# Patient Record
Sex: FEMALE | Race: WHITE | Employment: FULL TIME | URBAN - METROPOLITAN AREA
[De-identification: names, ages, dates, MRNs, and addresses within clinical notes are randomized per-mention and may not be internally consistent; named-entity substitution may affect disease eponyms.]

---

## 2017-01-01 DIAGNOSIS — I48.19 PERSISTENT ATRIAL FIBRILLATION (HCC): ICD-10-CM

## 2017-02-15 RX ORDER — ALPRAZOLAM 0.5 MG/1
TABLET ORAL
Qty: 30 TAB | Refills: 0 | OUTPATIENT
Start: 2017-02-15 | End: 2017-04-13 | Stop reason: SDUPTHER

## 2017-02-15 NOTE — TELEPHONE ENCOUNTER
Patient requesting refill. States she only usually takes the med 3-4 times a month but is under stress due to her  being in hospice. Pt is also back under cardio care due to going back into A. Fib.  Please call [phone] or [address] from 3-6pm

## 2017-04-04 ENCOUNTER — OFFICE VISIT (OUTPATIENT)
Dept: CARDIOLOGY CLINIC | Age: 77
End: 2017-04-04

## 2017-04-04 VITALS
HEIGHT: 65 IN | HEART RATE: 64 BPM | SYSTOLIC BLOOD PRESSURE: 122 MMHG | WEIGHT: 131.2 LBS | DIASTOLIC BLOOD PRESSURE: 80 MMHG | BODY MASS INDEX: 21.86 KG/M2

## 2017-04-04 DIAGNOSIS — I10 HYPERTENSION, ESSENTIAL, BENIGN: ICD-10-CM

## 2017-04-04 DIAGNOSIS — E78.2 MIXED HYPERLIPIDEMIA: Chronic | ICD-10-CM

## 2017-04-04 DIAGNOSIS — I48.19 PERSISTENT ATRIAL FIBRILLATION (HCC): Primary | ICD-10-CM

## 2017-04-04 DIAGNOSIS — I49.3 PVC (PREMATURE VENTRICULAR CONTRACTION): ICD-10-CM

## 2017-04-04 DIAGNOSIS — Z91.89 STROKE RISK: ICD-10-CM

## 2017-04-04 NOTE — PROGRESS NOTES
Loulou Humphreys     1940       Carson Isabel MD, [facility]  Date of Visit-2017   PCP is Zakiya Best MD   [facility] [place]  [facility]  HPI:  Loulou Humphreys is a 77 y.o. female   Persistent atrial fibrillation  Echo  with dilated RA but preserved LV fx and some mild moderate reugurg  Her  passed away after a long illness this past few months. She's had stress related diarrhea. Her  of 54 years  17. Her son Ha Lugo from [place], [place], is here with her today and very helpful to her. She notices some palpitations in the p.m. She has no chest pain, shortness of breath, dizziness or fainting. She seems overall at her baseline and blood pressure is well controlled. Assessment/Plan:       ICD-10-CM      1. Persistent atrial fibrillation, tolerating well, normal EF. Seems to be NYHA1. Continue Diltiazem with Toprol. Heart rate is good. 2. True stroke risk. Continue [address]. Has had no bleeding. 3. Hypertension, at goal.  4. PVCs, quiet at this time. 5. History of DVT, previous possible embolic ; the RA dilated, but that may also be due to the atrial fibrillation/HTN and aging . At this point no changes are needed as there are no CHF symptoms. 6. Mixed dyslipidemia, on statin. 7. Follow up in six months. Clinically doing well. Expressed our condolences over the loss of her  and understand she is doing very well with her son as good support. Cardiac History:   MAGALI 13=EF 55-60, mild MR  CV 13-sucessful with 150 J one shock  ECHO 13=mild dilated RV, 2+ ZOË, 2+ MR,TR, mild pulm HTN  ECHO 3-= EF 65% ZOË,mild MR,TR  CATH 2010= normal cors, ef 60%     ROS-except as noted above. . A complete cardiac and respiratory are reviewed and negative except as above ; Resp-denies wheezing  or productive cough,.  Const- No unusual weight loss or fever; Neuro-no recent seizure or CVA ; GI- No BRBPR, abdom pain, bloating ; - no  hematuria   see supplement sheet, initialed and to be scanned by staff  Past Medical History:   Diagnosis Date    Breast cancer ([facility] 3/18/2009    XRT;      [facility] 3/18/2009    Left Leg; ? 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 She has never used smokeless tobacco. She reports that she drinks about 1.0 oz of alcohol per week  She reports that she does not use illicit drugs. Exam and Labs:    Visit Vitals    /80 (BP 1 Location: Left arm, BP Patient Position: Sitting)    Pulse 64    Ht 5' 5\" (1.651 m)    Wt 131 lb 3.2 oz (59.5 kg)    BMI 21.83 kg/m2      Constitutional:  NAD, comfortable  Head: NC,AT. Eyes: No scleral icterus. Neck:  Neck supple. No JVD present. Throat: moist mucous membranes. Chest: Effort normal & normal respiratory excursion . Neurological: alert, conversant and oriented . Skin: Skin is not cold. No obvious systemic rash noted. Not diaphoretic. No erythema. Psychiatric:  Grossly normal mood and affect. Behavior appears normal. Extremities:  no clubbing or cyanosis. Abdomen: non distended    Lungs:breath sounds normal. No stridor. distress, wheezes or  Rales. Heart:    no murmurs noted, no gallops noted, irregularly irregular rhythm with rate ok, no JVD , PMI non displaced. Edema: Edema is none. Lab Results   Component Value Date/Time    Cholesterol, total 148 07/18/2016 10:20 AM    HDL Cholesterol 68 07/18/2016 10:20 AM    LDL, calculated 63 07/18/2016 10:20 AM    Triglyceride 83 07/18/2016 10:20 AM     No results found for this or any previous visit.    Lab Results   Component Value Date/Time    Sodium 143 07/18/2016 10:20 AM    Potassium 4.7 07/18/2016 10:20 AM    Chloride 102 07/18/2016 10:20 AM    CO2 24 07/18/2016 10:20 AM    Anion gap 9 08/24/2013 03:55 AM    Glucose 84 07/18/2016 10:20 AM    BUN 13 07/18/2016 10:20 AM    Creatinine 0.68 07/18/2016 10:20 AM    BUN/Creatinine ratio 19 07/18/2016 10:20 AM    GFR est  07/18/2016 10:20 AM    GFR est non-AA 86 07/18/2016 10:20 AM    Calcium 9.4 07/18/2016 10:20 AM      Wt Readings from Last 3 Encounters:   04/04/17 131 lb 3.2 oz (59.5 kg)   10/04/16 130 lb (59 kg)   09/06/16 136 lb 9.6 oz (62 kg)      BP Readings from Last 3 Encounters:   04/04/17 122/80   10/04/16 130/82   09/06/16 108/64        Current Outpatient Prescriptions   Medication Sig    ACETAMINOPHEN/DIPHENHYDRAMINE (TYLENOL PM PO) Take  by mouth as needed.  ALPRAZolam (XANAX) 0.5 mg tablet TAKE 1 TABLET BY MOUTH NIGHTLY AS NEEDED FOR ANXIETY.  ELIQUIS 2.5 mg tablet TAKE 1 TABLET BY MOUTH 2 TIMES A DAY.  atorvastatin (LIPITOR) 10 mg tablet TAKE 1/2 TABLET BY MOUTH EVERY DAY.  dilTIAZem CD (CARDIZEM CD) 240 mg ER capsule TAKE 1 CAPSULE BY MOUTH EVERY DAY.  cholecalciferol (VITAMIN D3) 400 unit tab tablet Take 800 Units by mouth daily.  triamcinolone acetonide (KENALOG) 0.1 % ointment Apply  to affected area two (2) times a day. use thin layer (Patient taking differently: Apply  to affected area as needed. use thin layer)    metoprolol succinate (TOPROL-XL) 50 mg XL tablet TAKE 1 TABLET BY MOUTH DAILY.  cetirizine (ZYRTEC) 10 mg tablet Take 10 mg by mouth as needed.  B COMPLEX WITH VITAMIN C (ALLBEE/C PO) Take 1 Tab by mouth daily.  naproxen sodium (ALEVE) 220 mg cap Take  by mouth daily as needed. No current facility-administered medications for this visit. Impression see above.

## 2017-04-04 NOTE — MR AVS SNAPSHOT
Visit Information Date & Time Provider Department Dept. Phone Encounter #  
 4/4/2017  9:20 AM Reynodl Haile MD [facility] [phone] [id number] Upcoming Health Maintenance Date Due Pneumococcal 65+ High/Highest Risk (2 of 2 - PPSV23) 10/18/2011 INFLUENZA AGE 9 TO ADULT 8/1/2016 GLAUCOMA SCREENING Q2Y 3/27/2017 MEDICARE YEARLY EXAM 7/19/2017 COLONOSCOPY 8/20/2019 DTaP/Tdap/Td series (2 - Td) 1/18/2022 Allergies as of 4/4/2017  Review Complete On: 10/4/2016 By: Reynold Haile MD  
  
 Severity Noted Reaction Type Reactions Ciprofloxacin  10/01/2006    Hives Current Immunizations  Reviewed on 1/5/2015 Name Date Influenza Vaccine 10/15/2015, 1/5/2015 Influenza Vaccine Split 10/20/2011 12:54 PM  
 Pneumococcal Vaccine (Unspecified Type) 10/18/2006 TDAP Vaccine 1/18/2012 Zoster 7/1/2007 Not reviewed this visit Vitals BP Pulse Height(growth percentile) Weight(growth percentile) BMI OB Status 122/80 (BP 1 Location: Left arm, BP Patient Position: Sitting) 64 5' 5\" (1.651 m) 131 lb 3.2 oz (59.5 kg) 21.83 kg/m2 Postmenopausal  
 Smoking Status Never Smoker Vitals History BMI and BSA Data Body Mass Index Body Surface Area  
 21.83 kg/m 2 1.65 m 2 Preferred Pharmacy Pharmacy Name Phone [address], [address] [phone] Your Updated Medication List  
  
   
This list is accurate as of: 4/4/17 10:45 AM.  Always use your most recent med list.  
  
  
  
  
 ALEVE 220 mg Cap Generic drug:  naproxen sodium Take  by mouth daily as needed. ALLBEE/C PO Take 1 Tab by mouth daily. ALPRAZolam 0.5 mg tablet Commonly known as:  XANAX  
TAKE 1 TABLET BY MOUTH NIGHTLY AS NEEDED FOR ANXIETY. atorvastatin 10 mg tablet Commonly known as:  LIPITOR  
TAKE 1/2 TABLET BY MOUTH EVERY DAY. cholecalciferol 400 unit Tab tablet Commonly known as:  VITAMIN D3 Take 800 Units by mouth daily. dilTIAZem  mg ER capsule Commonly known as:  CARDIZEM CD  
TAKE 1 CAPSULE BY MOUTH EVERY DAY. ELIQUIS 2.5 mg tablet Generic drug:  apixaban TAKE 1 TABLET BY MOUTH 2 TIMES A DAY. metoprolol succinate 50 mg XL tablet Commonly known as:  TOPROL-XL  
TAKE 1 TABLET BY MOUTH DAILY. triamcinolone acetonide 0.1 % ointment Commonly known as:  KENALOG Apply  to affected area two (2) times a day. use thin layer TYLENOL PM PO Take  by mouth as needed. ZyrTEC 10 mg tablet Generic drug:  cetirizine Take 10 mg by mouth as needed. Patient Instructions Follow up with Dr. Khushbu Tilley in 6 months. Introducing [facility]! Hernandez Lares introduces [employer] patient portal. Now you can access parts of your medical record, email your doctor's office, and request medication refills online. 1. In your internet browser, go to [email or web address] 2. Click on the First Time User? Click Here link in the Sign In box. You will see the New Member Sign Up page. 3. Enter your [employer] Access Code exactly as it appears below. You will not need to use this code after youve completed the sign-up process. If you do not sign up before the expiration date, you must request a new code. · [employer] Access Code: [email or web address] Expires: 7/3/2017 10:45 AM 
 
4. Enter the last four digits of your Social Security Number (xxxx) and Date of Birth (mm/dd/yyyy) as indicated and click Submit. You will be taken to the next sign-up page. 5. Create a Horizontal Systemst ID. This will be your [employer] login ID and cannot be changed, so think of one that is secure and easy to remember. 6. Create a Horizontal System[employer] password. You can change your password at any time. 7. Enter your Password Reset Question and Answer. This can be used at a later time if you forget your password. 8. Enter your e-mail address. You will receive e-mail notification when new information is available in [address] 9. Click Sign Up. You can now view and download portions of your medical record. 10. Click the Download Summary menu link to download a portable copy of your medical information. If you have questions, please visit the Frequently Asked Questions section of the TriggerMail website. Remember, Tr[employer] is NOT to be used for urgent needs. For medical emergencies, dial 911. Now available from your iPhone and Android! Please provide this summary of care documentation to your next provider. Your primary care clinician is listed as [address]ie [address]. If you have any questions after today's visit, please call [phone].

## 2017-04-13 ENCOUNTER — OFFICE VISIT (OUTPATIENT)
Dept: INTERNAL MEDICINE CLINIC | Age: 77
End: 2017-04-13

## 2017-04-13 ENCOUNTER — HOSPITAL ENCOUNTER (OUTPATIENT)
Dept: LAB | Age: 77
Discharge: HOME | End: 2017-04-13
Payer: MEDICARE

## 2017-04-13 VITALS
DIASTOLIC BLOOD PRESSURE: 86 MMHG | WEIGHT: 131 LBS | RESPIRATION RATE: 16 BRPM | BODY MASS INDEX: 21.83 KG/M2 | SYSTOLIC BLOOD PRESSURE: 152 MMHG | TEMPERATURE: 97.6 F | OXYGEN SATURATION: 98 % | HEIGHT: 65 IN | HEART RATE: 103 BPM

## 2017-04-13 DIAGNOSIS — R19.7 DIARRHEA, UNSPECIFIED TYPE: ICD-10-CM

## 2017-04-13 DIAGNOSIS — M79.89 LEG SWELLING: ICD-10-CM

## 2017-04-13 DIAGNOSIS — G47.9 SLEEP DISORDER: ICD-10-CM

## 2017-04-13 DIAGNOSIS — I48.19 PERSISTENT ATRIAL FIBRILLATION (HCC): Primary | ICD-10-CM

## 2017-04-13 DIAGNOSIS — I10 HYPERTENSION, ESSENTIAL, BENIGN: ICD-10-CM

## 2017-04-13 PROCEDURE — 80053 COMPREHEN METABOLIC PANEL: CPT

## 2017-04-13 PROCEDURE — 84443 ASSAY THYROID STIM HORMONE: CPT

## 2017-04-13 PROCEDURE — 85027 COMPLETE CBC AUTOMATED: CPT

## 2017-04-13 PROCEDURE — 36415 COLL VENOUS BLD VENIPUNCTURE: CPT

## 2017-04-13 RX ORDER — ALPRAZOLAM 0.5 MG/1
TABLET ORAL
Qty: 30 TAB | Refills: 0 | Status: SHIPPED | OUTPATIENT
Start: 2017-04-13 | End: 2018-06-04

## 2017-04-13 NOTE — PROGRESS NOTES
HISTORY OF PRESENT ILLNESS  Loulou Humphreys is a 77 y.o. female. HPI     Reports she is still in a-fib and can feel this. Pt is followed by Dr. Neville Tarango.     Hypertension ROS: taking medications as instructed, no medication side effects noted, no TIA's, no chest pain on exertion, no dyspnea on exertion, no swelling of ankles. New concerns: Stable- bp was 152/86 in the office today. Pt complains of swelling in bilateral legs and feet that have been present for the past week. Pt saw Dr. Neville Tarango on 4/4/17. Pt watches her salt intake. Pt saw podiatrist who told her that her feet were hot and swollen. Pt went home and elevated her feet and used ice packs. Pt states that swelling is worse at night and better in the morning. She has had intermittent diarrhea for the past month that she thinks is stress driven. Her  passed away 4 weeks ago. Pt states that she has been wearing a depend due to diarrhea. Pt has occasionally had diarrhea in the past with stress. Pt states that initially she was having diarrhea every time she ate. She started to eat a more bland diet and has since been adding other foods. Pt ate a salad last night for dinner and had diarrhea. Reports diarrhea has improved in the past two days. Pt denies abdominal pain and bloody or darker colored stools. Reports she had some of her colon removed in the past because of cancer and had diarrhea for two months- she picked up infection while in hospital that was causing her diarrhea. Her  passed away 4 weeks ago. Reports her  had dementia and it was very tough. They were  54 years and she is missing him. She has a daughter and a son that both live out [facility]. Pt has considered moving out [facility] in a few years when everything calms down. Pt does after school in [address] [place] [address] and the kids keep her busy. Pt states that she has been waking up every night at 2 am and this has been going on for awhile.  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 She has been taking Xanax 0.5 mg 1/2 tablet that helps her sleep. She tries not to take Xanax daily. Reports when she does not take this medication she is up from 2 am to 6 am.       Review of Systems   Cardiovascular: Positive for leg swelling. Gastrointestinal: Positive for diarrhea. All other systems reviewed and are negative. Physical Exam   Constitutional: She is oriented to person, place, and time. She appears well-developed and well-nourished. HENT:   Head: Normocephalic and atraumatic. Right Ear: External ear normal.   Left Ear: External ear normal.   Nose: Nose normal.   Mouth/Throat: Oropharynx is clear and moist.   Eyes: Conjunctivae and EOM are normal.   Neck: Normal range of motion. Neck supple. Carotid bruit is not present. No thyroid mass and no thyromegaly present. Cardiovascular: S1 normal, S2 normal, normal heart sounds and intact distal pulses. A-fib- chronic   Pulmonary/Chest: Effort normal and breath sounds normal.   Abdominal: Soft. Normal appearance and bowel sounds are normal. There is no hepatosplenomegaly. There is no tenderness. Musculoskeletal: Normal range of motion. Neurological: She is alert and oriented to person, place, and time. She has normal strength. No cranial nerve deficit or sensory deficit. Coordination normal.   Skin: Skin is warm, dry and intact. No abrasion and no rash noted. Psychiatric: She has a normal mood and affect. Her behavior is normal. Judgment and thought content normal.   Nursing note and vitals reviewed. ASSESSMENT and PLAN  Aleks Costa was seen today for swelling and sleep problem. Diagnoses and all orders for this visit:    Persistent atrial fibrillation (HCC)  Stable- Continue current medications.   -     CBC W/O DIFF    Hypertension, essential, benign  BP is at goal. I do not recommend any change in medications.  -     METABOLIC PANEL, COMPREHENSIVE    Leg swelling  Leg swelling has been present for the past week and this correlates to the temperature change. Advised pt that veins have a hard time adjusting to temperature change. Her swelling is better in the morning and worse in evening. She saw Dr. Chiquita Ordonez a week ago who thought her heart was fine and she had echo in 10/2016. Pt should wear compression stockings. I am avoiding diuretic as she has diarrhea. Diarrhea, unspecified type  Diarrhea has improved in past two days and we will monitor this and she should let me know if it does not improve.  -     TSH 3RD GENERATION    Sleep disorder  I told pt that I do not feel comfortable with her taking Ambien and she should continue to take Xanax 1/2 tablet as she has been doing this. Other orders  -     ALPRAZolam (XANAX) 0.5 mg tablet; TAKE 1 TABLET BY MOUTH NIGHTLY AS NEEDED FOR ANXIETY. Pt should follow up with Dr. Whitney Waters in one month.    lab results and schedule of future lab studies reviewed with patient  reviewed diet, exercise and weight control  reviewed medications and side effects in detail     Written by Ayleen Goncalves, as dictated by Prema Acuna MD.     Current diagnosis and concerns discussed with pt at length. Understands risks and benefits or current treatment plan and medications and accepts the treatment and medication with any possible risks. Pt asks appropriate questions which were answered. Pt instructed to call with any concerns or problems.

## 2017-04-14 LAB
ALBUMIN SERPL-MCNC: 4.5 G/DL (ref 3.5–4.8)
ALBUMIN/GLOB SERPL: 2 {RATIO} (ref 1.2–2.2)
ALP SERPL-CCNC: 91 IU/L (ref 39–117)
ALT SERPL-CCNC: 31 IU/L (ref 0–32)
AST SERPL-CCNC: 31 IU/L (ref 0–40)
BILIRUB SERPL-MCNC: 0.6 MG/DL (ref 0–1.2)
BUN SERPL-MCNC: 11 MG/DL (ref 8–27)
BUN/CREAT SERPL: 17 (ref 12–28)
CALCIUM SERPL-MCNC: 9.3 MG/DL (ref 8.7–10.3)
CHLORIDE SERPL-SCNC: 102 MMOL/L (ref 96–106)
CO2 SERPL-SCNC: 24 MMOL/L (ref 18–29)
CREAT SERPL-MCNC: 0.64 MG/DL (ref 0.57–1)
ERYTHROCYTE [DISTWIDTH] IN BLOOD BY AUTOMATED COUNT: 14.3 % (ref 12.3–15.4)
GLOBULIN SER CALC-MCNC: 2.3 G/DL (ref 1.5–4.5)
GLUCOSE SERPL-MCNC: 79 MG/DL (ref 65–99)
HCT VFR BLD AUTO: 39 % (ref 34–46.6)
HGB BLD-MCNC: 12.5 G/DL (ref 11.1–15.9)
MCH RBC QN AUTO: 30.1 PG (ref 26.6–33)
MCHC RBC AUTO-ENTMCNC: 32.1 G/DL (ref 31.5–35.7)
MCV RBC AUTO: 94 FL (ref 79–97)
PLATELET # BLD AUTO: 218 X10E3/UL (ref 150–379)
POTASSIUM SERPL-SCNC: 4.1 MMOL/L (ref 3.5–5.2)
PROT SERPL-MCNC: 6.8 G/DL (ref 6–8.5)
RBC # BLD AUTO: 4.15 X10E6/UL (ref 3.77–5.28)
SODIUM SERPL-SCNC: 143 MMOL/L (ref 134–144)
TSH SERPL DL<=0.005 MIU/L-ACNC: 2.67 UIU/ML (ref 0.45–4.5)
WBC # BLD AUTO: 7.5 X10E3/UL (ref 3.4–10.8)

## 2017-05-15 ENCOUNTER — OFFICE VISIT (OUTPATIENT)
Dept: INTERNAL MEDICINE CLINIC | Age: 77
End: 2017-05-15

## 2017-05-15 VITALS
TEMPERATURE: 97.4 F | OXYGEN SATURATION: 98 % | SYSTOLIC BLOOD PRESSURE: 143 MMHG | HEART RATE: 96 BPM | WEIGHT: 125.4 LBS | HEIGHT: 65 IN | DIASTOLIC BLOOD PRESSURE: 73 MMHG | BODY MASS INDEX: 20.89 KG/M2 | RESPIRATION RATE: 18 BRPM

## 2017-05-15 DIAGNOSIS — F43.9 STRESS AT HOME: ICD-10-CM

## 2017-05-15 DIAGNOSIS — R53.82 CHRONIC FATIGUE: ICD-10-CM

## 2017-05-15 DIAGNOSIS — I48.19 PERSISTENT ATRIAL FIBRILLATION (HCC): ICD-10-CM

## 2017-05-15 DIAGNOSIS — L30.9 ECZEMA, UNSPECIFIED TYPE: ICD-10-CM

## 2017-05-15 DIAGNOSIS — K59.1 FUNCTIONAL DIARRHEA: Primary | ICD-10-CM

## 2017-05-15 RX ORDER — MONTELUKAST SODIUM 4 MG/1
1 TABLET, CHEWABLE ORAL 2 TIMES DAILY
Qty: 60 TAB | Refills: 3 | Status: SHIPPED | OUTPATIENT
Start: 2017-05-15 | End: 2018-06-04

## 2017-05-15 RX ORDER — CLOBETASOL PROPIONATE 0.5 MG/G
CREAM TOPICAL 2 TIMES DAILY
Qty: 15 G | Refills: 0 | Status: SHIPPED | OUTPATIENT
Start: 2017-05-15 | End: 2018-06-04

## 2017-05-15 NOTE — MR AVS SNAPSHOT
Visit Information Date & Time Provider Department Dept. Phone Encounter #  
 5/15/2017 10:15 AM Carlos Escobedo MD [facility] [address] [id number] Your Appointments 10/3/2017 10:20 AM  
ESTABLISHED PATIENT with Luisa Dorado MD  
[facility] ([facility] SCHEDULING) Appt Note: 6 month follow up  
 [address]  
[address] [address] Upcoming Health Maintenance Date Due Pneumococcal 65+ High/Highest Risk (2 of 2 - PPSV23) 10/18/2011 GLAUCOMA SCREENING Q2Y 3/27/2017 MEDICARE YEARLY EXAM 7/19/2017 INFLUENZA AGE 9 TO ADULT 8/1/2017 COLONOSCOPY 8/20/2019 DTaP/Tdap/Td series (2 - Td) 1/18/2022 Allergies as of 5/15/2017  Review Complete On: 5/15/2017 By: Anupama Mccartney LPN Severity Noted Reaction Type Reactions Ciprofloxacin  10/01/2006    Hives Current Immunizations  Reviewed on 1/5/2015 Name Date Influenza Vaccine 10/15/2015, 1/5/2015 Influenza Vaccine Split 10/20/2011 12:54 PM  
 Pneumococcal Vaccine (Unspecified Type) 10/18/2006 TDAP Vaccine 1/18/2012 Zoster 7/1/2007 Not reviewed this visit You Were Diagnosed With   
  
 Codes Comments Functional diarrhea    -  Primary ICD-10-CM: K59.1 ICD-9-CM: 564.5 Eczema, unspecified type     ICD-10-CM: L30.9 ICD-9-CM: 692.9 Chronic fatigue     ICD-10-CM: R53.82 
ICD-9-CM: 780.79 Stress at home     ICD-10-CM: F43.9 ICD-9-CM: V61.9 Persistent atrial fibrillation (HCC)     ICD-10-CM: I48.1 ICD-9-CM: 427.31 Vitals BP Pulse Temp Resp Height(growth percentile) Weight(growth percentile) 143/73 (BP 1 Location: Left arm, BP Patient Position: Sitting) 96 97.4 °F (36.3 °C) (Oral) 18 5' 5\" (1.651 m) 125 lb 6.4 oz (56.9 kg) SpO2 BMI OB Status Smoking Status 98% 20.87 kg/m2 Postmenopausal Never Smoker Vitals History BMI and BSA Data Body Mass Index Body Surface Area  
 20.87 kg/m 2 1.62 m 2 Preferred Pharmacy Pharmacy Name Phone [address], [address] [facility] [phone] Your Updated Medication List  
  
   
This list is accurate as of: 5/15/17 11:05 AM.  Always use your most recent med list.  
  
  
  
  
 ALEVE 220 mg Cap Generic drug:  naproxen sodium Take  by mouth daily as needed. ALLBEE/C PO Take 1 Tab by mouth daily. ALPRAZolam 0.5 mg tablet Commonly known as:  XANAX  
TAKE 1 TABLET BY MOUTH NIGHTLY AS NEEDED FOR ANXIETY. atorvastatin 10 mg tablet Commonly known as:  LIPITOR  
TAKE 1/2 TABLET BY MOUTH EVERY DAY. cholecalciferol 400 unit Tab tablet Commonly known as:  VITAMIN D3 Take 800 Units by mouth daily. clobetasol 0.05 % topical cream  
Commonly known as:  Celia Rape Apply  to affected area two (2) times a day. colestipol 1 gram tablet Commonly known as:  COLESTID Take 1 Tab by mouth two (2) times a day. dilTIAZem  mg ER capsule Commonly known as:  CARDIZEM CD  
TAKE 1 CAPSULE BY MOUTH EVERY DAY. ELIQUIS 2.5 mg tablet Generic drug:  apixaban TAKE 1 TABLET BY MOUTH 2 TIMES A DAY. metoprolol succinate 50 mg XL tablet Commonly known as:  TOPROL-XL  
TAKE 1 TABLET BY MOUTH DAILY. triamcinolone acetonide 0.1 % ointment Commonly known as:  KENALOG Apply  to affected area two (2) times a day. use thin layer TYLENOL PM PO Take  by mouth as needed. ZyrTEC 10 mg tablet Generic drug:  cetirizine Take 10 mg by mouth as needed. Prescriptions Sent to Pharmacy Refills  
 clobetasol (TEMOVATE) 0.05 % topical cream 0 Sig: Apply  to affected area two (2) times a day. Class: Normal  
 Pharmacy: [facility] [address], [address] [facility] Ph #: [phone]  Route: Topical  
 colestipol (COLESTID) 1 gram tablet 3 Sig: Take 1 Tab by mouth two (2) times a day. Class: Normal  
 Pharmacy: [facility] [address] [facility] #: [phone] Route: Oral  
  
We Performed the Following C DIFFICILE TOXIN A & B BY EIA [07864 CPT(R)] CULTURE, STOOL [id number] CPT(R)] OVA & PARASITES, STOOL [id number] CPT(R)] WBC, STOOL [23193 CPT(R)] Introducing [facility]! Huong Pooja introduces [employer] patient portal. Now you can access parts of your medical record, email your doctor's office, and request medication refills online. 1. In your internet browser, go to [email or web address] 2. Click on the First Time User? Click Here link in the Sign In box. You will see the New Member Sign Up page. 3. Enter your Zkatter Access Code exactly as it appears below. You will not need to use this code after youve completed the sign-up process. If you do not sign up before the expiration date, you must request a new code. · [employer] Access Code: [email or web address] Expires: 7/3/2017 10:45 AM 
 
4. Enter the last four digits of your Social Security Number (xxxx) and Date of Birth (mm/dd/yyyy) as indicated and click Submit. You will be taken to the next sign-up page. 5. Create a Zkatter ID. This will be your Zkatter login ID and cannot be changed, so think of one that is secure and easy to remember. 6. Create a Zkatter password. You can change your password at any time. 7. Enter your Password Reset Question and Answer. This can be used at a later time if you forget your password. 8. Enter your e-mail address. You will receive e-mail notification when new information is available in [address] 9. Click Sign Up. You can now view and download portions of your medical record. 10. Click the Download Summary menu link to download a portable copy of your medical information. If you have questions, please visit the Frequently Asked Questions section of the K[employer] website. Remember, EveryScape is NOT to be used for urgent needs. For medical emergencies, dial 911. Now available from your iPhone and Android! Please provide this summary of care documentation to your next provider. Your primary care clinician is listed as Bal Martin. If you have any questions after today's visit, please call [phone].

## 2017-05-16 NOTE — PROGRESS NOTES
HISTORY OF PRESENT ILLNESS  Jeff Humphreys is a 77 y.o. female. HPI  She has had diarrhea for 2 mo and thinks it is from stress with loss of . Did have a rectal polyp in 2009-no recent colonoscopy. No blood in stool or weight loss. Cbc ad cmp normal 1 mo ago. Newest med is eliquis. Using xanax about 2 per week and declines other meds for stress. No uti sxs. Review of Systems   Constitutional: Positive for malaise/fatigue. Negative for fever and weight loss. Respiratory: Negative for shortness of breath. Cardiovascular: Negative for chest pain. Gastrointestinal: Positive for diarrhea. Negative for abdominal pain, blood in stool, melena, nausea and vomiting. Genitourinary: Negative for dysuria. Psychiatric/Behavioral: Positive for depression. The patient has insomnia. Physical Exam   Constitutional: She is oriented to person, place, and time. She appears well-developed and well-nourished. HENT:   Head: Normocephalic and atraumatic. Neck: Normal range of motion. Neck supple. Carotid bruit is not present. No thyromegaly present. Cardiovascular: Normal rate, regular rhythm, S1 normal, S2 normal, normal heart sounds and intact distal pulses. No murmur heard. Pulmonary/Chest: Effort normal and breath sounds normal. No respiratory distress. She has no wheezes. She has no rales. Abdominal: Soft. She exhibits no distension. There is no tenderness. There is no rebound. Musculoskeletal: She exhibits no edema. Neurological: She is alert and oriented to person, place, and time. Skin: No rash noted. Psychiatric: She has a normal mood and affect. Her behavior is normal.   Nursing note and vitals reviewed. ASSESSMENT and PLAN  Lauren Mulligan was seen today for gi problem and diarrhea. Diagnoses and all orders for this visit:    Functional diarrhea  -     colestipol (COLESTID) 1 gram tablet; Take 1 Tab by mouth two (2) times a day.   -     OVA & PARASITES, STOOL  -     CULTURE, STOOL  - C DIFFICILE TOXIN A & B BY EIA  -     WBC, STOOL    Eczema, unspecified type  -     clobetasol (TEMOVATE) 0.05 % topical cream; Apply  to affected area two (2) times a day.     Chronic fatigue    Stress at home    Persistent atrial fibrillation [facility])    refer to gi for colonoscopy if studies normal and no help with colestid

## 2017-05-18 DIAGNOSIS — K59.1 FUNCTIONAL DIARRHEA: Primary | ICD-10-CM

## 2017-05-22 ENCOUNTER — HOSPITAL ENCOUNTER (EMERGENCY)
Age: 77
Discharge: HOME | End: 2017-05-23
Attending: EMERGENCY MEDICINE | Admitting: EMERGENCY MEDICINE
Payer: MEDICARE

## 2017-05-22 DIAGNOSIS — N93.8 DUB (DYSFUNCTIONAL UTERINE BLEEDING): Primary | ICD-10-CM

## 2017-05-22 LAB
ALBUMIN SERPL BCP-MCNC: 4.2 G/DL (ref 3.5–5)
ALBUMIN/GLOB SERPL: 1.1 {RATIO} (ref 1.1–2.2)
ALP SERPL-CCNC: 97 U/L (ref 45–117)
ALT SERPL-CCNC: 29 U/L (ref 12–78)
ANION GAP BLD CALC-SCNC: 8 MMOL/L (ref 5–15)
AST SERPL W P-5'-P-CCNC: 20 U/L (ref 15–37)
BASOPHILS # BLD AUTO: 0 K/UL (ref 0–0.1)
BASOPHILS # BLD: 0 % (ref 0–1)
BILIRUB SERPL-MCNC: 0.3 MG/DL (ref 0.2–1)
BUN SERPL-MCNC: 17 MG/DL (ref 6–20)
BUN/CREAT SERPL: 15 (ref 12–20)
CALCIUM SERPL-MCNC: 9.5 MG/DL (ref 8.5–10.1)
CHLORIDE SERPL-SCNC: 105 MMOL/L (ref 97–108)
CO2 SERPL-SCNC: 26 MMOL/L (ref 21–32)
CREAT SERPL-MCNC: 1.11 MG/DL (ref 0.55–1.02)
EOSINOPHIL # BLD: 0.1 K/UL (ref 0–0.4)
EOSINOPHIL NFR BLD: 1 % (ref 0–7)
ERYTHROCYTE [DISTWIDTH] IN BLOOD BY AUTOMATED COUNT: 13.2 % (ref 11.5–14.5)
GLOBULIN SER CALC-MCNC: 3.8 G/DL (ref 2–4)
GLUCOSE SERPL-MCNC: 88 MG/DL (ref 65–100)
HCT VFR BLD AUTO: 40.5 % (ref 35–47)
HEMOCCULT STL QL: POSITIVE
HGB BLD-MCNC: 13.2 G/DL (ref 11.5–16)
LYMPHOCYTES # BLD AUTO: 29 % (ref 12–49)
LYMPHOCYTES # BLD: 3.2 K/UL (ref 0.8–3.5)
MCH RBC QN AUTO: 30.1 PG (ref 26–34)
MCHC RBC AUTO-ENTMCNC: 32.6 G/DL (ref 30–36.5)
MCV RBC AUTO: 92.5 FL (ref 80–99)
MONOCYTES # BLD: 1.2 K/UL (ref 0–1)
MONOCYTES NFR BLD AUTO: 11 % (ref 5–13)
NEUTS SEG # BLD: 6.5 K/UL (ref 1.8–8)
NEUTS SEG NFR BLD AUTO: 59 % (ref 32–75)
PLATELET # BLD AUTO: 238 K/UL (ref 150–400)
POTASSIUM SERPL-SCNC: 3.6 MMOL/L (ref 3.5–5.1)
PROT SERPL-MCNC: 8 G/DL (ref 6.4–8.2)
RBC # BLD AUTO: 4.38 M/UL (ref 3.8–5.2)
SODIUM SERPL-SCNC: 139 MMOL/L (ref 136–145)
WBC # BLD AUTO: 11 K/UL (ref 3.6–11)

## 2017-05-22 PROCEDURE — 99284 EMERGENCY DEPT VISIT MOD MDM: CPT

## 2017-05-22 PROCEDURE — 81001 URINALYSIS AUTO W/SCOPE: CPT | Performed by: EMERGENCY MEDICINE

## 2017-05-22 PROCEDURE — 36415 COLL VENOUS BLD VENIPUNCTURE: CPT | Performed by: EMERGENCY MEDICINE

## 2017-05-22 PROCEDURE — 80053 COMPREHEN METABOLIC PANEL: CPT | Performed by: EMERGENCY MEDICINE

## 2017-05-22 PROCEDURE — 51701 INSERT BLADDER CATHETER: CPT

## 2017-05-22 PROCEDURE — 85025 COMPLETE CBC W/AUTO DIFF WBC: CPT | Performed by: EMERGENCY MEDICINE

## 2017-05-22 PROCEDURE — 82272 OCCULT BLD FECES 1-3 TESTS: CPT | Performed by: EMERGENCY MEDICINE

## 2017-05-23 ENCOUNTER — HOSPITAL ENCOUNTER (OUTPATIENT)
Dept: CT IMAGING | Age: 77
Discharge: HOME | End: 2017-05-23
Attending: INTERNAL MEDICINE
Payer: MEDICARE

## 2017-05-23 ENCOUNTER — HOSPITAL ENCOUNTER (OUTPATIENT)
Dept: LAB | Age: 77
Discharge: HOME | End: 2017-05-23
Payer: MEDICARE

## 2017-05-23 ENCOUNTER — OFFICE VISIT (OUTPATIENT)
Dept: INTERNAL MEDICINE CLINIC | Age: 77
End: 2017-05-23

## 2017-05-23 VITALS
HEART RATE: 88 BPM | BODY MASS INDEX: 21.33 KG/M2 | HEIGHT: 65 IN | SYSTOLIC BLOOD PRESSURE: 119 MMHG | RESPIRATION RATE: 16 BRPM | OXYGEN SATURATION: 98 % | DIASTOLIC BLOOD PRESSURE: 69 MMHG | TEMPERATURE: 97.7 F | WEIGHT: 128 LBS

## 2017-05-23 VITALS
HEART RATE: 70 BPM | SYSTOLIC BLOOD PRESSURE: 131 MMHG | TEMPERATURE: 98.5 F | RESPIRATION RATE: 18 BRPM | OXYGEN SATURATION: 98 % | DIASTOLIC BLOOD PRESSURE: 85 MMHG

## 2017-05-23 DIAGNOSIS — N95.0 POST-MENOPAUSAL BLEEDING: Primary | ICD-10-CM

## 2017-05-23 DIAGNOSIS — N76.0 ACUTE VAGINITIS: ICD-10-CM

## 2017-05-23 DIAGNOSIS — C18.9 MALIGNANT NEOPLASM OF COLON, UNSPECIFIED PART OF COLON (HCC): ICD-10-CM

## 2017-05-23 DIAGNOSIS — K92.1 BLOOD IN STOOL: ICD-10-CM

## 2017-05-23 LAB
APPEARANCE UR: CLEAR
ARTERIAL PATENCY WRIST A: YES
BACTERIA URNS QL MICRO: NEGATIVE /HPF
BASE DEFICIT BLD-SCNC: 2 MMOL/L
BDY SITE: NORMAL
BILIRUB UR QL: NEGATIVE
C DIFF TOX A+B STL QL IA: NEGATIVE
CAMPYLOBACTER STL CULT: NORMAL
COLOR UR: NORMAL
E COLI SXT STL QL IA: NORMAL
EPITH CASTS URNS QL MICRO: NORMAL /LPF
GAS FLOW.O2 O2 DELIVERY SYS: NORMAL L/MIN
GLUCOSE UR STRIP.AUTO-MCNC: NEGATIVE MG/DL
HCO3 BLD-SCNC: 23.1 MMOL/L (ref 22–26)
HGB UR QL STRIP: NEGATIVE
HYALINE CASTS URNS QL MICRO: NORMAL /LPF (ref 0–5)
KETONES UR QL STRIP.AUTO: NEGATIVE MG/DL
LEUKOCYTE ESTERASE UR QL STRIP.AUTO: NEGATIVE
NITRITE UR QL STRIP.AUTO: NEGATIVE
O+P SPEC MICRO: NORMAL
PCO2 BLD: 36.8 MMHG (ref 35–45)
PH BLD: 7.41 [PH] (ref 7.35–7.45)
PH UR STRIP: 5 [PH] (ref 5–8)
PO2 BLD: 88 MMHG (ref 80–100)
PROT UR STRIP-MCNC: NEGATIVE MG/DL
RBC #/AREA URNS HPF: NORMAL /HPF (ref 0–5)
SALM + SHIG STL CULT: NORMAL
SAO2 % BLD: 97 % (ref 92–97)
SP GR UR REFRACTOMETRY: 1.02 (ref 1–1.03)
SPECIMEN TYPE: NORMAL
UROBILINOGEN UR QL STRIP.AUTO: 0.2 EU/DL (ref 0.2–1)
WBC STL QL MICRO: NORMAL
WBC URNS QL MICRO: NORMAL /HPF (ref 0–4)

## 2017-05-23 PROCEDURE — 74177 CT ABD & PELVIS W/CONTRAST: CPT

## 2017-05-23 PROCEDURE — 82803 BLOOD GASES ANY COMBINATION: CPT

## 2017-05-23 PROCEDURE — 74011636320 HC RX REV CODE- 636/320: Performed by: RADIOLOGY

## 2017-05-23 PROCEDURE — 36600 WITHDRAWAL OF ARTERIAL BLOOD: CPT

## 2017-05-23 RX ADMIN — IOPAMIDOL 100 ML: 755 INJECTION, SOLUTION INTRAVENOUS at 16:21

## 2017-05-23 NOTE — PROGRESS NOTES
Patient was seen at [facility] ER. She states that she's passed blood clots and dark blood. Says it was vaginal. Denies pain.

## 2017-05-23 NOTE — PROGRESS NOTES
HISTORY OF PRESENT ILLNESS  Lizette Humphreys is a 77 y.o. female. [facility] was having significant diarrhea. Her stool cultures were negative. The diarrhea has resolved, however she yesterday had an episode of vaginal bleeding spontaneous. No fevers or chills, no itch. Minimal abdominal cramping. In the ER her stool was found to be occult positive. No cultures were taken. She denies fevers or chills. She denies recent sexual activity. 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 Last colonoscopy that I have was 2012. She is on Eliquis. She's not been aware of blood in her stool. Her HGB and chemistries were stable in the ER. Review of Systems   Constitutional: Negative for chills, fever and weight loss. Episode of vaginal bleeding   Gastrointestinal: Negative for abdominal pain, blood in stool, diarrhea, nausea and vomiting. Genitourinary: Negative for dysuria, frequency, hematuria and urgency. Physical Exam   Constitutional: She is oriented to person, place, and time. She appears well-developed and well-nourished. HENT:   Head: Normocephalic and atraumatic. Neck: Normal range of motion. Neck supple. Carotid bruit is not present. No thyromegaly present. Cardiovascular: Normal rate, S1 normal, S2 normal, normal heart sounds and intact distal pulses. An irregularly irregular rhythm present. No murmur heard. Pulmonary/Chest: Effort normal and breath sounds normal. No respiratory distress. She has no wheezes. She has no rales. Abdominal: Soft. Bowel sounds are normal. She exhibits no distension. There is no tenderness. Genitourinary:   Genitourinary Comments: No external vaginal lesions seen  Vault well visualized and small amount of dark discharge seen in vault swab taken  No obvious bleeding around rectum   Musculoskeletal: She exhibits no edema. Neurological: She is alert and oriented to person, place, and time. Psychiatric: She has a normal mood and affect. Her behavior is normal.   Nursing note and vitals reviewed. ASSESSMENT and PLAN  Jessie Burdick was seen today for hospital follow up. Diagnoses and all orders for this visit:    Post-menopausal bleeding  -     REFERRAL TO GYNECOLOGY  Vaginal swab sent for infection  Blood in stool-on occult testing?  Contaminate but given prior rectal adenoca will send to gi and get ct to be sure no obvious metastatic disease  Cont eliquis for now as benefit for cva prevention strong but if gross bleeding ( not occult) may need to consider stopping the eliquis    Malignant neoplasm of colon, unspecified part of colon ([facility] [address]  -     REFERRAL TO GASTROENTEROLOGY  -     CT ABD PELV WO CONT; Future

## 2017-05-23 NOTE — ED TRIAGE NOTES
TRIAGE NOTE: Patient arrived via EMS from home with c/o \"fluid leaking from the vagina. \" Patient was at a therapy and when she went to stand up she had brown drainage coming down her leg. Patient has a history of chronic diarrhea. Patient is alert and oriented.

## 2017-05-23 NOTE — DISCHARGE INSTRUCTIONS
Abnormal Uterine Bleeding: Care Instructions  Your Care Instructions  Abnormal uterine bleeding (AUB) is irregular bleeding from the uterus that is longer or heavier than usual or does not occur at your regular time. Sometimes it is caused by changes in hormone levels. It can also be caused by growths in the uterus, such as fibroids or polyps. Sometimes a cause cannot be found. You may have heavy bleeding when you are not expecting your period. Your doctor may suggest a pregnancy test, if you think you are pregnant. Follow-up care is a key part of your treatment and safety. Be sure to make and go to all appointments, and call your doctor if you are having problems. It's also a good idea to know your test results and keep a list of the medicines you take. How can you care for yourself at home? · Be safe with medicines. Take pain medicines exactly as directed. ¨ If the doctor gave you a prescription medicine for pain, take it as prescribed. ¨ If you are not taking a prescription pain medicine, ask your doctor if you can take an over-the-counter medicine. · You may be low in iron because of blood loss. Eat a balanced diet that is high in iron and vitamin C. Foods rich in iron include red meat, shellfish, eggs, beans, and leafy green vegetables. Talk to your doctor about whether you need to take iron pills or a multivitamin. When should you call for help? Call 911 anytime you think you may need emergency care. For example, call if:  · You passed out (lost consciousness). Call your doctor now or seek immediate medical care if:  · You have sudden, severe pain in your belly or pelvis. · You have severe vaginal bleeding. You are soaking through your usual pads or tampons every hour for 2 or more hours. · You feel dizzy or lightheaded, or you feel like you may faint. Watch closely for changes in your health, and be sure to contact your doctor if:  · You have new belly or pelvic pain.   · You have a fever.  · Your bleeding gets worse or lasts longer than 1 week. · You think you may be pregnant. Where can you learn more? Go to http://alfred-lester.info/. Enter M096 in the search box to learn more about \"Abnormal Uterine Bleeding: Care Instructions. \"  Current as of: October 13, 2016  Content Version: 11.2  © 2065-6077 [employer]. Care instructions adapted under license by [employer] (which disclaims liability or warranty for this information). If you have questions about a medical condition or this instruction, always ask your healthcare professional. [place] [address] any warranty or liability for your use of this information. We hope that we have addressed all of your medical concerns. The examination and treatment you received in the Emergency Department were for an emergent problem and were not intended as complete care. It is important that you follow up with your healthcare provider(s) for ongoing care. If your symptoms worsen or do not improve as expected, and you are unable to reach your usual health care provider(s), you should return to the Emergency Department. Today's healthcare is undergoing tremendous change, and patient satisfaction surveys are one of the many tools to assess the quality of medical care. You may receive a survey from the [employer] regarding your experience in the Emergency Department. I hope that your experience has been completely positive, particularly the medical care that I provided. As such, please participate in the survey; anything less than excellent does not meet my expectations or intentions. [address] [facility] and [address] [place] [address] participate in nationally recognized quality of care measures.   If your blood pressure is greater than 120/80, as reported below, we urge that you seek medical care to address the potential of high blood pressure, commonly known as hypertension. Hypertension can be hereditary or can be caused by certain medical conditions, pain, stress, or \"white coat syndrome. \"       Please make an appointment with your health care provider(s) for follow up of your Emergency Department visit. VITALS:   Patient Vitals for the past 8 hrs:   Temp Pulse Resp BP SpO2   05/23/17 0011 98.5 °F (36.9 °C) 70 18 131/85 98 %   05/22/17 2123 97.9 °F (36.6 °C) 85 16 145/76 97 %          Thank you for allowing us to provide you with medical care today. We realize that you have many choices for your emergency care needs. Please choose us in the future for any continued health care needs. Paulinopierre Mayo, [address] [place] [address].   Office: [phone]            Recent Results (from the past 24 hour(s))   CBC WITH AUTOMATED DIFF    Collection Time: 05/22/17  9:31 PM   Result Value Ref Range    WBC 11.0 3.6 - 11.0 K/uL    RBC 4.38 3.80 - 5.20 M/uL    HGB 13.2 11.5 - 16.0 g/dL    HCT 40.5 35.0 - 47.0 %    MCV 92.5 80.0 - 99.0 FL    MCH 30.1 26.0 - 34.0 PG    MCHC 32.6 30.0 - 36.5 g/dL    RDW 13.2 11.5 - 14.5 %    PLATELET 335 878 - 997 K/uL    NEUTROPHILS 59 32 - 75 %    LYMPHOCYTES 29 12 - 49 %    MONOCYTES 11 5 - 13 %    EOSINOPHILS 1 0 - 7 %    BASOPHILS 0 0 - 1 %    ABS. NEUTROPHILS 6.5 1.8 - 8.0 K/UL    ABS. LYMPHOCYTES 3.2 0.8 - 3.5 K/UL    ABS. MONOCYTES 1.2 (H) 0.0 - 1.0 K/UL    ABS. EOSINOPHILS 0.1 0.0 - 0.4 K/UL    ABS.  BASOPHILS 0.0 0.0 - 0.1 K/UL   METABOLIC PANEL, COMPREHENSIVE    Collection Time: 05/22/17  9:31 PM   Result Value Ref Range    Sodium 139 136 - 145 mmol/L    Potassium 3.6 3.5 - 5.1 mmol/L    Chloride 105 97 - 108 mmol/L    CO2 26 21 - 32 mmol/L    Anion gap 8 5 - 15 mmol/L    Glucose 88 65 - 100 mg/dL    BUN 17 6 - 20 MG/DL    Creatinine 1.11 (H) 0.55 - 1.02 MG/DL    BUN/Creatinine ratio 15 12 - 20      GFR est AA 58 (L) >60 ml/min/1.73m2    GFR est non-AA 48 (L) >60 ml/min/1.73m2 Calcium 9.5 8.5 - 10.1 MG/DL    Bilirubin, total 0.3 0.2 - 1.0 MG/DL    ALT (SGPT) 29 12 - 78 U/L    AST (SGOT) 20 15 - 37 U/L    Alk.  phosphatase 97 45 - 117 U/L    Protein, total 8.0 6.4 - 8.2 g/dL    Albumin 4.2 3.5 - 5.0 g/dL    Globulin 3.8 2.0 - 4.0 g/dL    A-G Ratio 1.1 1.1 - 2.2     URINALYSIS W/MICROSCOPIC    Collection Time: 05/22/17 11:19 PM   Result Value Ref Range    Color YELLOW/STRAW      Appearance CLEAR CLEAR      Specific gravity 1.024 1.003 - 1.030      pH (UA) 5.0 5.0 - 8.0      Protein NEGATIVE  NEG mg/dL    Glucose NEGATIVE  NEG mg/dL    Ketone NEGATIVE  NEG mg/dL    Bilirubin NEGATIVE  NEG      Blood NEGATIVE  NEG      Urobilinogen 0.2 0.2 - 1.0 EU/dL    Nitrites NEGATIVE  NEG      Leukocyte Esterase NEGATIVE  NEG      WBC 0-4 0 - 4 /hpf    RBC 0-5 0 - 5 /hpf    Epithelial cells FEW FEW /lpf    Bacteria NEGATIVE  NEG /hpf    Hyaline cast 5-10 0 - 5 /lpf   OCCULT BLOOD, STOOL    Collection Time: 05/22/17 11:19 PM   Result Value Ref Range    Occult blood, stool POSITIVE (A) NEG     POC G3 - PUL    Collection Time: 05/23/17 12:05 AM   Result Value Ref Range    pH (POC) 7.407 7.35 - 7.45      pCO2 (POC) 36.8 35 - 45 MMHG    pO2 (POC) 88 80 - 100 MMHG    HCO3 (POC) 23.1 22 - 26 MMOL/L    sO2 (POC) 97 92 - 97 %    Base deficit (POC) 2 mmol/L    Site LEFT BRACHIAL      Device: ROOM AIR      Allens test (POC) YES      Specimen type (POC) ARTERIAL

## 2017-05-23 NOTE — ED NOTES
Pt given discharge instructions. Verbalizes understanding. VSS.   Left ambulatory with all belongings to [address] [place] [address] to await ride

## 2017-05-23 NOTE — ED PROVIDER NOTES
HPI Comments: 77 y.o. female with past medical history significant for lung nodule, HTN, AFib, breast cancer, DVT, colon cancer, and hyperlipidemia who presents from home via EMS with chief complaint of vaginal discharge. Pt reports that she was at a grief workshop earlier tonight when she got up and felt water coming out of her vagina. Pt went out to the parking lot and the feeling persisted. Pt went home and found that her clothes were covered in a brown discharge that is odorless. Pt reports that she has had chronic diarrhea for the last 5 to 6 weeks. Pt has seen her PCP for this problem and has given 5 stool samples. Pt does not know the result of these studies. Pt reports that her diarrhea had stopped and this discharge does not seem to be diarrhea. Pt has been on Eliquis since 6/16. Pt reports prior colon surgery before 2010. Pt denies other bleeding and lightheadedness. There are no other acute medical concerns at this time. Social hx: nonsmoker, EtOH use  PCP: Jamil Verdin MD    Note written by Denise Lane, as dictated by Clint Arroyo MD 12:02 AM      The history is provided by the patient. No  was used. Past Medical History:   Diagnosis Date    Breast cancer ([address] 3/18/2009    XRT;      Colon cancer ([address]     DVT of leg (deep venous thrombosis) ([facility] [address] 3/18/2009    Left Leg; ? Tamoxifen     Essential hypertension     Lung nodule 10/1/2009    Mixed hyperlipidemia 3/18/2009    Paroxysmal a-fib (HCC)     Rectal polyp 3/18/2009    -adenocarcinoma Stage 1        Past Surgical History:   Procedure Laterality Date    CARDIOVERSION EXTERNAL  9/18/2013         ENDOSCOPY, COLON, DIAGNOSTIC      8/09 neg         Family History:   Problem Relation Age of Onset    Stroke Father     Stroke Brother        Social History     Social History    Marital status:      Spouse name: N/A    Number of children: N/A    Years of education: N/A     Occupational History    Not on file. Social History Main Topics    Smoking status: Never Smoker    Smokeless tobacco: Never Used    Alcohol use 1.0 oz/week     2 Glasses of wine per week      Comment: occasional    Drug use: No    Sexual activity: Not on file     Other Topics Concern    Not on file     Social History Narrative         ALLERGIES: Ciprofloxacin    Review of Systems   Constitutional: Negative for chills and fever. HENT: Negative for congestion, nosebleeds and rhinorrhea. Eyes: Negative for pain and redness. Respiratory: Negative for cough and shortness of breath. Cardiovascular: Negative for chest pain and palpitations. Gastrointestinal: Negative for abdominal pain, nausea and vomiting. Genitourinary: Positive for vaginal discharge. Negative for dysuria, frequency, vaginal bleeding and vaginal pain. Musculoskeletal: Negative for myalgias. Skin: Negative for rash and wound. Neurological: Negative for seizures, syncope and weakness. Hematological: Does not bruise/bleed easily. Psychiatric/Behavioral: Negative for agitation, confusion, dysphoric mood and suicidal ideas. The patient is not nervous/anxious. All other systems reviewed and are negative. Vitals:    05/22/17 2123   BP: 145/76   Pulse: 85   Resp: 16   Temp: 97.9 °F (36.6 °C)   SpO2: 97%            Physical Exam   Constitutional: She is oriented to person, place, and time. She appears well-developed and well-nourished. HENT:   Head: Normocephalic and atraumatic. Eyes: EOM are normal. Pupils are equal, round, and reactive to light. Neck: Normal range of motion. Neck supple. No tracheal deviation present. Cardiovascular: Normal rate, regular rhythm, normal heart sounds and intact distal pulses. Pulmonary/Chest: Effort normal and breath sounds normal. No stridor. No respiratory distress. She has no wheezes. She has no rales. She exhibits no tenderness. Abdominal: Soft.  Bowel sounds are normal. She exhibits no distension. There is no tenderness. There is no rebound. Musculoskeletal: Normal range of motion. She exhibits no edema or tenderness. Neurological: She is alert and oriented to person, place, and time. No cranial nerve deficit. Skin: Skin is warm and dry. No rash noted. No pallor. Psychiatric: She has a normal mood and affect. Nursing note and vitals reviewed. Note written by Denise Holcomb, as dictated by Bernard Jose MD 12:03 AM      MDM  Number of Diagnoses or Management Options  DUB (dysfunctional uterine bleeding):   Diagnosis management comments: 78-year-old female with past medical history significant for hypertension, A. fib, breast cancer, colon cancer, high cholesterol presents with complaints of copious brown vaginal discharge. Patient reports she initially thought it was urine. Patient on Eliquis. Denies lightheadedness, syncope. Patient is well-appearing, hemodynamically stable, no acute distress, no respiratory distress. Plan-CBC/CMP/UA, sample of discharge sent for occult blood testing. labs unremarkable         Amount and/or Complexity of Data Reviewed  Clinical lab tests: ordered and reviewed    Risk of Complications, Morbidity, and/or Mortality  Presenting problems: low  Diagnostic procedures: low  Management options: low    Patient Progress  Patient progress: stable    ED Course       Procedures        Patient's results have been reviewed with them. Patient and/or family have verbally conveyed their understanding and agreement of the patient's signs, symptoms, diagnosis, treatment and prognosis and additionally agree to follow up as recommended or return to the Emergency Room should their condition change prior to follow-up.   Discharge instructions have also been provided to the patient with some educational information regarding their diagnosis as well a list of reasons why they would want to return to the ER prior to their follow-up appointment should their condition change.

## 2017-05-23 NOTE — MR AVS SNAPSHOT
Visit Information Date & Time Provider Department Dept. Phone Encounter #  
 5/23/2017  9:15 AM Iker Anderson MD [facility] [address] [id number] Your Appointments 5/24/2017  1:40 PM  
New Patient with MD Chato Mcrae Joshua ([facility]) Appt Note: NPP c/o PMB, seen in ER 5/21/17/M/appt made by Dr. [facility] office 6 [facility] [address] [place] [phone]  
  
   
 [facility] [address]  
  
    
 10/3/2017 10:20 AM  
ESTABLISHED PATIENT with Reese Powers MD  
[facility] ([facility] SCHEDULING) Appt Note: 6 month follow up  
 [address]  
[address] [facility] 7 [phone] Upcoming Health Maintenance Date Due Pneumococcal 65+ High/Highest Risk (2 of 2 - PPSV23) 10/18/2011 GLAUCOMA SCREENING Q2Y 3/27/2017 MEDICARE YEARLY EXAM 7/19/2017 INFLUENZA AGE 9 TO ADULT 8/1/2017 COLONOSCOPY 8/20/2019 DTaP/Tdap/Td series (2 - Td) 1/18/2022 Allergies as of 5/23/2017  Review Complete On: 5/23/2017 By: Rojelio Caruso LPN Severity Noted Reaction Type Reactions Ciprofloxacin  10/01/2006    Hives Current Immunizations  Reviewed on 1/5/2015 Name Date Influenza Vaccine 10/15/2015, 1/5/2015 Influenza Vaccine Split 10/20/2011 12:54 PM  
 Pneumococcal Vaccine (Unspecified Type) 10/18/2006 TDAP Vaccine 1/18/2012 Zoster 7/1/2007 Not reviewed this visit You Were Diagnosed With   
  
 Codes Comments Post-menopausal bleeding    -  Primary ICD-10-CM: N95.0 ICD-9-CM: 627.1 Blood in stool     ICD-10-CM: K92.1 ICD-9-CM: 578.1 Malignant neoplasm of colon, unspecified part of colon ([facility] [address]     ICD-10-CM: C18.9 ICD-9-CM: 153.9 Vitals BP Pulse Temp Resp Height(growth percentile) Weight(growth percentile)  
 119/69 (BP 1 Location: Left arm, BP Patient Position: Sitting) 88 97.7 °F (36.5 °C) (Oral) 16 5' 5\" (1.651 m) 128 lb (58.1 kg) SpO2 BMI OB Status Smoking Status 98% 21.3 kg/m2 Postmenopausal Never Smoker Vitals History BMI and BSA Data Body Mass Index Body Surface Area  
 21.3 kg/m 2 1.63 m 2 Preferred Pharmacy Pharmacy Name Phone [address], [address] [phone] Your Updated Medication List  
  
   
This list is accurate as of: 5/23/17 10:07 AM.  Always use your most recent med list.  
  
  
  
  
 ALEVE 220 mg Cap Generic drug:  naproxen sodium Take  by mouth daily as needed. ALLBEE/C PO Take 1 Tab by mouth daily. ALPRAZolam 0.5 mg tablet Commonly known as:  XANAX  
TAKE 1 TABLET BY MOUTH NIGHTLY AS NEEDED FOR ANXIETY. atorvastatin 10 mg tablet Commonly known as:  LIPITOR  
TAKE 1/2 TABLET BY MOUTH EVERY DAY. cholecalciferol 400 unit Tab tablet Commonly known as:  VITAMIN D3 Take 800 Units by mouth daily. clobetasol 0.05 % topical cream  
Commonly known as:  De Rias Apply  to affected area two (2) times a day. colestipol 1 gram tablet Commonly known as:  COLESTID Take 1 Tab by mouth two (2) times a day. dilTIAZem  mg ER capsule Commonly known as:  CARDIZEM CD  
TAKE 1 CAPSULE BY MOUTH EVERY DAY. ELIQUIS 2.5 mg tablet Generic drug:  apixaban TAKE 1 TABLET BY MOUTH 2 TIMES A DAY. metoprolol succinate 50 mg XL tablet Commonly known as:  TOPROL-XL  
TAKE 1 TABLET BY MOUTH DAILY. triamcinolone acetonide 0.1 % ointment Commonly known as:  KENALOG Apply  to affected area two (2) times a day. use thin layer TYLENOL PM PO Take  by mouth as needed. ZyrTEC 10 mg tablet Generic drug:  cetirizine Take 10 mg by mouth as needed. We Performed the Following REFERRAL TO GASTROENTEROLOGY [WUV19 Custom] Comments:  
 Please evaluate patient for blood in stool. REFERRAL TO GYNECOLOGY [REF30 Custom] Comments:  
 Please evaluate patient for post-menopausal bleeding. APPT HAS BEEN SCHEDULED FOR TOMORROW 5/24/17 WITH DR Manju Avelar WITH [facility] AT 1:40. PLEASE ARRIVE AT 1:30 FOR CHECK-IN. To-Do List   
 05/23/2017 Imaging:  CT ABD PELV WO CONT   
  
 05/23/2017 2:30 PM  
  Appointment with [facility] 1 at [facility] CT ([phone]) NON-CONTRAST STUDY: 1. The patient should not eat solid food four hours before the appointment but should be encouraged to drink clear liquids. 2. Bring any non [facility] facility films/images pertaining to the area of interest with you on the day of appointment. 3. Check in at registration at least 30 minutes before appt time unless you were instructed to do otherwise. 4. If you have to drink oral contrast please pick it up any weekday prior to your appointment, if you cannot please check in 2 hrs  before appt time. Referral Information Referral ID Referred By Referred To  
  
 [id number] [facility] ALINA FERREIRA [facility], [address] [facility],[address], [place]   
   [address] Phone: [phone] Fax: [phone] Visits Status Start Date End Date 1 New Request 5/23/17 5/23/18 If your referral has a status of pending review or denied, additional information will be sent to support the outcome of this decision. Referral ID Referred By Referred To  
 [id number] [facility] [address] [facility] Trip [phone] [facility], [address] [facility] Visits Status Start Date End Date 1 New Request 5/23/17 5/23/18 If your referral has a status of pending review or denied, additional information will be sent to support the outcome of this decision. Referral ID Referred By Referred To [id number] [facility] Not Available Visits Status Start Date End Date 1 New Request 5/23/17 5/23/18 If your referral has a status of pending review or denied, additional information will be sent to support the outcome of this decision. Introducing [facility]! Julieta Ribera introduces [employer] patient portal. Now you can access parts of your medical record, email your doctor's office, and request medication refills online. 1. In your internet browser, go to [email or web address] 2. Click on the First Time User? Click Here link in the Sign In box. You will see the New Member Sign Up page. 3. Enter your [employer] Access Code exactly as it appears below. You will not need to use this code after youve completed the sign-up process. If you do not sign up before the expiration date, you must request a new code. · [employer] Access Code: [email or web address] Expires: 7/3/2017 10:45 AM 
 
4. Enter the last four digits of your Social Security Number (xxxx) and Date of Birth (mm/dd/yyyy) as indicated and click Submit. You will be taken to the next sign-up page. 5. Create a [employer] ID. This will be your [employer] login ID and cannot be changed, so think of one that is secure and easy to remember. 6. Create a [employer] password. You can change your password at any time. 7. Enter your Password Reset Question and Answer. This can be used at a later time if you forget your password. 8. Enter your e-mail address. You will receive e-mail notification when new information is available in [address] 9. Click Sign Up. You can now view and download portions of your medical record. 10. Click the Download Summary menu link to download a portable copy of your medical information. If you have questions, please visit the Frequently Asked Questions section of the [employer] website. Remember, [employer] is NOT to be used for urgent needs. For medical emergencies, dial 911. Now available from your iPhone and Android! Please provide this summary of care documentation to your next provider. Your primary care clinician is listed as Bal 68. If you have any questions after today's visit, please call [phone].

## 2017-05-23 NOTE — ED NOTES
Assumed care of patient. Brought back from [facility] via [address]. Assisted patient with changing clothes. Pt has 2 briefs saturated with dark liquid. Pt states its coming from her vagina. Does not smell like stool. Provided with warm blanket and call bell. Awaiting eval by PA.  Pt denies any pain

## 2017-05-24 ENCOUNTER — TELEPHONE (OUTPATIENT)
Dept: INTERNAL MEDICINE CLINIC | Age: 77
End: 2017-05-24

## 2017-05-24 ENCOUNTER — OFFICE VISIT (OUTPATIENT)
Dept: OBGYN CLINIC | Age: 77
End: 2017-05-24

## 2017-05-24 ENCOUNTER — PATIENT OUTREACH (OUTPATIENT)
Dept: INTERNAL MEDICINE CLINIC | Age: 77
End: 2017-05-24

## 2017-05-24 DIAGNOSIS — N95.0 POSTMENOPAUSAL BLEEDING: Primary | ICD-10-CM

## 2017-05-24 RX ORDER — TRAMADOL HYDROCHLORIDE 50 MG/1
50 TABLET ORAL
Qty: 10 TAB | Refills: 0 | OUTPATIENT
Start: 2017-05-24 | End: 2018-06-04

## 2017-05-24 NOTE — PROGRESS NOTES
[facility][note 2]    Patient on discharge report dated 5/23/17 from [facility]. Left message on voicemail. Will attempt to contact again. Need to complete post-discharge assessment. Pt noted in for f/u with PCP Dr. Octavio Alonzo on 5/23/17.

## 2017-05-24 NOTE — PROGRESS NOTES
She is seeing dr Haily Granados gyn today-marina make sure they have this report showing enlarged uterus?  thanks

## 2017-05-24 NOTE — MR AVS SNAPSHOT
Visit Information Date & Time Provider Department Dept. Phone Encounter #  
 5/24/2017  1:40 PM Tano Juares, Radha Ring e [phone] [id number] Your Appointments 6/6/2017  9:45 AM  
ULTRASOUND with MARIAMA Arciniega ([address]) Appt Note: u/s gyn( PMB) f/u w/ FW after [address] [place] [address]  
[phone]  
  
   
 [address]  
  
    
 6/6/2017 10:30 AM  
Follow Up with MD Chato Tompkins ([address]) Appt Note: u/s gyn( PMB) f/u w/ FW [address] [place] [phone]  
  
   
 [facility] [address]  
  
    
 10/3/2017 10:20 AM  
ESTABLISHED PATIENT with Pool Vaca MD  
[facility]: 6 month follow up  
 [address]  
[address],[facility] [address] [facility] 7 [phone] Upcoming Health Maintenance Date Due INFLUENZA AGE 9 TO ADULT 8/1/2017 COLONOSCOPY 8/20/2019 Allergies as of 5/24/2017  Review Complete On: 5/23/2017 By: Yanni Larry LPN Severity Noted Reaction Type Reactions Ciprofloxacin  10/01/2006    Hives Current Immunizations  Reviewed on 1/5/2015 Name Date Influenza Vaccine 10/15/2015, 1/5/2015 Influenza Vaccine Split 10/20/2011 12:54 PM  
 Pneumococcal Vaccine (Unspecified Type) 10/18/2006 TDAP Vaccine 1/18/2012 Zoster 7/1/2007 Not reviewed this visit Vitals OB Status Smoking Status Postmenopausal Never Smoker Preferred Pharmacy Pharmacy Name Phone [address], [address] [facility] [phone] Your Updated Medication List  
  
   
 This list is accurate as of: 5/24/17 11:59 PM.  Always use your most recent med list.  
  
  
  
  
 ALEVE 220 mg Cap Generic drug:  naproxen sodium Take  by mouth daily as needed. ALLBEE/C PO Take 1 Tab by mouth daily. ALPRAZolam 0.5 mg tablet Commonly known as:  XANAX  
TAKE 1 TABLET BY MOUTH NIGHTLY AS NEEDED FOR ANXIETY. atorvastatin 10 mg tablet Commonly known as:  LIPITOR  
TAKE 1/2 TABLET BY MOUTH EVERY DAY. cholecalciferol 400 unit Tab tablet Commonly known as:  VITAMIN D3 Take 800 Units by mouth daily. clobetasol 0.05 % topical cream  
Commonly known as:  Drena Regulus Apply  to affected area two (2) times a day. colestipol 1 gram tablet Commonly known as:  COLESTID Take 1 Tab by mouth two (2) times a day. dilTIAZem  mg ER capsule Commonly known as:  CARDIZEM CD  
TAKE 1 CAPSULE BY MOUTH EVERY DAY. ELIQUIS 2.5 mg tablet Generic drug:  apixaban TAKE 1 TABLET BY MOUTH 2 TIMES A DAY. metoprolol succinate 50 mg XL tablet Commonly known as:  TOPROL-XL  
TAKE 1 TABLET BY MOUTH DAILY. traMADol 50 mg tablet Commonly known as:  ULTRAM  
Take 1 Tab by mouth every six (6) hours as needed for Pain. Max Daily Amount: 200 mg.  
  
 triamcinolone acetonide 0.1 % ointment Commonly known as:  KENALOG Apply  to affected area two (2) times a day. use thin layer TYLENOL PM PO Take  by mouth as needed. ZyrTEC 10 mg tablet Generic drug:  cetirizine Take 10 mg by mouth as needed. Please provide this summary of care documentation to your next provider. Your primary care clinician is listed as Bal Martin. If you have any questions after today's visit, please call [phone].

## 2017-05-24 NOTE — TELEPHONE ENCOUNTER
Discussed her ct report-she did see gyn dr Silvana Morillo today and will have pelvic us next week and possibly biopsy as well  Concerned re pain with bleeding-will call in ultram prn  She made an appt at colorectal specialist with dr Loli Mclean  Discussed stopping eliquis if bleeding becomes more significant from vaginal area

## 2017-05-25 ENCOUNTER — TELEPHONE (OUTPATIENT)
Dept: OBGYN CLINIC | Age: 77
End: 2017-05-25

## 2017-05-25 LAB
A VAGINAE DNA VAG QL NAA+PROBE: NORMAL SCORE
BVAB2 DNA VAG QL NAA+PROBE: NORMAL SCORE
C ALBICANS DNA VAG QL NAA+PROBE: NEGATIVE
C GLABRATA DNA VAG QL NAA+PROBE: NEGATIVE
MEGA1 DNA VAG QL NAA+PROBE: NORMAL SCORE
T VAGINALIS RRNA SPEC QL NAA+PROBE: NEGATIVE

## 2017-05-25 NOTE — PROGRESS NOTES
Postmenopausal bleeding note      Leidy Humphreys is a 77 y.o. female who complains of vaginal bleeding after menopause. She became menopausal approximately many years ago. She has had vaginal bleeding which she describes as light lasting up to several days. Associated symptoms include none. Alleviating factors: none    Aggravating factors: none      The patient is not currently sexually active. She was seen in the ER and had a ct scan done- see encounters. Previous CT performed which showed lever lesions and a mass vs clot in the uterus. Her relevant past medical history:   Past Medical History:   Diagnosis Date    Breast cancer ([address] 3/18/2009    XRT;      Colon cancer ([facility]     DVT of leg (deep venous thrombosis) ([facility] [address] 3/18/2009    Left Leg; ? Tamoxifen     Essential hypertension     Lung nodule 10/1/2009    Mixed hyperlipidemia 3/18/2009    Paroxysmal a-fib ([facility] [address]     Rectal polyp 3/18/2009    -adenocarcinoma Stage 1         Past Surgical History:   Procedure Laterality Date    CARDIOVERSION EXTERNAL  9/18/2013         ENDOSCOPY, COLON, DIAGNOSTIC      8/09 neg     Social History     Occupational History    Not on file. Social History Main Topics    Smoking status: Never Smoker    Smokeless tobacco: Never Used    Alcohol use 1.0 oz/week     2 Glasses of wine per week      Comment: occasional    Drug use: No    Sexual activity: Not on file     Family History   Problem Relation Age of Onset    Stroke Father     Stroke Brother        Allergies   Allergen Reactions    Ciprofloxacin Hives     Prior to Admission medications    Medication Sig Start Date End Date Taking? Authorizing Provider   traMADol (ULTRAM) 50 mg tablet Take 1 Tab by mouth every six (6) hours as needed for Pain. Max Daily Amount: 200 mg. 5/24/17   Daniela Selby MD   clobetasol (TEMOVATE) 0.05 % topical cream Apply  to affected area two (2) times a day.  5/15/17   Daniela Selby MD   colestipol (COLESTID) 1 gram tablet Take 1 Tab by mouth two (2) times a day. 5/15/17   Eulogio Asif MD   metoprolol succinate (TOPROL-XL) 50 mg XL tablet TAKE 1 TABLET BY MOUTH DAILY. 5/3/17   Ophelia Vergara MD   ALPRAZolam (XANAX) 0.5 mg tablet TAKE 1 TABLET BY MOUTH NIGHTLY AS NEEDED FOR ANXIETY. 4/13/17   Jordon Nicole MD   ACETAMINOPHEN/DIPHENHYDRAMINE (TYLENOL PM PO) Take  by mouth as needed. Historical Provider   ELIQUIS 2.5 mg tablet TAKE 1 TABLET BY MOUTH 2 TIMES A DAY. 12/29/16   Ophelia Vergara MD   atorvastatin (LIPITOR) 10 mg tablet TAKE 1/2 TABLET BY MOUTH EVERY DAY. 12/19/16   Ophelia Vergara MD   dilTIAZem CD (CARDIZEM CD) 240 mg ER capsule TAKE 1 CAPSULE BY MOUTH EVERY DAY. 12/19/16   Ophelia Vergara MD   naproxen sodium (ALEVE) 220 mg cap Take  by mouth daily as needed. Historical Provider   cholecalciferol (VITAMIN D3) 400 unit tab tablet Take 800 Units by mouth daily. Historical Provider   triamcinolone acetonide (KENALOG) 0.1 % ointment Apply  to affected area two (2) times a day. use thin layer  Patient taking differently: Apply  to affected area as needed. use thin layer 7/18/16   Rey Tony MD   cetirizine (ZYRTEC) 10 mg tablet Take 10 mg by mouth as needed. Historical Provider   B COMPLEX WITH VITAMIN C (ALLBEE/C PO) Take 1 Tab by mouth daily.     Historical Provider        Review of Systems - History obtained from the patient  Constitutional: negative for weight loss, fever, night sweats  HEENT: negative for hearing loss, earache, congestion, snoring, sorethroat  CV: negative for chest pain, palpitations, edema  Resp: negative for cough, shortness of breath, wheezing  Breast: negative for breast lumps, nipple discharge, galactorrhea  GI: negative for change in bowel habits, abdominal pain, black or bloody stools  : negative for frequency, dysuria, hematuria  MSK: negative for back pain, joint pain, muscle pain  Skin: negative for itching, rash, hives  Neuro: negative for dizziness, headache, confusion, weakness  Psych: negative for anxiety, depression, change in mood  Heme/lymph: negative for bleeding, bruising, pallor      Objective: There were no vitals taken for this visit. Physical Exam:   PHYSICAL EXAMINATION    Constitutional  · Appearance: well-nourished, well developed, alert, in no acute distress    Gastrointestinal  · Abdominal Examination: abdomen non-tender to palpation, normal bowel sounds, no masses present  · Liver and spleen: no hepatomegaly present, spleen not palpable  · Hernias: no hernias identified    Genitourinary  · External Genitalia: normal appearance for age, no discharge present, no tenderness present, no inflammatory lesions present, no masses present, no atrophy present  · Vagina: normal vaginal vault without central or paravaginal defects, no discharge present, no inflammatory lesions present, no masses present  · Bladder: non-tender to palpation  · Urethra: appears normal  · Cervix: normal   · Uterus: normal size, shape and consistency  · Adnexa: no adnexal tenderness present, no adnexal masses present  · Perineum: perineum within normal limits, no evidence of trauma, no rashes or skin lesions present  · Anus: anus within normal limits, no hemorrhoids present  · Inguinal Lymph Nodes: no lymphadenopathy present    Skin  · General Inspection: no rash, no lesions identified    Neurologic/Psychiatric  · Mental Status:  · Orientation: grossly oriented to person, place and time  · Mood and Affect: mood normal, affect appropriate    Assessment/Plan:   Postmenopausal bleeding, will rto for ultrasound to better evaluate mass or clot in uterus. Possible endometrial biopsy at next visit. Discussed liver finding on CT, rec she discuss with her pcp. Instructions given to pt. Handouts given to pt.

## 2017-05-25 NOTE — TELEPHONE ENCOUNTER
Patient's daughter Umu Danny calling (Shannan Salvador verified to have all access) and wanted to know if she was needed to be with her mother at her next appointment since it contains an [address]. Ava Dove advised that it was up to her as Patient will have an [address] in our office and FW will discuss the results afterwards. Ava Dove verbalized understanding and wanted to know who ordered the CT scan and advised her PCP did.

## 2017-05-26 NOTE — PROGRESS NOTES
Notify her that vaginal swab was neg for infection-she should proceed with her ultrasound as planned with gyn

## 2017-05-27 LAB
C DIFF TOX A+B STL QL IA: NORMAL
CAMPYLOBACTER STL CULT: NORMAL
E COLI SXT STL QL IA: NEGATIVE
O+P SPEC MICRO: NORMAL
SALM + SHIG STL CULT: NORMAL
WBC STL QL MICRO: NORMAL

## 2017-05-29 LAB — HEMOCCULT STL QL IA: NEGATIVE

## 2017-05-31 ENCOUNTER — TELEPHONE (OUTPATIENT)
Dept: INTERNAL MEDICINE CLINIC | Age: 77
End: 2017-05-31

## 2017-06-01 ENCOUNTER — TELEPHONE (OUTPATIENT)
Dept: CARDIOLOGY CLINIC | Age: 77
End: 2017-06-01

## 2017-06-01 NOTE — TELEPHONE ENCOUNTER
Identifiers x 2. States that per Dr. Alessandro Rodriguez, discontinue eliquis. States vaginal bleeding has decreased. Feels like she is continuing with effects of eliquis. States has had GI symptoms of diarrhea. Awoke during night, sweating, episode of felling dizzy like her BP was low. Drank water with improvement noted. To have vaginal ultrasound with possible biopsy on 6-6-17 with Dr. Nelly Jasmine.

## 2017-06-01 NOTE — TELEPHONE ENCOUNTER
Radha Yao (pt's daughter) called to report her mother had been complaining of nausea but she did not report her symptoms to any of her doctors. Radha Yao asked could we call to check on her mother & see how she was feeling. I advised we would reach out & check on her clinically. Radha Yao requested that we not tell her mother she had called but I advised I am unable to withhold information like that from her. Radha Maximilian voiced understanding.

## 2017-06-01 NOTE — TELEPHONE ENCOUNTER
Patient states that she was advised by Dr Jason Murcia to discontinue Elliquis. Would like to discuss her symptoms(sweating,low bp). Requests a call back at [phone]. Thanks!

## 2017-06-06 ENCOUNTER — HOSPITAL ENCOUNTER (OUTPATIENT)
Dept: LAB | Age: 77
Discharge: HOME | End: 2017-06-06

## 2017-06-06 ENCOUNTER — OFFICE VISIT (OUTPATIENT)
Dept: OBGYN CLINIC | Age: 77
End: 2017-06-06

## 2017-06-06 DIAGNOSIS — N95.0 PMB (POSTMENOPAUSAL BLEEDING): Primary | ICD-10-CM

## 2017-06-06 NOTE — PROGRESS NOTES
Ultrasound followup    Guero Saucedo is a 77 y.o. female is here today to review the results of her ultrasound evaluation. Her U/S evaluation is performed because of a previous encounter revealing PMB which was identified a few weeks ago. She is here for an initial ultrasound study. The sonogram: abnormal findings. UTERUS IS ANTEVERTED, NORMAL IN SIZE AND ECHOGENICITY. THE ENDOMETRIUM IS THICKENED AND HETEROGENOUS. IT MEASURES 28-32MM IN THICKNESS. RIGHT ADNEXA APPEARS WITHIN NORMAL LIMITS. LEFT ADNEXA APPEARS WITHIN NORMAL LIMITS. NO FREE FLUID SEEN IN THE CDS. See detailed report for more information. Past Medical History:   Diagnosis Date    Breast cancer ([address] 3/18/2009    XRT;      Colon cancer ([address]     DVT of leg (deep venous thrombosis) ([facility] 3/18/2009    Left Leg; ? Tamoxifen     Essential hypertension     Lung nodule 10/1/2009    Mixed hyperlipidemia 3/18/2009    Paroxysmal a-fib ([address]     Rectal polyp 3/18/2009    -adenocarcinoma Stage 1      Past Surgical History:   Procedure Laterality Date    CARDIOVERSION EXTERNAL  9/18/2013         ENDOSCOPY, COLON, DIAGNOSTIC      8/09 neg     Social History     Occupational History    Not on file. Social History Main Topics    Smoking status: Never Smoker    Smokeless tobacco: Never Used    Alcohol use 1.0 oz/week     2 Glasses of wine per week      Comment: occasional    Drug use: No    Sexual activity: Not Currently     Family History   Problem Relation Age of Onset    Stroke Father     Stroke Brother        Allergies   Allergen Reactions    Ciprofloxacin Hives     Prior to Admission medications    Medication Sig Start Date End Date Taking? Authorizing Provider   traMADol (ULTRAM) 50 mg tablet Take 1 Tab by mouth every six (6) hours as needed for Pain. Max Daily Amount: 200 mg. 5/24/17   Saloni Corado MD   clobetasol (TEMOVATE) 0.05 % topical cream Apply  to affected area two (2) times a day.  5/15/17   Saloni Corado MD   colestipol (COLESTID) 1 gram tablet Take 1 Tab by mouth two (2) times a day. 5/15/17   Tho Lobo MD   metoprolol succinate (TOPROL-XL) 50 mg XL tablet TAKE 1 TABLET BY MOUTH DAILY. 5/3/17   Yolande Allen MD   ALPRAZolam (XANAX) 0.5 mg tablet TAKE 1 TABLET BY MOUTH NIGHTLY AS NEEDED FOR ANXIETY. 4/13/17   Soniya Collins MD   ACETAMINOPHEN/DIPHENHYDRAMINE (TYLENOL PM PO) Take  by mouth as needed. Historical Provider   ELIQUIS 2.5 mg tablet TAKE 1 TABLET BY MOUTH 2 TIMES A DAY. 12/29/16   Yolande Allen MD   atorvastatin (LIPITOR) 10 mg tablet TAKE 1/2 TABLET BY MOUTH EVERY DAY. 12/19/16   Yolande Allen MD   dilTIAZem CD (CARDIZEM CD) 240 mg ER capsule TAKE 1 CAPSULE BY MOUTH EVERY DAY. 12/19/16   Yolande Allen MD   naproxen sodium (ALEVE) 220 mg cap Take  by mouth daily as needed. Historical Provider   cholecalciferol (VITAMIN D3) 400 unit tab tablet Take 800 Units by mouth daily. Historical Provider   triamcinolone acetonide (KENALOG) 0.1 % ointment Apply  to affected area two (2) times a day. use thin layer  Patient taking differently: Apply  to affected area as needed. use thin layer 7/18/16   Karen Ritchie MD   cetirizine (ZYRTEC) 10 mg tablet Take 10 mg by mouth as needed. Historical Provider   B COMPLEX WITH VITAMIN C (ALLBEE/C PO) Take 1 Tab by mouth daily. Historical Provider        Review of Systems: History obtained from the patient  Constitutional: negative for weight loss, fever, night sweats  Breast: negative for breast lumps, nipple discharge, galactorrhea  GI: negative for change in bowel habits, abdominal pain, black or bloody stools  : negative for frequency, dysuria, hematuria, vaginal discharge  MSK: negative for back pain, joint pain, muscle pain  Skin: negative for itching, rash, hives  Psych: negative for anxiety, depression, change in mood      Objective: There were no vitals taken for this visit.     Physical Exam:   PHYSICAL EXAMINATION    Constitutional  · Appearance: well-nourished, well developed, alert, in no acute distress    Gastrointestinal  · Abdominal Examination: abdomen non-tender to palpation, normal bowel sounds, no masses present  · Liver and spleen: no hepatomegaly present, spleen not palpable  · Hernias: no hernias identified    Genitourinary  · External Genitalia: normal appearance for age, no discharge present, no tenderness present, no inflammatory lesions present, no masses present, no atrophy present  · Vagina: normal vaginal vault without central or paravaginal defects, no discharge present, no inflammatory lesions present, no masses present  · Bladder: non-tender to palpation  · Urethra: appears normal  · Cervix: normal   · Uterus: normal size, shape and consistency  · Adnexa: no adnexal tenderness present, no adnexal masses present  · Perineum: perineum within normal limits, no evidence of trauma, no rashes or skin lesions present  · Anus: anus within normal limits, no hemorrhoids present  · Inguinal Lymph Nodes: no lymphadenopathy present    Skin  · General Inspection: no rash, no lesions identified    Neurologic/Psychiatric  · Mental Status:  · Orientation: grossly oriented to person, place and time  · Mood and Affect: mood normal, affect appropriate      ASSESSMENT:    ICD-10-CM ICD-9-CM    1. PMB (postmenopausal bleeding) N95.0 627.1 SURGICAL PATHOLOGY   thickened endometrium, will post for hysteroscopy/D&C/possible polypectomy with Myosure. Will f/u with end bx results. PLAN:  Orders Placed This Encounter    SURGICAL PATHOLOGY     Order Specific Question:   Type of Specimen and Locations? Answer:   endometrial biopsy       RTO prn if symptoms persist or worsen. Instructions given to pt. Handouts given to pt.         [facility]  OFFICE PROCEDURE PROGRESS NOTE        Chart reviewed for the following:   IDeedee, have reviewed the History, Physical and updated the Allergic reactions for Leonarda Spikes performed immediately prior to start of procedure:   IFantasma, have performed the following reviews on Luke Humphreys prior to the start of the procedure:            * Patient was identified by name and date of birth   * Agreement on procedure being performed was verified  * Risks and Benefits explained to the patient  * Procedure site verified and marked as necessary  * Patient was positioned for comfort  * Consent was signed and verified     Time: 1008      Date of procedure: 6/6/2017    Procedure performed by:  Meena Patel MD    Provider assisted by: Sandy العراقي MA    Patient assisted by: daughter    How tolerated by patient: tolerated the procedure well with no complications    Post Procedural Pain Scale: 0 - No Hurt    Comments: none  Procedure note: Endometrial biopsy    Luke Brighter Day is a No obstetric history on file. ,  77 y.o. female [facility] No LMP recorded. Patient is postmenopausal.  The patient has a history of There were no encounter diagnoses. and presents for an endometrial biopsy. Indications:   After the indications, risks, benefits, and alternatives to performing an endometrial biopsy were explained to the patient, her questions were answered and informed consent was obtained. Procedure: The patient was placed on the table in the dorsal lithotomy position. A bimanual exam showed the uterus to be anterior. The uterus was not enlarged. A speculum was placed in the vagina. The cervix was visualized and prepped with zephrin. A tenaculum was placed on the anterior lip of the cervix for traction. It was not necessary to dilate the cervix. A pipelle was passed through the endocervical canal without difficulty. The uterus was sounded to 7 cm's. A moderate amount of tissue was returned. This tissue was placed in formalin and sent to pathology. It was felt that an adequate sample was obtained.    The patient tolerated the procedure well and she reported mild cramping. The tenaculum and speculum were removed. Post Procedural Status: The patient was observed for 10 minutes after the procedure. She had mild cramping at the time of discharge. There were no complications. The patient was discharged in stable condition.

## 2017-06-06 NOTE — PATIENT INSTRUCTIONS
Vaginal Bleeding After Menopause: Care Instructions  Your Care Instructions  Vaginal bleeding after menopause can have many causes, including infection, inflammation, prescription hormones, abnormal growths, and injury. Your doctor may want you to have more tests to find the cause of your vaginal bleeding. Follow-up care is a key part of your treatment and safety. Be sure to make and go to all appointments, and call your doctor if you are having problems. It's also a good idea to know your test results and keep a list of the medicines you take. How can you care for yourself at home? · If your doctor gave you medicine, take it exactly as prescribed. Call your doctor if you think you are having a problem with your medicine. · Do not have sex or put anything inside your vagina until you talk with your doctor. · Do not douche. When should you call for help? Call 911 anytime you think you may need emergency care. For example, call if:  · You passed out (lost consciousness). · You have sudden, severe pain in your belly or pelvis. Call your doctor now or seek immediate medical care if:  · You have severe vaginal bleeding. You are soaking through a pad each hour for 2 or more hours. · You are dizzy or lightheaded, or you feel like you may faint. · You have a fever. · You have new belly or pelvic pain. · You have vaginal discharge that smells bad. · You feel weak and tired, and your skin is pale. · Your bleeding gets worse. Watch closely for changes in your health, and be sure to contact your doctor if:  · You do not get better as expected. Where can you learn more? Go to http://alfred-lester.info/. Enter N304 in the search box to learn more about \"Vaginal Bleeding After Menopause: Care Instructions. \"  Current as of: October 13, 2016  Content Version: 11.2  © 5723-8011 [email or web address], [employer].  Care instructions adapted under license by [employer] (which disclaims liability or warranty for this information). If you have questions about a medical condition or this instruction, always ask your healthcare professional. [place] [address] any warranty or liability for your use of this information.

## 2017-06-06 NOTE — MR AVS SNAPSHOT
Visit Information Date & Time Provider Department Dept. Phone Encounter #  
 6/6/2017 10:30 AM MD Chato Chandler [phone] [id number] Your Appointments 6/6/2017 10:30 AM  
Follow Up with MD Chato Chandler ([address]) Appt Note: u/s gyn( PMB) f/u w/ FW after [address] [place] [address]  
[phone]  
  
   
 [address]  
  
    
 10/3/2017 10:20 AM  
ESTABLISHED PATIENT with Zackery Griffin MD  
[facility] ([facility] SCHEDUL[facility]) Appt Note: 6 month follow up  
 [address]  
[address] [facility] [phone] Upcoming Health Maintenance Date Due INFLUENZA AGE 9 TO ADULT 8/1/2017 COLONOSCOPY 8/20/2019 Allergies as of 6/6/2017  Review Complete On: 6/6/2017 By: Matthew Route Severity Noted Reaction Type Reactions Ciprofloxacin  10/01/2006    Hives Current Immunizations  Reviewed on 1/5/2015 Name Date Influenza Vaccine 10/15/2015, 1/5/2015 Influenza Vaccine Split 10/20/2011 12:54 PM  
 Pneumococcal Vaccine (Unspecified Type) 10/18/2006 TDAP Vaccine 1/18/2012 Zoster 7/1/2007 Not reviewed this visit You Were Diagnosed With   
  
 Codes Comments PMB (postmenopausal bleeding)    -  Primary ICD-10-CM: N95.0 ICD-9-CM: 627.1 Vitals OB Status Smoking Status Postmenopausal Never Smoker Preferred Pharmacy Pharmacy Name Phone [address], [address] [facility] [phone] Your Updated Medication List  
  
   
This list is accurate as of: 6/6/17 10:09 AM.  Always use your most recent med list.  
  
  
  
  
 ALEVE 220 mg Cap Generic drug:  naproxen sodium Take  by mouth daily as needed.   
  
 ALLBEE/C PO  
 Take 1 Tab by mouth daily. ALPRAZolam 0.5 mg tablet Commonly known as:  XANAX  
TAKE 1 TABLET BY MOUTH NIGHTLY AS NEEDED FOR ANXIETY. atorvastatin 10 mg tablet Commonly known as:  LIPITOR  
TAKE 1/2 TABLET BY MOUTH EVERY DAY. cholecalciferol 400 unit Tab tablet Commonly known as:  VITAMIN D3 Take 800 Units by mouth daily. clobetasol 0.05 % topical cream  
Commonly known as:  Joceline Slipper Apply  to affected area two (2) times a day. colestipol 1 gram tablet Commonly known as:  COLESTID Take 1 Tab by mouth two (2) times a day. dilTIAZem  mg ER capsule Commonly known as:  CARDIZEM CD  
TAKE 1 CAPSULE BY MOUTH EVERY DAY. ELIQUIS 2.5 mg tablet Generic drug:  apixaban TAKE 1 TABLET BY MOUTH 2 TIMES A DAY. metoprolol succinate 50 mg XL tablet Commonly known as:  TOPROL-XL  
TAKE 1 TABLET BY MOUTH DAILY. traMADol 50 mg tablet Commonly known as:  ULTRAM  
Take 1 Tab by mouth every six (6) hours as needed for Pain. Max Daily Amount: 200 mg.  
  
 triamcinolone acetonide 0.1 % ointment Commonly known as:  KENALOG Apply  to affected area two (2) times a day. use thin layer TYLENOL PM PO Take  by mouth as needed. ZyrTEC 10 mg tablet Generic drug:  cetirizine Take 10 mg by mouth as needed. We Performed the Following SURGICAL PATHOLOGY [VAL1910 Custom] Patient Instructions Vaginal Bleeding After Menopause: Care Instructions Your Care Instructions Vaginal bleeding after menopause can have many causes, including infection, inflammation, prescription hormones, abnormal growths, and injury. Your doctor may want you to have more tests to find the cause of your vaginal bleeding. Follow-up care is a key part of your treatment and safety. Be sure to make and go to all appointments, and call your doctor if you are having problems.  It's also a good idea to know your test results and keep a list of the medicines you take. How can you care for yourself at home? · If your doctor gave you medicine, take it exactly as prescribed. Call your doctor if you think you are having a problem with your medicine. · Do not have sex or put anything inside your vagina until you talk with your doctor. · Do not douche. When should you call for help? Call 911 anytime you think you may need emergency care. For example, call if: 
· You passed out (lost consciousness). · You have sudden, severe pain in your belly or pelvis. Call your doctor now or seek immediate medical care if: 
· You have severe vaginal bleeding. You are soaking through a pad each hour for 2 or more hours. · You are dizzy or lightheaded, or you feel like you may faint. · You have a fever. · You have new belly or pelvic pain. · You have vaginal discharge that smells bad. · You feel weak and tired, and your skin is pale. · Your bleeding gets worse. Watch closely for changes in your health, and be sure to contact your doctor if: 
· You do not get better as expected. Where can you learn more? Go to http://alfred-lester.info/. Enter N304 in the search box to learn more about \"Vaginal Bleeding After Menopause: Care Instructions. \" Current as of: October 13, 2016 Content Version: 11.2 © 6863-5497 [employer]. Care instructions adapted under license by [employer] (which disclaims liability or warranty for this information). If you have questions about a medical condition or this instruction, always ask your healthcare professional. [place] [address] any warranty or liability for your use of this information. Please provide this summary of care documentation to your next provider. Your primary care clinician is listed as [address]. If you have any questions after today's visit, please call [phone].

## 2017-06-15 NOTE — PROGRESS NOTES
Patient advised of MD reviewed labs and recommendations and verbalized understanding. This nurse confirmed date of patients surgery for 7/20/17. Patient verbalized understanding.

## 2017-06-20 ENCOUNTER — TELEPHONE (OUTPATIENT)
Dept: INTERNAL MEDICINE CLINIC | Age: 77
End: 2017-06-20

## 2017-06-20 ENCOUNTER — OFFICE VISIT (OUTPATIENT)
Dept: INTERNAL MEDICINE CLINIC | Age: 77
End: 2017-06-20

## 2017-06-20 ENCOUNTER — HOSPITAL ENCOUNTER (OUTPATIENT)
Dept: LAB | Age: 77
Discharge: HOME | End: 2017-06-20
Payer: MEDICARE

## 2017-06-20 ENCOUNTER — HOSPITAL ENCOUNTER (OUTPATIENT)
Dept: GENERAL RADIOLOGY | Age: 77
Discharge: HOME | End: 2017-06-20
Attending: INTERNAL MEDICINE
Payer: MEDICARE

## 2017-06-20 VITALS
TEMPERATURE: 98.3 F | RESPIRATION RATE: 16 BRPM | SYSTOLIC BLOOD PRESSURE: 114 MMHG | WEIGHT: 119.8 LBS | HEART RATE: 96 BPM | BODY MASS INDEX: 19.96 KG/M2 | DIASTOLIC BLOOD PRESSURE: 62 MMHG | HEIGHT: 65 IN | OXYGEN SATURATION: 96 %

## 2017-06-20 DIAGNOSIS — K76.9 LIVER LESION: ICD-10-CM

## 2017-06-20 DIAGNOSIS — R05.9 COUGH: ICD-10-CM

## 2017-06-20 DIAGNOSIS — R06.02 SOB (SHORTNESS OF BREATH): ICD-10-CM

## 2017-06-20 DIAGNOSIS — R93.89 ENDOMETRIAL THICKENING ON ULTRA SOUND: ICD-10-CM

## 2017-06-20 DIAGNOSIS — I48.19 PERSISTENT ATRIAL FIBRILLATION (HCC): ICD-10-CM

## 2017-06-20 DIAGNOSIS — R05.9 COUGH: Primary | ICD-10-CM

## 2017-06-20 DIAGNOSIS — C18.9 MALIGNANT NEOPLASM OF COLON, UNSPECIFIED PART OF COLON (HCC): ICD-10-CM

## 2017-06-20 PROCEDURE — 71020 XR CHEST PA LAT: CPT

## 2017-06-20 PROCEDURE — 36415 COLL VENOUS BLD VENIPUNCTURE: CPT

## 2017-06-20 PROCEDURE — 80053 COMPREHEN METABOLIC PANEL: CPT

## 2017-06-20 PROCEDURE — 85025 COMPLETE CBC W/AUTO DIFF WBC: CPT

## 2017-06-20 RX ORDER — ATORVASTATIN CALCIUM 10 MG/1
TABLET, FILM COATED ORAL
Qty: 45 TAB | Refills: 1 | Status: SHIPPED | OUTPATIENT
Start: 2017-06-20 | End: 2017-09-18 | Stop reason: SDUPTHER

## 2017-06-20 RX ORDER — AMOXICILLIN AND CLAVULANATE POTASSIUM 875; 125 MG/1; MG/1
1 TABLET, FILM COATED ORAL EVERY 12 HOURS
Qty: 20 TAB | Refills: 0 | Status: SHIPPED | OUTPATIENT
Start: 2017-06-20 | End: 2017-06-28 | Stop reason: SDUPTHER

## 2017-06-20 RX ORDER — DILTIAZEM HYDROCHLORIDE 240 MG/1
CAPSULE, COATED, EXTENDED RELEASE ORAL
Qty: 90 CAP | Refills: 1 | Status: SHIPPED | OUTPATIENT
Start: 2017-06-20 | End: 2017-09-18 | Stop reason: SDUPTHER

## 2017-06-20 NOTE — TELEPHONE ENCOUNTER
Incoming call from patient complaining of ongoing cough & wheezing. She went walking with her grand children yesterday and by the time she got back she was very sob. Patient is now coughing up mucus & complains she is still a sob due to her cough. Patient denies chest pain, fever & chills, headaches, dizziness. Reviewed her history, patient does have a history of blood dvt so advised appt today for evaluation of sob. She feels she needs an inhaler to help. Patient worked in with [employer] [place], [place] [address]Replenish today at 3:00. Advised she head to the ER sooner if she becomes significantly weak or sob worsens. Patient voiced understanding.

## 2017-06-20 NOTE — PROGRESS NOTES
HISTORY OF PRESENT ILLNESS  Loulou Humphreys is a 77 y.o. female. HPI  Per recent CT abd/pelvis is low density within the uterine cavity . This was biopsied by [facility] which was neg and she has a D and C scheduled in July. CT scan saw lesion in liver and recommend MRI of liver. Saw Dr. Lizz Hutchins because of vaginal bleeding and did US and found that patient had thickened wall of endometrium. .    CT scan showed bronchiextysis and nodules in lungs. Patient is unsure if this is an old or new diagnosis and never saw pulm in the past    Patient presents today for onset of cough and SOB. - she has had wieght loss and and a lot of fatigue and cannot find her pep. She feels as though this is an acute change over the las tmonth    She states that she has been coughing up phlegm. Patient notes that she has been doing that for the past 6 weeks. Patient reports that she has been sitting a lot and decreased energy. She states that she has been feeling an increased SOB with activity. She states that this is unusual for her. Patient has denied an increase of stress in her life. She states that she had attended grief therapy. Patient will see Dr. Cyril Patel for colonoscopy. This is set for the summer    Patient has bee off Eloquis for about 3 weeks and is now taking 325 mg of aspirin for AFIB. Review of Systems   All other systems reviewed and are negative. Physical Exam   Constitutional: She is oriented to person, place, and time. She appears well-developed and well-nourished. HENT:   Head: Normocephalic and atraumatic. Right Ear: External ear normal.   Left Ear: External ear normal.   Nose: Nose normal.   Mouth/Throat: Oropharynx is clear and moist.   Eyes: Conjunctivae and EOM are normal.   Neck: Normal range of motion. Neck supple. Carotid bruit is not present. No thyroid mass and no thyromegaly present. Cardiovascular: Normal rate, regular rhythm, S1 normal, S2 normal, normal heart sounds and intact distal pulses. Pulmonary/Chest: Effort normal. She has wheezes. Trace amounts of wheezing. Some decreased breath sounds in bases   Abdominal: Soft. Normal appearance and bowel sounds are normal. There is no hepatosplenomegaly. There is no tenderness. Musculoskeletal: Normal range of motion. Neurological: She is alert and oriented to person, place, and time. She has normal strength. No cranial nerve deficit or sensory deficit. Coordination normal.   Skin: Skin is warm, dry and intact. No abrasion and no rash noted. Psychiatric: She has a normal mood and affect. Her behavior is normal. Judgment and thought content normal.   Nursing note and vitals reviewed. ASSESSMENT and PLAN  Hellen Mae was seen today for cough. Diagnoses and all orders for this visit:    Cough  Ordered chest X-Ray. Chest CT showed  bronchiextysis and nodules in lungs. Does she have an infectious process or something else I am not sure/  -     XR CHEST PA LAT; Future  -     amoxicillin-clavulanate (AUGMENTIN) 875-125 mg per tablet; Take 1 Tab by mouth every twelve (12) hours. Endometrial thickening on ultra sound  Following up with Dr. Duke Montiel. Liver lesion  Ordered MRI. CT scan saw lesion in liver and recommend MRI. Pt has had more weight loss and fatigue and she is not trying to lose weight and states she is not depressed  -     MRI ABD W WO CONT; Future    Persistent atrial fibrillation (HCC)  Continue to take aspirin 325 mg. Follow up with pulmonologist.     Malignant neoplasm of colon, unspecified part of colon ([facility] [address]  Stable. Seeing  this summer    SOB (shortness of breath)  Check labs. Follow up with Dr. Dimas Shipley.  Bot sure if this is chronic or acute- CXR should help- treat if there is an infection as she does have some wheeze- we could do a CT of chest as well if needed  -     CBC WITH AUTOMATED DIFF  -     METABOLIC PANEL, COMPREHENSIVE    lab results and schedule of future lab studies reviewed with patient  reviewed diet, exercise and weight control    Written by Rabia Rawls, as dictated by Valdemar Simpson MD.     Current diagnosis and concerns discussed with pt at length. Understands risks and benefits or current treatment plan and medications and accepts the treatment and medication with any possible risks. Pt asks appropriate questions which were answered. Pt instructed to call with any concerns or problems.

## 2017-06-20 NOTE — PROGRESS NOTES
Can you call her - they are recommending a CT of the chest to look at nodule closer - is she willing to do this? Maybe we can do at the same time as the MRI abdomen?  I have not ordered it yet

## 2017-06-21 ENCOUNTER — TELEPHONE (OUTPATIENT)
Dept: INTERNAL MEDICINE CLINIC | Age: 77
End: 2017-06-21

## 2017-06-21 DIAGNOSIS — R91.1 ABNORMAL X-RAY OF LUNGS WITH SINGLE PULMONARY NODULE: Primary | ICD-10-CM

## 2017-06-21 LAB
ALBUMIN SERPL-MCNC: 4.3 G/DL (ref 3.5–4.8)
ALBUMIN/GLOB SERPL: 1.4 {RATIO} (ref 1.2–2.2)
ALP SERPL-CCNC: 95 IU/L (ref 39–117)
ALT SERPL-CCNC: 13 IU/L (ref 0–32)
AST SERPL-CCNC: 16 IU/L (ref 0–40)
BASOPHILS # BLD AUTO: 0 X10E3/UL (ref 0–0.2)
BASOPHILS NFR BLD AUTO: 0 %
BILIRUB SERPL-MCNC: 0.2 MG/DL (ref 0–1.2)
BUN SERPL-MCNC: 20 MG/DL (ref 8–27)
BUN/CREAT SERPL: 24 (ref 12–28)
CALCIUM SERPL-MCNC: 9.8 MG/DL (ref 8.7–10.3)
CHLORIDE SERPL-SCNC: 101 MMOL/L (ref 96–106)
CO2 SERPL-SCNC: 24 MMOL/L (ref 18–29)
CREAT SERPL-MCNC: 0.85 MG/DL (ref 0.57–1)
EOSINOPHIL # BLD AUTO: 0.1 X10E3/UL (ref 0–0.4)
EOSINOPHIL NFR BLD AUTO: 1 %
ERYTHROCYTE [DISTWIDTH] IN BLOOD BY AUTOMATED COUNT: 13.5 % (ref 12.3–15.4)
GLOBULIN SER CALC-MCNC: 3 G/DL (ref 1.5–4.5)
GLUCOSE SERPL-MCNC: 115 MG/DL (ref 65–99)
HCT VFR BLD AUTO: 38.9 % (ref 34–46.6)
HGB BLD-MCNC: 12.8 G/DL (ref 11.1–15.9)
IMM GRANULOCYTES # BLD: 0 X10E3/UL (ref 0–0.1)
IMM GRANULOCYTES NFR BLD: 0 %
LYMPHOCYTES # BLD AUTO: 2.6 X10E3/UL (ref 0.7–3.1)
LYMPHOCYTES NFR BLD AUTO: 18 %
MCH RBC QN AUTO: 29.8 PG (ref 26.6–33)
MCHC RBC AUTO-ENTMCNC: 32.9 G/DL (ref 31.5–35.7)
MCV RBC AUTO: 91 FL (ref 79–97)
MONOCYTES # BLD AUTO: 1.3 X10E3/UL (ref 0.1–0.9)
MONOCYTES NFR BLD AUTO: 9 %
NEUTROPHILS # BLD AUTO: 10.6 X10E3/UL (ref 1.4–7)
NEUTROPHILS NFR BLD AUTO: 72 %
PLATELET # BLD AUTO: 332 X10E3/UL (ref 150–379)
POTASSIUM SERPL-SCNC: 4.8 MMOL/L (ref 3.5–5.2)
PROT SERPL-MCNC: 7.3 G/DL (ref 6–8.5)
RBC # BLD AUTO: 4.29 X10E6/UL (ref 3.77–5.28)
SODIUM SERPL-SCNC: 140 MMOL/L (ref 134–144)
WBC # BLD AUTO: 14.6 X10E3/UL (ref 3.4–10.8)

## 2017-06-21 NOTE — TELEPHONE ENCOUNTER
Patient notified of abnormal chest x-ray results & needs a follow up CT to further evaluate the Nodule seen in her right lung. Patient voiced understanding.

## 2017-06-21 NOTE — TELEPHONE ENCOUNTER
----- Message from Kim Ng MD sent at 6/20/2017  6:11 PM EDT -----  Can you call her - they are recommending a CT of the chest to look at nodule closer - is she willing to do this? Maybe we can do at the same time as the MRI abdomen?  I have not ordered it yet

## 2017-06-28 ENCOUNTER — OFFICE VISIT (OUTPATIENT)
Dept: INTERNAL MEDICINE CLINIC | Age: 77
End: 2017-06-28

## 2017-06-28 ENCOUNTER — HOSPITAL ENCOUNTER (OUTPATIENT)
Dept: MRI IMAGING | Age: 77
Discharge: HOME | End: 2017-06-28
Attending: INTERNAL MEDICINE
Payer: MEDICARE

## 2017-06-28 ENCOUNTER — HOSPITAL ENCOUNTER (OUTPATIENT)
Dept: CT IMAGING | Age: 77
Discharge: HOME | End: 2017-06-28
Attending: INTERNAL MEDICINE
Payer: MEDICARE

## 2017-06-28 VITALS
HEIGHT: 65 IN | RESPIRATION RATE: 14 BRPM | HEART RATE: 68 BPM | WEIGHT: 120.4 LBS | OXYGEN SATURATION: 98 % | BODY MASS INDEX: 20.06 KG/M2 | TEMPERATURE: 97.7 F | SYSTOLIC BLOOD PRESSURE: 114 MMHG | DIASTOLIC BLOOD PRESSURE: 63 MMHG

## 2017-06-28 DIAGNOSIS — J47.9 BRONCHIECTASIS WITHOUT COMPLICATION (HCC): ICD-10-CM

## 2017-06-28 DIAGNOSIS — N95.0 POSTMENOPAUSAL BLEEDING: Primary | ICD-10-CM

## 2017-06-28 DIAGNOSIS — K76.9 LIVER LESION: ICD-10-CM

## 2017-06-28 DIAGNOSIS — R91.1 LUNG NODULE: Primary | ICD-10-CM

## 2017-06-28 DIAGNOSIS — I48.19 PERSISTENT ATRIAL FIBRILLATION (HCC): ICD-10-CM

## 2017-06-28 DIAGNOSIS — R91.1 ABNORMAL X-RAY OF LUNGS WITH SINGLE PULMONARY NODULE: ICD-10-CM

## 2017-06-28 PROCEDURE — 74183 MRI ABD W/O CNTR FLWD CNTR: CPT

## 2017-06-28 PROCEDURE — 71260 CT THORAX DX C+: CPT

## 2017-06-28 PROCEDURE — 74011636320 HC RX REV CODE- 636/320: Performed by: INTERNAL MEDICINE

## 2017-06-28 PROCEDURE — 74011250636 HC RX REV CODE- 250/636: Performed by: INTERNAL MEDICINE

## 2017-06-28 PROCEDURE — A9585 GADOBUTROL INJECTION: HCPCS | Performed by: INTERNAL MEDICINE

## 2017-06-28 PROCEDURE — 74011000258 HC RX REV CODE- 258: Performed by: INTERNAL MEDICINE

## 2017-06-28 RX ORDER — AMOXICILLIN AND CLAVULANATE POTASSIUM 875; 125 MG/1; MG/1
1 TABLET, FILM COATED ORAL EVERY 12 HOURS
Qty: 20 TAB | Refills: 0 | Status: SHIPPED | OUTPATIENT
Start: 2017-06-28 | End: 2017-07-14 | Stop reason: ALTCHOICE

## 2017-06-28 RX ORDER — SODIUM CHLORIDE 0.9 % (FLUSH) 0.9 %
10 SYRINGE (ML) INJECTION
Status: COMPLETED | OUTPATIENT
Start: 2017-06-28 | End: 2017-06-28

## 2017-06-28 RX ADMIN — IOPAMIDOL 100 ML: 612 INJECTION, SOLUTION INTRAVENOUS at 17:39

## 2017-06-28 RX ADMIN — SODIUM CHLORIDE 30 ML: 900 INJECTION, SOLUTION INTRAVENOUS at 17:00

## 2017-06-28 RX ADMIN — Medication 10 ML: at 17:41

## 2017-06-28 RX ADMIN — SODIUM CHLORIDE 100 ML: 900 INJECTION, SOLUTION INTRAVENOUS at 17:41

## 2017-06-28 RX ADMIN — GADOBUTROL 5 ML: 604.72 INJECTION INTRAVENOUS at 17:00

## 2017-06-28 NOTE — PROGRESS NOTES
HISTORY OF PRESENT ILLNESS  Kostas Humphreys is a 77 y.o. female. HPI   Patient has not gotten CT of chest for 2.6mm nodule in right middle lobe. Patient has history of bronchiectisis. The nodule is more apparent than in previous CTs. She is having it done today- she feels like her cough and mucus are a little better but worried it will come right back    She has liver lesions as well and she has a MRI scheduled today to look at them  Patient presents today for a follow up. Patient reports that she stopped coughing up mucus since Sunday. She notes she feels better. She inquires about an extension of antibiotics for an upcomming trip. Patient is taking Asprin for AFIB. No chest pain ro acute SOB more wheezing and cough    Review of Systems   All other systems reviewed and are negative. Physical Exam   Constitutional: She is oriented to person, place, and time. She appears well-developed and well-nourished. HENT:   Head: Normocephalic and atraumatic. Right Ear: External ear normal.   Left Ear: External ear normal.   Nose: Nose normal.   Mouth/Throat: Oropharynx is clear and moist.   Eyes: Conjunctivae and EOM are normal.   Neck: Normal range of motion. Neck supple. Carotid bruit is not present. No thyroid mass and no thyromegaly present. Cardiovascular: Normal rate, regular rhythm, S1 normal, S2 normal, normal heart sounds and intact distal pulses. Pulmonary/Chest: Effort normal and breath sounds normal.   Abdominal: Soft. Normal appearance and bowel sounds are normal. There is no hepatosplenomegaly. There is no tenderness. Musculoskeletal: Normal range of motion. Neurological: She is alert and oriented to person, place, and time. She has normal strength. No cranial nerve deficit or sensory deficit. Coordination normal.   Skin: Skin is warm, dry and intact. No abrasion and no rash noted. Psychiatric: She has a normal mood and affect.  Her behavior is normal. Judgment and thought content normal. Nursing note and vitals reviewed. ASSESSMENT and PLAN  Mary Thomason was seen today for follow-up. Diagnoses and all orders for this visit:    Lung nodule  Patient is getting CT today in lab. Nodule is chronic and suspect CT will not be abnormal.    Liver lesion  MRI today in lab. Bronchiectasis without complication ([facility] [address]  Patient travelling and extended prescription.  -     amoxicillin-clavulanate (AUGMENTIN) 875-125 mg per tablet; Take 1 Tab by mouth every twelve (12) hours. Persistent atrial fibrillation (HCC)   Stable. Continue on aspirin. lab results and schedule of future lab studies reviewed with patient  reviewed diet, exercise and weight control    Written by Gabby Hutchinson, as dictated by Danyell Byers MD.     Current diagnosis and concerns discussed with pt at length. Understands risks and benefits or current treatment plan and medications and accepts the treatment and medication with any possible risks. Pt asks appropriate questions which were answered. Pt instructed to call with any concerns or problems.

## 2017-06-29 ENCOUNTER — TELEPHONE (OUTPATIENT)
Dept: INTERNAL MEDICINE CLINIC | Age: 77
End: 2017-06-29

## 2017-06-29 NOTE — PROGRESS NOTES
Justin Jones- can you call and let her know the MRI looks ok of the abdomen but there is a cyst on kidney and  and her can discuss further what to do

## 2017-06-29 NOTE — PROGRESS NOTES
She is following up with you when she comes back from vacation- I am going to have Theodore call her and tell her it is normal except for a cyst in kidney that she may need to run more tests on but will leave that up to both of you since she recently had a CT of abd/pelvis

## 2017-06-29 NOTE — TELEPHONE ENCOUNTER
----- Message from Fredi Goetz MD sent at 6/29/2017  2:47 PM EDT -----  Mala Sera- can you call and let her know the MRI looks ok of the abdomen but there is a cyst on kidney and  and her can discuss further what to do

## 2017-07-05 NOTE — TELEPHONE ENCOUNTER
Pt returned call and MRI results were provided with recommended follow up with Dr. Dorie Castle. Pt has follow up scheduled for July 14.

## 2017-07-14 ENCOUNTER — OFFICE VISIT (OUTPATIENT)
Dept: INTERNAL MEDICINE CLINIC | Age: 77
End: 2017-07-14

## 2017-07-14 VITALS
OXYGEN SATURATION: 98 % | SYSTOLIC BLOOD PRESSURE: 123 MMHG | WEIGHT: 120.6 LBS | BODY MASS INDEX: 20.09 KG/M2 | HEIGHT: 65 IN | TEMPERATURE: 98 F | HEART RATE: 66 BPM | DIASTOLIC BLOOD PRESSURE: 70 MMHG | RESPIRATION RATE: 16 BRPM

## 2017-07-14 DIAGNOSIS — R93.89 ABNORMAL CHEST CT: ICD-10-CM

## 2017-07-14 DIAGNOSIS — N28.9 KIDNEY LESION, NATIVE, RIGHT: Primary | ICD-10-CM

## 2017-07-14 DIAGNOSIS — J47.9 BRONCHIECTASIS WITHOUT COMPLICATION (HCC): ICD-10-CM

## 2017-07-14 RX ORDER — AMOXICILLIN AND CLAVULANATE POTASSIUM 875; 125 MG/1; MG/1
1 TABLET, FILM COATED ORAL
COMMUNITY
Start: 2017-06-28 | End: 2017-10-03 | Stop reason: ALTCHOICE

## 2017-07-14 NOTE — Clinical Note
Can you notify dr Cunningham Fat office that we should postpone t he hysteroscopy until further pulmonary eval done thanks

## 2017-07-14 NOTE — PROGRESS NOTES
HISTORY OF PRESENT ILLNESS  Lacy Humphreys is a 77 y.o. female. ESME Castle was recently seen by Dr. Hadley Lau here, had a course of Augmentin, which she said cured her cough. She had several studies done, including an MRI which showed a right kidney lesion, favor complex cyst, also a CT of chest, which showed resolved right lower lobe nodule, but new bilateral pleural based and scattered nodules and micronodules and findings consistent with bronchiectasis, question for MAC. She was bleeding on Eliquis vaginally. She is off the Eliquis and is scheduled to have a hysteroscopy with Dr. Hank Warren on July 20th. She notes a few sweats when first coming off the Eliquis, but none since. No fevers, no unintentional weight loss. Currently no cough or chest pain. Interestingly, her father was a pulmonologist who performed bronchoscopies. We discussed in detail today that I think she needs further evaluation of the abnormal chest findings and possibly a bronch with washing. She is going to talk to doctors at [address] about this. We will also see if she can get in with pulmonary here. I suggested she postpone the hysteroscopy as she is not currently having heavy vaginal bleeding until the pulmonary evaluation is complete. Discussed she will also ultimately need urology evaluation of the renal lesion, but that this probably could wait until pulmonary is evaluated. Review of Systems   Constitutional: Positive for diaphoresis. Negative for chills, fever, malaise/fatigue and weight loss. Respiratory: Negative for cough, hemoptysis, sputum production, shortness of breath and wheezing. Physical Exam   Constitutional: She is oriented to person, place, and time. She appears well-developed and well-nourished. HENT:   Head: Normocephalic and atraumatic. Neck: Normal range of motion. Neck supple. Carotid bruit is not present. No thyromegaly present.    Cardiovascular: Normal rate, regular rhythm, S1 normal, S2 normal, normal heart sounds and intact distal pulses. No murmur heard. Pulmonary/Chest: Effort normal and breath sounds normal. No respiratory distress. She has no wheezes. She has no rales. Musculoskeletal: She exhibits no edema. Neurological: She is alert and oriented to person, place, and time. Psychiatric: She has a normal mood and affect. Her behavior is normal.   Nursing note and vitals reviewed. ASSESSMENT and PLAN  Virgil Molina was seen today for results and vaginal itching. Diagnoses and all orders for this visit:    Kidney lesion, native, right-needs to see urology    Abnormal chest CT-needs pulmonary eval for ?  Mac  Refer to pulmonary and hold on hysteroscopy until further evaluation    Bronchiectasis without complication ([address]

## 2017-07-14 NOTE — PROGRESS NOTES
Chief Complaint   Patient presents with    Results     MRI and CT results       1. Have you been to the ER, urgent care clinic since your last visit? Hospitalized since your last visit? No    2. Have you seen or consulted any other health care providers outside of the [address] [place] [address] since your last visit? Include any pap smears or colon screening. No      Health Maintenance Due   Topic Date Due    Pneumococcal 65+ High/Highest Risk (2 of 2 - PPSV23) 10/18/2011    GLAUCOMA SCREENING Q2Y  03/27/2017       The patient's Advanced Care Directive is on file and verified complete.

## 2017-07-17 ENCOUNTER — TELEPHONE (OUTPATIENT)
Dept: INTERNAL MEDICINE CLINIC | Age: 77
End: 2017-07-17

## 2017-07-17 NOTE — TELEPHONE ENCOUNTER
Pt called and states she got an appt at a pulmonary doctor at [address] [place] [address]. Dr. Cantrell Service Wednesday at 12:30.

## 2017-07-25 ENCOUNTER — TELEPHONE (OUTPATIENT)
Dept: INTERNAL MEDICINE CLINIC | Age: 77
End: 2017-07-25

## 2017-07-25 NOTE — TELEPHONE ENCOUNTER
Pt left a envelope for the doctor to review. I placed it in the doctor's mailbox at the .      Thank you

## 2017-09-15 DIAGNOSIS — I48.19 PERSISTENT ATRIAL FIBRILLATION (HCC): ICD-10-CM

## 2017-09-18 NOTE — TELEPHONE ENCOUNTER
Request for eliquis 2.5mg twice a day. Last office visit 10/4/16, next office visit 10/3/17. Refills per verbal order from Dr. Marion Adhikari.

## 2017-10-03 ENCOUNTER — OFFICE VISIT (OUTPATIENT)
Dept: CARDIOLOGY CLINIC | Age: 77
End: 2017-10-03

## 2017-10-03 VITALS
SYSTOLIC BLOOD PRESSURE: 110 MMHG | HEIGHT: 65 IN | WEIGHT: 122.2 LBS | BODY MASS INDEX: 20.36 KG/M2 | DIASTOLIC BLOOD PRESSURE: 80 MMHG

## 2017-10-03 DIAGNOSIS — Z91.89 STROKE RISK: ICD-10-CM

## 2017-10-03 DIAGNOSIS — E78.2 MIXED HYPERLIPIDEMIA: Chronic | ICD-10-CM

## 2017-10-03 DIAGNOSIS — I48.19 PERSISTENT ATRIAL FIBRILLATION (HCC): Primary | ICD-10-CM

## 2017-10-03 DIAGNOSIS — I49.3 PVC (PREMATURE VENTRICULAR CONTRACTION): ICD-10-CM

## 2017-10-03 DIAGNOSIS — I10 HYPERTENSION, ESSENTIAL, BENIGN: ICD-10-CM

## 2017-10-03 NOTE — PROGRESS NOTES
Chief Complaint   Patient presents with    Follow-up     6 month follow up Persistent atrial fibrillation     1. Have you been to the ER, urgent care clinic since your last visit? Hospitalized since your last visit? [facility] ER 5/22/2017.    2. Have you seen or consulted any other health care providers outside of the [address] [place] [address] since your last visit? Include any pap smears or colon screening. Patient stated went to [address] [place] [address] to see Dr. Conrado Catherine [facility]monary [facility] 7/19/2017. Patient Stated dr. Shona Estrada referred her to Dr. Dian Barnhart for upper endoscopy and colonoscopy. Patient stated she had test on 8/2/2017.

## 2017-10-03 NOTE — MR AVS SNAPSHOT
Visit Information Date & Time Provider Department Dept. Phone Encounter #  
 10/3/2017 10:20 AM Anirudh Nowak MD [facility] Treadwell [phone] [id number] Upcoming Health Maintenance Date Due Pneumococcal 65+ High/Highest Risk (2 of 2 - PPSV23) 10/18/2011 GLAUCOMA SCREENING Q2Y 3/27/2017 MEDICARE YEARLY EXAM 7/19/2017 INFLUENZA AGE 9 TO ADULT 8/1/2017 COLONOSCOPY 8/20/2019 DTaP/Tdap/Td series (2 - Td) 1/18/2022 Allergies as of 10/3/2017  Review Complete On: 10/3/2017 By: Mario Alberto Aguilar LPN Severity Noted Reaction Type Reactions Ciprofloxacin  10/01/2006    Hives Current Immunizations  Reviewed on 1/5/2015 Name Date Influenza Vaccine 10/15/2015, 1/5/2015 Influenza Vaccine Split 10/20/2011 12:54 PM  
 TDAP Vaccine 1/18/2012 ZZZ-RETIRED (DO NOT USE) Pneumococcal Vaccine (Unspecified Type) 10/18/2006 Zoster 7/1/2007 Not reviewed this visit Vitals BP Height(growth percentile) Weight(growth percentile) BMI OB Status Smoking Status 110/80 (BP 1 Location: Right arm, BP Patient Position: Sitting) 5' 5\" (1.651 m) 122 lb 3.2 oz (55.4 kg) 20.34 kg/m2 Postmenopausal Never Smoker Vitals History BMI and BSA Data Body Mass Index Body Surface Area  
 20.34 kg/m 2 1.59 m 2 Preferred Pharmacy Pharmacy Name Phone [address], [address] [phone] Your Updated Medication List  
  
   
This list is accurate as of: 10/3/17 11:32 AM.  Always use your most recent med list.  
  
  
  
  
 ALEVE 220 mg Cap Generic drug:  naproxen sodium Take  by mouth daily as needed. ALLBEE/C PO Take 1 Tab by mouth daily. ALPRAZolam 0.5 mg tablet Commonly known as:  XANAX  
TAKE 1 TABLET BY MOUTH NIGHTLY AS NEEDED FOR ANXIETY. apixaban 2.5 mg tablet Commonly known as:  ELIQUIS  
TAKE 1 TABLET BY MOUTH 2 TIMES A DAY. atorvastatin 10 mg tablet Commonly known as:  LIPITOR  
TAKE 1/2 TABLET BY MOUTH EVERY DAY. cholecalciferol 400 unit Tab tablet Commonly known as:  VITAMIN D3 Take 800 Units by mouth daily. clobetasol 0.05 % topical cream  
Commonly known as:  Christine Canner Apply  to affected area two (2) times a day. colestipol 1 gram tablet Commonly known as:  COLESTID Take 1 Tab by mouth two (2) times a day. dilTIAZem  mg ER capsule Commonly known as:  CARDIZEM CD  
TAKE 1 CAPSULE BY MOUTH EVERY DAY. metoprolol succinate 50 mg XL tablet Commonly known as:  TOPROL-XL  
TAKE 1 TABLET BY MOUTH DAILY. traMADol 50 mg tablet Commonly known as:  ULTRAM  
Take 1 Tab by mouth every six (6) hours as needed for Pain. Max Daily Amount: 200 mg.  
  
 triamcinolone acetonide 0.1 % ointment Commonly known as:  KENALOG Apply  to affected area two (2) times a day. use thin layer TYLENOL PM PO Take  by mouth as needed. ZyrTEC 10 mg tablet Generic drug:  cetirizine Take 10 mg by mouth as needed. Patient Instructions Schedule a holter. Follow up with Dr Kika Hutton and limited echo in 2 weeks. Please provide this summary of care documentation to your next provider. Your primary care clinician is listed as Bal Martin. If you have any questions after today's visit, please call [phone].

## 2017-10-03 NOTE — PROGRESS NOTES
Yolette Humphreys     1940       Carson Harry MD, [facility]  Date of Visit-10/3/2017   PCP is Ishaan Temple MD   [facility]  [facility]  HPI:  Day Wang is a 69 y.o. female   Ms. Humphreys had some minor bleeding, unclear if vaginal or colon. She went to the ER. Her Hgb  was actually quite normal.  As part of workup because of previous history of colon cancer, she underwent a CT of the chest that showed several abnormalities. She spoke to Dr. Monserrat Retana, eventually on her own decided to see Dr. Laura Campos at [facility], who then referred her to Dr. Simona Jensen for colonoscopy and endoscopy. The patient has had continued diarrhea. She says she has continued to have a procedure done, had some sort of benign polyps. Then was supposed to get surgery of some sort on her uterus, but that was held off. Since that time has had no more bleeding, not been on anticoagulants. Dr. Laura Campos attempted to refer her to Dr. Ese Thomas for EP ablation, which I'm not sure why this would be done in a patient who is not symptomatic from her atrial fibrillation. I'd be concerned that even in this kind of situation that a patient with persistent atrial fibrillation, even if they had an ablation at this age, would still be at high risk and need to be on anticoagulation. Nonetheless, patient is very confused about all the different doctors, going to [facility], getting some of her care here, it's all quite difficult she says for her, but if you ask about symptoms she has no chest pain, no shortness of breath, does not seem to feel any symptoms other than the weight loss, and then the previous blood in stool hasn't happened since May. She's not been on the blood thinner. [place] Screen was to do hysteroscopy    Assessment/Plan:       1. Persistent atrial fibrillation, I think unlikely to benefit from ablation in terms of being off blood thinner. NYHA class 1. Doesn't feel it.   Continue current meds, which should include restarting Apixaban at 2.5 mg b.i.d., and then using Cardizem and Metoprolol as below. 2. Stroke risk. Continue [address] if possible. 3. Hypertension, at goal.  4. PVCs, quiescent. 5. History of DVT. 6. Mixed dyslipidemia. 7. Lung abnormalities. See imaging study from [facility]. Following up with Dr. Tanisha Granda tomorrow. 8. Has had colonoscopy. I have no objection to proceeding with any GYN polyp removal that she needs. She would be an acceptable risk for that. I have no objection to the planned  surgery. Patient seems to be at a low risk for justo-operative severe adverse cardiac events. Impression:   1. Persistent atrial fibrillation ([address]    2. Hypertension, essential, benign    3. PVC (premature ventricular contraction)    4. Mixed hyperlipidemia    5. Stroke risk       Cardiac History:   MAGALI 9-18-13=EF 55-60, mild MR  CV 9-18-13-sucessful with 150 J one shock  ECHO 8-26-13=mild dilated RV, 2+ ZOË, 2+ MR,TR, mild pulm HTN  ECHO 3-= EF 65% ZOË,mild MR,TR  CATH 4-= normal cors, ef 60%     ROS-except as noted above. . A complete cardiac and respiratory are reviewed and negative except as above ; Resp-denies wheezing  or productive cough,. Const- No unusual weight loss or fever; Neuro-no recent seizure or CVA ; GI- No BRBPR, abdom pain, bloating ; - no  hematuria   see supplement sheet, initialed and to be scanned by staff  Past Medical History:   Diagnosis Date    Breast cancer ([address] 3/18/2009    XRT;      Colon cancer ([address]     DVT of leg (deep venous thrombosis) ([address] 3/18/2009    Left Leg; ? 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  She has never used smokeless tobacco. She reports that she drinks about 1.0 oz of alcohol per week  She reports that she does not use illicit drugs.     Exam and Labs:  /80 (BP 1 Location: Right arm, BP Patient Position: Sitting)  Ht 5' 5\" (1.651 m)  Wt 122 lb 3.2 oz (55.4 kg)  BMI 20.34 kg/v9Fuiscplwygrwug:  NAD, comfortable  Head: NC,AT. Eyes: No scleral icterus. Neck:  Neck supple. No JVD present. Throat: moist mucous membranes. Chest: Effort normal & normal respiratory excursion . Neurological: alert, conversant and oriented . Skin: Skin is not cold. No obvious systemic rash noted. Not diaphoretic. No erythema. Psychiatric:  Grossly normal mood and affect. Behavior appears normal. Extremities:  no clubbing or cyanosis. Abdomen: non distended    Lungs:breath sounds normal. No stridor. distress, wheezes or  Rales. Heart:   IRIR , normal S1, S2, no murmurs, rubs, clicks or gallops , PMI non displaced. Edema: Edema is none. Lab Results   Component Value Date/Time    Cholesterol, total 148 07/18/2016 10:20 AM    HDL Cholesterol 68 07/18/2016 10:20 AM    LDL, calculated 63 07/18/2016 10:20 AM    Triglyceride 83 07/18/2016 10:20 AM     Lab Results   Component Value Date/Time    Sodium 140 06/20/2017 04:00 PM    Potassium 4.8 06/20/2017 04:00 PM    Chloride 101 06/20/2017 04:00 PM    CO2 24 06/20/2017 04:00 PM    Anion gap 8 05/22/2017 09:31 PM    Glucose 115 06/20/2017 04:00 PM    BUN 20 06/20/2017 04:00 PM    Creatinine 0.85 06/20/2017 04:00 PM    BUN/Creatinine ratio 24 06/20/2017 04:00 PM    GFR est AA 78 06/20/2017 04:00 PM    GFR est non-AA 67 06/20/2017 04:00 PM    Calcium 9.8 06/20/2017 04:00 PM      Wt Readings from Last 3 Encounters:   10/03/17 122 lb 3.2 oz (55.4 kg)   07/14/17 120 lb 9.6 oz (54.7 kg)   06/28/17 120 lb 6.4 oz (54.6 kg)      BP Readings from Last 3 Encounters:   10/03/17 110/80   07/14/17 123/70   06/28/17 114/63      Current Outpatient Prescriptions   Medication Sig    atorvastatin (LIPITOR) 10 mg tablet TAKE 1/2 TABLET BY MOUTH EVERY DAY.     dilTIAZem CD (CARDIZEM CD) 240 mg ER capsule TAKE 1 CAPSULE BY MOUTH EVERY DAY.    metoprolol succinate (TOPROL-XL) 50 mg XL tablet TAKE 1 TABLET BY MOUTH DAILY.  ALPRAZolam (XANAX) 0.5 mg tablet TAKE 1 TABLET BY MOUTH NIGHTLY AS NEEDED FOR ANXIETY.  B COMPLEX WITH VITAMIN C (ALLBEE/C PO) Take 1 Tab by mouth daily.  apixaban (ELIQUIS) 2.5 mg tablet TAKE 1 TABLET BY MOUTH 2 TIMES A DAY.  traMADol (ULTRAM) 50 mg tablet Take 1 Tab by mouth every six (6) hours as needed for Pain. Max Daily Amount: 200 mg.  clobetasol (TEMOVATE) 0.05 % topical cream Apply  to affected area two (2) times a day.  colestipol (COLESTID) 1 gram tablet Take 1 Tab by mouth two (2) times a day.  ACETAMINOPHEN/DIPHENHYDRAMINE (TYLENOL PM PO) Take  by mouth as needed.  naproxen sodium (ALEVE) 220 mg cap Take  by mouth daily as needed.  cholecalciferol (VITAMIN D3) 400 unit tab tablet Take 800 Units by mouth daily.  triamcinolone acetonide (KENALOG) 0.1 % ointment Apply  to affected area two (2) times a day. use thin layer (Patient taking differently: Apply  to affected area as needed. use thin layer)    cetirizine (ZYRTEC) 10 mg tablet Take 10 mg by mouth as needed. No current facility-administered medications for this visit. Impression see above.

## 2017-10-23 ENCOUNTER — CLINICAL SUPPORT (OUTPATIENT)
Dept: CARDIOLOGY CLINIC | Age: 77
End: 2017-10-23

## 2017-10-23 DIAGNOSIS — I48.19 PERSISTENT ATRIAL FIBRILLATION (HCC): Primary | ICD-10-CM

## 2017-10-23 DIAGNOSIS — I10 HYPERTENSION, ESSENTIAL, BENIGN: ICD-10-CM

## 2017-10-31 ENCOUNTER — DOCUMENTATION ONLY (OUTPATIENT)
Dept: CARDIOLOGY CLINIC | Age: 77
End: 2017-10-31

## 2017-11-02 ENCOUNTER — OFFICE VISIT (OUTPATIENT)
Dept: CARDIOLOGY CLINIC | Age: 77
End: 2017-11-02

## 2017-11-02 ENCOUNTER — CLINICAL SUPPORT (OUTPATIENT)
Dept: CARDIOLOGY CLINIC | Age: 77
End: 2017-11-02

## 2017-11-02 VITALS
DIASTOLIC BLOOD PRESSURE: 70 MMHG | HEIGHT: 65 IN | SYSTOLIC BLOOD PRESSURE: 110 MMHG | RESPIRATION RATE: 16 BRPM | HEART RATE: 71 BPM | WEIGHT: 120 LBS | BODY MASS INDEX: 19.99 KG/M2 | OXYGEN SATURATION: 98 %

## 2017-11-02 DIAGNOSIS — I48.19 PERSISTENT ATRIAL FIBRILLATION (HCC): Primary | ICD-10-CM

## 2017-11-02 DIAGNOSIS — E78.2 MIXED HYPERLIPIDEMIA: Chronic | ICD-10-CM

## 2017-11-02 DIAGNOSIS — I10 HYPERTENSION, ESSENTIAL, BENIGN: ICD-10-CM

## 2017-11-02 DIAGNOSIS — I48.19 PERSISTENT ATRIAL FIBRILLATION (HCC): ICD-10-CM

## 2017-11-02 DIAGNOSIS — I49.3 PVC (PREMATURE VENTRICULAR CONTRACTION): ICD-10-CM

## 2017-11-02 NOTE — PROGRESS NOTES
Martin Humphreys     1940       Carson Cunningham MD, [facility] [place]  Date of Visit-11/2/2017   PCP is Joanne Lopez MD   [facility] [place]  [facility]  HPI:  Martin Humphreys is a 69 y.o. female   1 month f/u pt with AF that is persistent she is on eliquis 2.5 BID and using cardiazem and metoprolol for rate control additional she has a hx of HTN and DVT and since then holter was done showing AF with occasional RVR and limited ECHO is done today and shows normal LV function. Overall the pt states she is doing well. She states that she is feeling normal for herself. The pt is not having anymore bleeding issues. She is seeing a pulmonologist who would like to bronchoscope her in 2018. The pt does note that she is short of breath but she is able to do all of her daily activities. She was supposed to get surgery on a cyst but they did not do this as they wanted cardiac clearance.   + heart palpitations  occasionally non limiting  Denies chest pain, edema, syncope,     Assessment/Plan:     1. PersAF I don't think she will benefit from an ablation with long standing AF at 69years old and NYHA class I.   2. Continue rate control with Cardizem and metoprolol   3. Stroke risk on low dose Eliquis   Lab Results   Component Value Date/Time    Creatinine 0.85 06/20/2017 04:00 PM       4. No further cardiac testing is necessary I am comfortable with her having any surgeries or testing that she needs  5. Lipids on statin  Lab Results   Component Value Date/Time    LDL, calculated 63 07/18/2016 10:20 AM       I will see her back in 6 months. Future Appointments  Date Time Provider Corky Mann   5/3/2018 9:40 AM MD BRIGID Keyes SCHED         Impression:   1. Persistent atrial fibrillation ([address]    2. Hypertension, essential, benign    3. PVC (premature ventricular contraction)    4.  Mixed hyperlipidemia       Cardiac History:   MAGALI 9-18-13=EF 55-60, mild MR  CV 9-18-13-sucessful with 150 J one shock  ECHO 8-26-13=mild dilated RV, 2+ ZOË, 2+ MR,TR, mild pulm HTN  ECHO 3-= EF 65% ZOË,mild MR,TR  CATH 4-= normal cors, ef 60%     ROS-except as noted above. . A complete cardiac and respiratory are reviewed and negative except as above ; Resp-denies wheezing  or productive cough,. Const- No unusual weight loss or fever; Neuro-no recent seizure or CVA ; GI- No BRBPR, abdom pain, bloating ; - no  hematuria   see supplement sheet, initialed and to be scanned by staff  Past Medical History:   Diagnosis Date    Breast cancer ([facility] 3/18/2009    XRT;      Colon cancer ([facility]     DVT of leg (deep venous thrombosis) ([facility] 3/18/2009    Left Leg; ? Tamoxifen     Essential hypertension     Lung nodule 10/1/2009    Mixed hyperlipidemia 3/18/2009    Paroxysmal a-fib ([facility]     Rectal polyp 3/18/2009    -adenocarcinoma Stage 1       Social Hx= reports that she has never smoked. She has never used smokeless tobacco. She reports that she drinks about 1.0 oz of alcohol per week  She reports that she does not use illicit drugs. Exam and Labs:  /70 (BP 1 Location: Left arm, BP Patient Position: Sitting)  Pulse 71  Resp 16  Ht 5' 5\" (1.651 m)  Wt 120 lb (54.4 kg)  SpO2 98%  BMI 19.97 kg/v3Hceecpmcnddsec:  NAD, comfortable  Head: NC,AT. Eyes: No scleral icterus. Neck:  Neck supple. No JVD present. Throat: moist mucous membranes. Chest: Effort normal & normal respiratory excursion . Neurological: alert, conversant and oriented . Skin: Skin is not cold. No obvious systemic rash noted. Not diaphoretic. No erythema. Psychiatric:  Grossly normal mood and affect. Behavior appears normal. Extremities:  no clubbing or cyanosis. Abdomen: non distended    Lungs:breath sounds normal. No stridor. distress, wheezes or  Rales. Heart: Irregularly irregular rhythm, normal S1, S2, no murmurs, rubs, clicks or gallops , PMI non displaced. Edema: Edema is none.   Lab Results Component Value Date/Time    Cholesterol, total 148 07/18/2016 10:20 AM    HDL Cholesterol 68 07/18/2016 10:20 AM    LDL, calculated 63 07/18/2016 10:20 AM    Triglyceride 83 07/18/2016 10:20 AM     Lab Results   Component Value Date/Time    Sodium 140 06/20/2017 04:00 PM    Potassium 4.8 06/20/2017 04:00 PM    Chloride 101 06/20/2017 04:00 PM    CO2 24 06/20/2017 04:00 PM    Anion gap 8 05/22/2017 09:31 PM    Glucose 115 06/20/2017 04:00 PM    BUN 20 06/20/2017 04:00 PM    Creatinine 0.85 06/20/2017 04:00 PM    BUN/Creatinine ratio 24 06/20/2017 04:00 PM    GFR est AA 78 06/20/2017 04:00 PM    GFR est non-AA 67 06/20/2017 04:00 PM    Calcium 9.8 06/20/2017 04:00 PM      Wt Readings from Last 3 Encounters:   11/02/17 120 lb (54.4 kg)   10/03/17 122 lb 3.2 oz (55.4 kg)   07/14/17 120 lb 9.6 oz (54.7 kg)      BP Readings from Last 3 Encounters:   11/02/17 110/70   10/03/17 110/80   07/14/17 123/70      Current Outpatient Prescriptions   Medication Sig    apixaban (ELIQUIS) 2.5 mg tablet TAKE 1 TABLET BY MOUTH 2 TIMES A DAY.  atorvastatin (LIPITOR) 10 mg tablet TAKE 1/2 TABLET BY MOUTH EVERY DAY.  dilTIAZem CD (CARDIZEM CD) 240 mg ER capsule TAKE 1 CAPSULE BY MOUTH EVERY DAY.  metoprolol succinate (TOPROL-XL) 50 mg XL tablet TAKE 1 TABLET BY MOUTH DAILY.  ALPRAZolam (XANAX) 0.5 mg tablet TAKE 1 TABLET BY MOUTH NIGHTLY AS NEEDED FOR ANXIETY.  cholecalciferol (VITAMIN D3) 400 unit tab tablet Take 800 Units by mouth daily.  cetirizine (ZYRTEC) 10 mg tablet Take 10 mg by mouth as needed.  B COMPLEX WITH VITAMIN C (ALLBEE/C PO) Take 1 Tab by mouth daily.  traMADol (ULTRAM) 50 mg tablet Take 1 Tab by mouth every six (6) hours as needed for Pain. Max Daily Amount: 200 mg.  clobetasol (TEMOVATE) 0.05 % topical cream Apply  to affected area two (2) times a day.  colestipol (COLESTID) 1 gram tablet Take 1 Tab by mouth two (2) times a day.     ACETAMINOPHEN/DIPHENHYDRAMINE (TYLENOL PM PO) Take  by mouth as needed.  naproxen sodium (ALEVE) 220 mg cap Take  by mouth daily as needed.  triamcinolone acetonide (KENALOG) 0.1 % ointment Apply  to affected area two (2) times a day. use thin layer (Patient taking differently: Apply  to affected area as needed. use thin layer)     No current facility-administered medications for this visit. Impression see above.       Written by Kindra Augustine, as dictated by Madhu Dailey MD.

## 2017-11-02 NOTE — MR AVS SNAPSHOT
Visit Information Date & Time Provider Department Dept. Phone Encounter #  
 11/2/2017  1:40 PM Monae Lyle MD [facility] [phone] [id number] Upcoming Health Maintenance Date Due Pneumococcal 65+ High/Highest Risk (2 of 2 - PPSV23) 10/18/2011 GLAUCOMA SCREENING Q2Y 3/27/2017 MEDICARE YEARLY EXAM 7/19/2017 INFLUENZA AGE 9 TO ADULT 8/1/2017 COLONOSCOPY 8/20/2019 DTaP/Tdap/Td series (2 - Td) 1/18/2022 Allergies as of 11/2/2017  Review Complete On: 11/2/2017 By: Can Medel Severity Noted Reaction Type Reactions Ciprofloxacin  10/01/2006    Hives Current Immunizations  Reviewed on 1/5/2015 Name Date Influenza Vaccine 10/15/2015, 1/5/2015 Influenza Vaccine Split 10/20/2011 12:54 PM  
 TDAP Vaccine 1/18/2012 ZZZ-RETIRED (DO NOT USE) Pneumococcal Vaccine (Unspecified Type) 10/18/2006 Zoster 7/1/2007 Not reviewed this visit You Were Diagnosed With   
  
 Codes Comments PVC (premature ventricular contraction)    -  Primary ICD-10-CM: I49.3 ICD-9-CM: 427.69 Vitals BP Pulse Resp Height(growth percentile) Weight(growth percentile) SpO2  
 110/70 (BP 1 Location: Left arm, BP Patient Position: Sitting) 71 16 5' 5\" (1.651 m) 120 lb (54.4 kg) 98% BMI OB Status Smoking Status 19.97 kg/m2 Postmenopausal Never Smoker Vitals History BMI and BSA Data Body Mass Index Body Surface Area  
 19.97 kg/m 2 1.58 m 2 Preferred Pharmacy Pharmacy Name Phone [address], [address] [facility] [phone] Your Updated Medication List  
  
   
This list is accurate as of: 11/2/17  2:12 PM.  Always use your most recent med list.  
  
  
  
  
 ALEVE 220 mg Cap Generic drug:  naproxen sodium Take  by mouth daily as needed. ALLBEE/C PO Take 1 Tab by mouth daily. ALPRAZolam 0.5 mg tablet Commonly known as:  [place] TAKE 1 TABLET BY MOUTH NIGHTLY AS NEEDED FOR ANXIETY. apixaban 2.5 mg tablet Commonly known as:  ELIQUIS  
TAKE 1 TABLET BY MOUTH 2 TIMES A DAY. atorvastatin 10 mg tablet Commonly known as:  LIPITOR  
TAKE 1/2 TABLET BY MOUTH EVERY DAY. cholecalciferol 400 unit Tab tablet Commonly known as:  VITAMIN D3 Take 800 Units by mouth daily. clobetasol 0.05 % topical cream  
Commonly known as:  Luis Alberto Pipes Apply  to affected area two (2) times a day. colestipol 1 gram tablet Commonly known as:  COLESTID Take 1 Tab by mouth two (2) times a day. dilTIAZem  mg ER capsule Commonly known as:  CARDIZEM CD  
TAKE 1 CAPSULE BY MOUTH EVERY DAY. metoprolol succinate 50 mg XL tablet Commonly known as:  TOPROL-XL  
TAKE 1 TABLET BY MOUTH DAILY. traMADol 50 mg tablet Commonly known as:  ULTRAM  
Take 1 Tab by mouth every six (6) hours as needed for Pain. Max Daily Amount: 200 mg.  
  
 triamcinolone acetonide 0.1 % ointment Commonly known as:  KENALOG Apply  to affected area two (2) times a day. use thin layer TYLENOL PM PO Take  by mouth as needed. ZyrTEC 10 mg tablet Generic drug:  cetirizine Take 10 mg by mouth as needed. We Performed the Following AMB POC EKG ROUTINE W/ 12 LEADS, INTER & REP [79944 CPT(R)] Patient Instructions Follow up with Dr Susanne Herrmann in 6 months. Please provide this summary of care documentation to your next provider. Your primary care clinician is listed as Bal Martin. If you have any questions after today's visit, please call [phone].

## 2018-03-03 DIAGNOSIS — I48.19 PERSISTENT ATRIAL FIBRILLATION (HCC): ICD-10-CM

## 2018-03-05 RX ORDER — APIXABAN 2.5 MG/1
TABLET, FILM COATED ORAL
Qty: 60 TAB | Refills: 5 | Status: SHIPPED | OUTPATIENT
Start: 2018-03-05 | End: 2018-09-10 | Stop reason: SDUPTHER

## 2018-03-05 NOTE — TELEPHONE ENCOUNTER
Request for eliquis 2.5 mg tab, BID. Last office visit 10/13/17,   Next office visit 5/3/18.      Refills per verbal order from Dr. Ham Valdez.

## 2018-03-20 RX ORDER — DILTIAZEM HYDROCHLORIDE 240 MG/1
CAPSULE, COATED, EXTENDED RELEASE ORAL
Qty: 90 CAP | Refills: 1 | Status: SHIPPED | OUTPATIENT
Start: 2018-03-20 | End: 2018-06-04

## 2018-03-20 NOTE — TELEPHONE ENCOUNTER
Request for diltiazem  mg ER cap, daily. Last office visit 11/2/17,   Next office visit 5/3/18.      Refills per verbal order from Dr. Shiloh Seay.

## 2018-06-04 ENCOUNTER — OFFICE VISIT (OUTPATIENT)
Dept: CARDIOLOGY CLINIC | Age: 78
End: 2018-06-04

## 2018-06-04 VITALS
BODY MASS INDEX: 19.76 KG/M2 | SYSTOLIC BLOOD PRESSURE: 160 MMHG | WEIGHT: 118.6 LBS | HEIGHT: 65 IN | DIASTOLIC BLOOD PRESSURE: 90 MMHG | HEART RATE: 74 BPM | RESPIRATION RATE: 16 BRPM

## 2018-06-04 DIAGNOSIS — Z91.89 STROKE RISK: ICD-10-CM

## 2018-06-04 DIAGNOSIS — E78.2 MIXED HYPERLIPIDEMIA: Chronic | ICD-10-CM

## 2018-06-04 DIAGNOSIS — R91.1 LUNG NODULE: ICD-10-CM

## 2018-06-04 DIAGNOSIS — C50.919 MALIGNANT NEOPLASM OF FEMALE BREAST, UNSPECIFIED ESTROGEN RECEPTOR STATUS, UNSPECIFIED LATERALITY, UNSPECIFIED SITE OF BREAST (HCC): ICD-10-CM

## 2018-06-04 DIAGNOSIS — I49.3 PVC (PREMATURE VENTRICULAR CONTRACTION): ICD-10-CM

## 2018-06-04 DIAGNOSIS — I10 HYPERTENSION, ESSENTIAL, BENIGN: ICD-10-CM

## 2018-06-04 DIAGNOSIS — I48.19 PERSISTENT ATRIAL FIBRILLATION (HCC): Primary | ICD-10-CM

## 2018-06-04 DIAGNOSIS — A31.9 ATYPICAL MYCOBACTERIAL DISEASE: ICD-10-CM

## 2018-06-04 RX ORDER — DOFETILIDE 0.25 MG/1
250 CAPSULE ORAL 2 TIMES DAILY
COMMUNITY
Start: 2018-04-27 | End: 2022-05-11

## 2018-06-04 RX ORDER — ACETAMINOPHEN 500 MG
TABLET ORAL AS NEEDED
COMMUNITY

## 2018-06-04 NOTE — MR AVS SNAPSHOT
[address] [facility] [facility] 
[phone] Patient: Elly Humphreys MRN: [id number] HYP:[id number] Visit Information Date & Time Provider Department Dept. Phone Encounter #  
 6/4/2018 10:20 AM Zi Ballesteros MD [facility] [phone] [id number] Upcoming Health Maintenance Date Due Pneumococcal 65+ High/Highest Risk (2 of 2 - PPSV23) 10/18/2011 GLAUCOMA SCREENING Q2Y 3/27/2017 MEDICARE YEARLY EXAM 3/14/2018 Influenza Age 5 to Adult 8/1/2018 COLONOSCOPY 8/20/2019 DTaP/Tdap/Td series (2 - Td) 1/18/2022 Allergies as of 6/4/2018  Review Complete On: 6/4/2018 By: Zi Ballesteros MD  
  
 Severity Noted Reaction Type Reactions Ciprofloxacin  10/01/2006    Hives Current Immunizations  Reviewed on 1/5/2015 Name Date Influenza Vaccine 10/15/2015, 1/5/2015 Influenza Vaccine Split 10/20/2011 12:54 PM  
 TDAP Vaccine 1/18/2012 ZZZ-RETIRED (DO NOT USE) Pneumococcal Vaccine (Unspecified Type) 10/18/2006 Zoster 7/1/2007 Not reviewed this visit You Were Diagnosed With   
  
 Codes Comments Persistent atrial fibrillation (HCC)    -  Primary ICD-10-CM: I48.1 ICD-9-CM: 427.31 Hypertension, essential, benign     ICD-10-CM: I10 
ICD-9-CM: 401.1 PVC (premature ventricular contraction)     ICD-10-CM: I49.3 ICD-9-CM: 427.69 Stroke risk     ICD-10-CM: Z91.89 ICD-9-CM: V15.89 Vitals BP Pulse Resp Height(growth percentile) Weight(growth percentile) BMI  
 160/90 (BP 1 Location: Left arm, BP Patient Position: Sitting) 74 16 5' 5\" (1.651 m) 118 lb 9.6 oz (53.8 kg) 19.74 kg/m2 OB Status Smoking Status Postmenopausal Never Smoker Vitals History BMI and BSA Data Body Mass Index Body Surface Area 19.74 kg/m 2 1.57 m 2 Preferred Pharmacy Pharmacy Name Phone [address], [address] [facility] [phone] Your Updated Medication List  
  
   
This list is accurate as of 6/4/18 11:29 AM.  Always use your most recent med list.  
  
  
  
  
 ALLBEE/C PO Take 1 Tab by mouth daily. atorvastatin 10 mg tablet Commonly known as:  LIPITOR  
TAKE 1/2 TABLET BY MOUTH EVERY DAY. dofetilide 500 mcg capsule Commonly known as:  Justin Mccullough Take 500 mcg by mouth two (2) times a day. ELIQUIS 2.5 mg tablet Generic drug:  apixaban TAKE 1 TABLET BY MOUTH 2 TIMES A DAY. metoprolol succinate 50 mg XL tablet Commonly known as:  TOPROL-XL  
TAKE 1 TABLET BY MOUTH DAILY. TYLENOL EXTRA STRENGTH 500 mg tablet Generic drug:  acetaminophen Take  by mouth as needed for Pain. We Performed the Following AMB POC EKG ROUTINE W/ 12 LEADS, INTER & REP [35965 CPT(R)] Patient Instructions You will need to follow up in clinic with Dr. Cecille Betancourt in January Introducing [facility]! Dear Madisyn Sow: Thank you for requesting a [employer] account. Our records indicate that you have previously registered for a [employer] account but its currently inactive. Please call our [employer] support line at [phone]. Additional Information If you have questions, please visit the Frequently Asked Questions section of the [employer] website at [email or web address] Remember, [employer] is NOT to be used for urgent needs. For medical emergencies, dial 911. Now available from your iPhone and Android! Please provide this summary of care documentation to your next provider. Your primary care clinician is listed as Bal Martin. If you have any questions after today's visit, please call [phone].

## 2018-06-04 NOTE — PROGRESS NOTES
Silvia Humphreys     1940       Carson Patterson MD, [facility] [place]  Date of Visit-6/4/2018   PCP is Katarina Condon MD   [facility] [place]  [facility]  HPI:  Silvia Humphreys is a 69 y.o. female   Subjective:  Ms. Hali Bautista comes to see us. She has had an interesting story of a lot of different doctors and institutions and a lot of fractured care I believe. I had seen her for atrial fibrillation for several years, we did cardioversion, lasted a while, she went out of rhythm, wasn't particularly symptomatic, was really more suffering from her 's passing away, and she was maintained on Eliquis. She was on a lower dose of Eliquis because she'd had some GYN bleeding previously. She saw Dr. Jordan Graf of pulmonary at [address] [place] [address] noting treatment for atypical mycobacterium infection based on CT criteria with no specific therapy. Dr. Jordan Graf recommended that she return to sinus rhythm and referred her to Dr. Vahid Ojeda at [address] [place] [address]. The patient was seen, had minimal symptoms, but it was decided to do a cardioversion with Dofetilide. The cardioversion was done on April 2nd. A MAGALI was done because of lower dose of Eliquis. The patient converted to sinus rhythm. In the meantime the patient had been seen at [facility], Dr. Bradford Huffman, in March, having a previous history of breast cancer and adenocarcinoma. She was seen by GYN 05/24/18 with a histography, polypectomy and D&C done at that time as an outpatient. She is to get a repeat CT scan in June. Some of these records are available in the chart, as have been requested or sent to Dr. Be Jackson, and most of this is from the patient. Reading the [address] [facility] thoracic surgeon note, it notes that it is pulmonary nodules that were there before and there is a repeat CT scan planned as a three month follow up.     The patient herself, while she is able to give excellent history and details, sort of feels like she doesn't really have a handle on the overlying picture and doesn't necessarily seem to buy into what everybody is doing. It is also clear that a lot of this is problematic because it is at three different institutions and five to six different types of physicians. From a cardiac point of view, since she's been on sinus rhythm on Dofetilide she doesn't feel any better. She has shortness of breath, which she thinks may actually be from the Dofetilide. She talked about it to Dr. Mahamed North recently and Dr. Mahamed North told her she could stop it if she wanted or she could continue it. Patient is not sure what to do. She has no chest pain, palpitations, dizziness, tachycardia, slow heartbeats, weight loss, abdominal pain, fever, blood in the urine. Her EKG today shows sinus rhythm, WNL at a rate of 87. Assessment/Plan:       1. Persistent atrial fibrillation. Had tried a previous cardioversion, now put on Dofetilide and having repeat cardioversion 04/02/18. Had minimal symptoms before. Doesn't seem to have a lot of symptoms now, continues to be short of breath. That shortness of breath could be from the atypical mycobacterium, but it doesn't seem particularly impressive reading through Dr. Kit Rose notes, as for symptoms he's not particularly treating at this time. The patient has had CT scans with incidental pulmonary nodules, which I don't think are necessarily related to any of the shortness of breath, and she is actually very functional, I would call her NYHA 1. I don't feel like the Dofetilide is necessary, but since she is seeing an electrophysiologist I will defer to Dr. Mahamed North and her decision. What I suggest is she might want to wait and see what is going to happen on June 29th when she goes to [facility] and see what they want to do in terms of any kind of surgery or other.   A patient who is minimally symptomatic and especially asymptomatic atrial fibrillation is not particularly better off in sinus rhythm based on data and clinical experience, as long as they are protected from stroke. Certainly symptomatic patients may benefit from ablation or aggressive medical therapy. I had talked to her at last visit that ablation would not be a suggested  choice for her in my opinion at 69 and NYHA class 1. However I would welcome EP input. 2. The main thing is to prevent the risk of stroke. The lower dose of Eliquis seems to be an appropriate balance between bleeding risk and stroke risk. 3. Now off of some of the rate control medicines since on Dofetilide, just on a little bit of Metoprolol and off Diltiazem. 4. Pulmonary nodules, followed at [facility]. History of cancer in the colon, adenocarcinoma, breast cancer. These nodules will get a follow up CT, scheduled 06/29/18. The patient is confused as to whether these are cancer or what has to be done. 5. GYN, previous bleeding, history of polypectomy 05/24/18, D&C.  6. Dyslipidemia, on Atorvastatin. 7. I will try to get notes to as many of these physicians as possible to try to ease the fracture of care. Ms. Max Jensen is a breanna lady who I think is a little unsure of what the big picture is here, but is dutifully going to all those she is asked to go to. Future Appointments  Date Time Provider Corky Mann   1/7/2019 10:20 AM Ruddy Walton  [address] [facility] CAD CHF Meds             dofetilide (TIKOSYN) 500 mcg capsule  (Taking) Take 500 mcg by mouth two (2) times a day. ELIQUIS 2.5 mg tablet  (Taking) TAKE 1 TABLET BY MOUTH 2 TIMES A DAY. atorvastatin (LIPITOR) 10 mg tablet  (Taking) TAKE 1/2 TABLET BY MOUTH EVERY DAY. metoprolol succinate (TOPROL-XL) 50 mg XL tablet  (Taking) TAKE 1 TABLET BY MOUTH DAILY. Impression:   1. Persistent atrial fibrillation ([address]    2. Hypertension, essential, benign    3. PVC (premature ventricular contraction)    4. Stroke risk    5. Mixed hyperlipidemia    6. Lung nodule    7.  Malignant neoplasm of female breast, unspecified estrogen receptor status, unspecified laterality, unspecified site of breast ([facility]    8. Atypical mycobacterial disease       Cardiac History:   MAGALI 9-18-13=EF 55-60, mild MR  CV 9-18-13-sucessful with 150 J one shock  ECHO 8-26-13=mild dilated RV, 2+ ZOË, 2+ MR,TR, mild pulm HTN  ECHO 3-= EF 65% ZOË,mild MR,TR  CATH 4-= normal cors, ef 60%     ROS-except as noted above. . A complete cardiac and respiratory are reviewed and negative except as above ; Resp-denies wheezing  or productive cough,. Const- No unusual weight loss or fever; Neuro-no recent seizure or CVA ; GI- No BRBPR, abdom pain, bloating ; - no  hematuria   see supplement sheet, initialed and to be scanned by staff  Past Medical History:   Diagnosis Date    Breast cancer ([facility] 3/18/2009    XRT;      [facility]     DVT of leg (deep venous thrombosis) ([facility] 3/18/2009    Left Leg; ? Tamoxifen     Essential hypertension     Lung nodule 10/1/2009    Mixed hyperlipidemia 3/18/2009    Paroxysmal A-fib ([facility]     Rectal polyp 3/18/2009    -adenocarcinoma Stage 1       Social Hx= reports that she has never smoked. She has never used smokeless tobacco. She reports that she drinks about 1.0 oz of alcohol per week  She reports that she does not use illicit drugs. Exam and Labs:  /90 (BP 1 Location: Left arm, BP Patient Position: Sitting)  Pulse 74  Resp 16  Ht 5' 5\" (1.651 m)  Wt 118 lb 9.6 oz (53.8 kg)  BMI 19.74 kg/n7Hdmixbjcicgykg:  NAD, comfortable  Head: NC,AT. Eyes: No scleral icterus. Neck:  Neck supple. No JVD present. Throat: moist mucous membranes. Chest: Effort normal & normal respiratory excursion . Neurological: alert, conversant and oriented . Skin: Skin is not cold. No obvious systemic rash noted. Not diaphoretic. No erythema. Psychiatric:  Grossly normal mood and affect. Behavior appears normal. Extremities:  no clubbing or cyanosis. Abdomen: non distended    Lungs:breath sounds normal. No stridor.  distress, wheezes or Rales.  Heart:RRR  normal S1, S2, no murmurs, rubs, clicks or gallops , PMI non displaced. Edema: Edema is none. Wt Readings from Last 3 Encounters:   06/04/18 118 lb 9.6 oz (53.8 kg)   11/02/17 120 lb (54.4 kg)   10/03/17 122 lb 3.2 oz (55.4 kg)      BP Readings from Last 3 Encounters:   06/04/18 160/90   11/02/17 110/70   10/03/17 110/80      Current Outpatient Prescriptions   Medication Sig    acetaminophen (TYLENOL EXTRA STRENGTH) 500 mg tablet Take  by mouth as needed for Pain.  dofetilide (TIKOSYN) 500 mcg capsule Take 500 mcg by mouth two (2) times a day.  ELIQUIS 2.5 mg tablet TAKE 1 TABLET BY MOUTH 2 TIMES A DAY.  atorvastatin (LIPITOR) 10 mg tablet TAKE 1/2 TABLET BY MOUTH EVERY DAY.  metoprolol succinate (TOPROL-XL) 50 mg XL tablet TAKE 1 TABLET BY MOUTH DAILY.  B COMPLEX WITH VITAMIN C (ALLBEE/C PO) Take 1 Tab by mouth daily.  dilTIAZem CD (CARDIZEM CD) 240 mg ER capsule TAKE 1 CAPSULE BY MOUTH EVERY DAY.  traMADol (ULTRAM) 50 mg tablet Take 1 Tab by mouth every six (6) hours as needed for Pain. Max Daily Amount: 200 mg.  clobetasol (TEMOVATE) 0.05 % topical cream Apply  to affected area two (2) times a day.  colestipol (COLESTID) 1 gram tablet Take 1 Tab by mouth two (2) times a day.  ALPRAZolam (XANAX) 0.5 mg tablet TAKE 1 TABLET BY MOUTH NIGHTLY AS NEEDED FOR ANXIETY.  ACETAMINOPHEN/DIPHENHYDRAMINE (TYLENOL PM PO) Take  by mouth as needed.  naproxen sodium (ALEVE) 220 mg cap Take  by mouth daily as needed.  cholecalciferol (VITAMIN D3) 400 unit tab tablet Take 800 Units by mouth daily.  triamcinolone acetonide (KENALOG) 0.1 % ointment Apply  to affected area two (2) times a day. use thin layer (Patient taking differently: Apply  to affected area as needed. use thin layer)    cetirizine (ZYRTEC) 10 mg tablet Take 10 mg by mouth as needed. No current facility-administered medications for this visit. Impression see above.

## 2018-06-26 RX ORDER — METOPROLOL SUCCINATE 25 MG/1
TABLET, EXTENDED RELEASE ORAL
Qty: 15 TAB | Refills: 0 | OUTPATIENT
Start: 2018-06-26

## 2018-09-24 RX ORDER — ATORVASTATIN CALCIUM 10 MG/1
TABLET, FILM COATED ORAL
Qty: 45 TAB | Refills: 1 | Status: SHIPPED | OUTPATIENT
Start: 2018-09-24 | End: 2019-03-26 | Stop reason: SDUPTHER

## 2018-09-24 NOTE — TELEPHONE ENCOUNTER
Requested Prescriptions     Signed Prescriptions Disp Refills    atorvastatin (LIPITOR) 10 mg tablet 45 Tab 1     Sig: TAKE 1/2 TABLET BY MOUTH EVERY DAY. Authorizing Provider: Last Padilla     Ordering User: Adrianne Centeno     Verbal order per EDUARDO Campbell. Follow up with Dr. Abigail Estes scheduled on 1-7-19.

## 2019-02-04 ENCOUNTER — OFFICE VISIT (OUTPATIENT)
Dept: CARDIOLOGY CLINIC | Age: 79
End: 2019-02-04

## 2019-02-04 VITALS
WEIGHT: 122.4 LBS | HEIGHT: 65 IN | RESPIRATION RATE: 18 BRPM | SYSTOLIC BLOOD PRESSURE: 144 MMHG | HEART RATE: 81 BPM | OXYGEN SATURATION: 98 % | DIASTOLIC BLOOD PRESSURE: 82 MMHG | BODY MASS INDEX: 20.39 KG/M2

## 2019-02-04 DIAGNOSIS — I10 HYPERTENSION, ESSENTIAL, BENIGN: ICD-10-CM

## 2019-02-04 DIAGNOSIS — I49.3 PVC (PREMATURE VENTRICULAR CONTRACTION): ICD-10-CM

## 2019-02-04 DIAGNOSIS — I48.19 PERSISTENT ATRIAL FIBRILLATION (HCC): Primary | ICD-10-CM

## 2019-02-04 DIAGNOSIS — E78.2 MIXED HYPERLIPIDEMIA: Chronic | ICD-10-CM

## 2019-02-04 RX ORDER — LISINOPRIL 10 MG/1
20 TABLET ORAL DAILY
COMMUNITY
End: 2019-11-14 | Stop reason: DRUGHIGH

## 2019-02-04 NOTE — PROGRESS NOTES
Gladis Humphreys     1940       Carson Castellano MD, [facility] Date of Visit-2/4/2019 PCP is Luis Abraham MD  
[facility] HPI:  Gladis Humphreys is a 69 y.o. female Subjective:  
F/u  atrial fibrillation had  cardioversion, not  symptomatic. She was on a lower dose of Eliquis because she'd had some GYN bleeding previously. She saw Dr. Margie Nino of pulmonary at [facility] noting treatment for atypical mycobacterium infection based on CT criteria with no specific therapy. Dr. Margie Nino recommended that she return to sinus rhythm and referred her to Dr. Senia Hamilton at [facility]. The patient was seen, had minimal symptoms, but it was decided to do a cardioversion with Dofetilide. The cardioversion was done on April 2nd. A MAGALI was done because of lower dose of Eliquis. The patient converted to sinus rhythm. In the meantime the patient had been seen at [facility], Dr. Helio Motta, in March, having a previous history of breast cancer and adenocarcinoma. She was seen by GYN 05/24/18 with a histography, polypectomy and D&C done at that time as an outpatient. She is to get a repeat CT scan in June. Reading the [address] thoracic surgeon note, it notes that it is pulmonary nodules that were there before and there is a repeat CT scan planned as a three month follow up. From a cardiac point of view, since she's been on sinus rhythm on Dofetilide she doesn't feel any better. Since last visit has scheduled MRI for Thoracic surgery at [address] every 6 months Saw Dr Senia Hamilton EP [facility] Dec 2018 with ACE added for high BP Pt feels very well, may be a bit stoic , has no complaints Denies chest pain, edema, syncope or shortness of breath at rest  
Has no tachycardia , palpitations or sense of arrythmia Assessment/Plan: 1.  Persistent atrial fibrillation.  
-----prevent the risk of stroke- lower dose of Eliquis seems to be an appropriate balance between bleeding risk and stroke risk. 
----on Dofetilide ,repeat cardioversion 04/02/18. 2. Possible  atypical mycobacterium 3. HTN -- bp at goal for age 4. PVCs now asymptomatic 5. Pulmonary nodules, followed at [facility]. History of cancer in the colon, adenocarcinoma, breast cancer. 6. Dyslipidemia, on Atorvastatin. Future Appointments Date Time Provider Corky Mackenzie 2/4/2020  9:00 AM Carson Isabel  [address] [facility] fu one year Key CAD CHF Meds   
    
  
 lisinopril (PRINIVIL, ZESTRIL) 10 mg tablet  (Taking) Take 10 mg by mouth daily. atorvastatin (LIPITOR) 10 mg tablet  (Taking) TAKE 1/2 TABLET BY MOUTH EVERY DAY. ELIQUIS 2.5 mg tablet  (Taking) TAKE 1(ONE) TABLET BY MOUTH 2 TIMES A DAY. dofetilide (TIKOSYN) 500 mcg capsule  (Taking) Take 500 mcg by mouth two (2) times a day. metoprolol succinate (TOPROL-XL) 50 mg XL tablet  (Taking) TAKE 1 TABLET BY MOUTH DAILY. Impression: 1. Persistent atrial fibrillation ([address] 2. Hypertension, essential, benign 3. PVC (premature ventricular contraction) 4. Mixed hyperlipidemia Cardiac History:  
MAGALI 9-18-13=EF 55-60, mild MR 
CV 9-18-13-sucessful with 150 J one shock ECHO 8-26-13=mild dilated RV, 2+ ZOË, 2+ MR,TR, mild pulm HTN 
ECHO 3-= EF 65% ZOË,mild MR,TR 
CATH 4-= normal cors, ef 60% ROS-except as noted above. . A complete cardiac and respiratory are reviewed and negative except as above ; Resp-denies wheezing  or productive cough,. Const- No unusual weight loss or fever; Neuro-no recent seizure or CVA ; GI- No BRBPR, abdom pain, bloating ; - no  hematuria  
see supplement sheet, initialed and to be scanned by staff Past Medical History:  
Diagnosis Date  Atypical mycobacterial disease 6/4/2018  Breast cancer ([facility] 3/18/2009 XRT;    
 Colon cancer ([address]  DVT of leg (deep venous thrombosis) ([facility] 3/18/2009 Left Leg; ? Tamoxifen  Essential hypertension  Lung nodule 10/1/2009  Mixed hyperlipidemia 3/18/2009  Paroxysmal A-fib ([address]  Rectal polyp 3/18/2009  
 -adenocarcinoma Stage 1 Social Hx= reports that  has never smoked. she has never used smokeless tobacco. She reports that she drinks about 1.0 oz of alcohol per week. She reports that she does not use drugs. Exam and Labs:  /82 (BP 1 Location: Right arm, BP Patient Position: Sitting)   Pulse 81   Resp 18   Ht 5' 5\" (1.651 m)   Wt 122 lb 6.4 oz (55.5 kg)   SpO2 98%   BMI 20.37 kg/m² Constitutional:  NAD, comfortable Head: NC,AT. Eyes: No scleral icterus. Neck:  Neck supple. No JVD present. Throat: moist mucous membranes. Chest: Effort normal & normal respiratory excursion . Neurological: alert, conversant and oriented . Skin: Skin is not cold. No obvious systemic rash noted. Not diaphoretic. No erythema. Psychiatric:  Grossly normal mood and affect. Behavior appears normal. Extremities:  no clubbing or cyanosis. Abdomen: non distended Lungs:breath sounds normal. No stridor. distress, wheezes or  Rales. Heart:RRR  normal S1, S2, no murmurs, rubs, clicks or gallops , PMI non displaced. Edema: Edema is none. Wt Readings from Last 3 Encounters:  
02/04/19 122 lb 6.4 oz (55.5 kg) 06/04/18 118 lb 9.6 oz (53.8 kg) 11/02/17 120 lb (54.4 kg) BP Readings from Last 3 Encounters:  
02/04/19 144/82  
06/04/18 160/90  
11/02/17 110/70 Current Outpatient Medications Medication Sig  
 lisinopril (PRINIVIL, ZESTRIL) 10 mg tablet Take 10 mg by mouth daily.  atorvastatin (LIPITOR) 10 mg tablet TAKE 1/2 TABLET BY MOUTH EVERY DAY.  ELIQUIS 2.5 mg tablet TAKE 1(ONE) TABLET BY MOUTH 2 TIMES A DAY.  acetaminophen (TYLENOL EXTRA STRENGTH) 500 mg tablet Take  by mouth as needed for Pain.  dofetilide (TIKOSYN) 500 mcg capsule Take 500 mcg by mouth two (2) times a day.  metoprolol succinate (TOPROL-XL) 50 mg XL tablet TAKE 1 TABLET BY MOUTH DAILY.  B COMPLEX WITH VITAMIN C (ALLBEE/C PO) Take 1 Tab by mouth daily. No current facility-administered medications for this visit. Impression see above.

## 2019-02-04 NOTE — PROGRESS NOTES
Chief Complaint Patient presents with  Follow-up 1. Have you been to the ER, urgent care clinic since your last visit? Hospitalized since your last visit? No 
 
2. Have you seen or consulted any other health care providers outside of the [address] [place] [address] since your last visit? Include any pap smears or colon screening. No 
 
3) Do you have an Advance Directive on file? no 
 
Patient is accompanied by self I have received verbal consent from Cristóbal Humphreys to discuss any/all medical information while they are present in the room. Visit Vitals /82 (BP 1 Location: Right arm, BP Patient Position: Sitting) Pulse 81 Resp 18 Ht 5' 5\" (1.651 m) Wt 122 lb 6.4 oz (55.5 kg) SpO2 98% BMI 20.37 kg/m²

## 2019-03-12 DIAGNOSIS — I48.19 PERSISTENT ATRIAL FIBRILLATION (HCC): ICD-10-CM

## 2019-03-12 NOTE — TELEPHONE ENCOUNTER
Request for eliquis 2.5mg BID. Last office visit 2-4-19, next office visit 2-4-20.  Refills per verbal order from Dr. Christopher Hernandes.

## 2019-03-26 RX ORDER — ATORVASTATIN CALCIUM 10 MG/1
TABLET, FILM COATED ORAL
Qty: 45 TAB | Refills: 0 | Status: SHIPPED | OUTPATIENT
Start: 2019-03-26 | End: 2019-06-17 | Stop reason: SDUPTHER

## 2019-06-17 RX ORDER — ATORVASTATIN CALCIUM 10 MG/1
TABLET, FILM COATED ORAL
Qty: 45 TAB | Refills: 0 | Status: SHIPPED | OUTPATIENT
Start: 2019-06-17 | End: 2019-09-13 | Stop reason: SDUPTHER

## 2019-09-13 DIAGNOSIS — E78.5 DYSLIPIDEMIA: Primary | ICD-10-CM

## 2019-09-13 RX ORDER — ATORVASTATIN CALCIUM 10 MG/1
5 TABLET, FILM COATED ORAL DAILY
Qty: 45 TAB | Refills: 3 | Status: SHIPPED | OUTPATIENT
Start: 2019-09-13 | End: 2020-08-31

## 2019-09-13 NOTE — TELEPHONE ENCOUNTER
Request for Lipitor 5mg daily. Last office visit 2-4-19, next office visit 2-4-20.  Refills per verbal order from Dr. Santos Sanders.

## 2019-11-04 ENCOUNTER — OFFICE VISIT (OUTPATIENT)
Dept: INTERNAL MEDICINE CLINIC | Age: 79
End: 2019-11-04

## 2019-11-04 VITALS
DIASTOLIC BLOOD PRESSURE: 80 MMHG | BODY MASS INDEX: 20.18 KG/M2 | TEMPERATURE: 98.1 F | HEIGHT: 65 IN | WEIGHT: 121.13 LBS | SYSTOLIC BLOOD PRESSURE: 152 MMHG | RESPIRATION RATE: 18 BRPM | OXYGEN SATURATION: 98 % | HEART RATE: 98 BPM

## 2019-11-04 DIAGNOSIS — J40 BRONCHITIS: Primary | ICD-10-CM

## 2019-11-04 RX ORDER — DOXYCYCLINE 100 MG/1
100 CAPSULE ORAL 2 TIMES DAILY
Qty: 14 CAP | Refills: 0 | Status: SHIPPED | OUTPATIENT
Start: 2019-11-04 | End: 2019-11-11

## 2019-11-04 RX ORDER — CODEINE PHOSPHATE AND GUAIFENESIN 10; 100 MG/5ML; MG/5ML
10 SOLUTION ORAL
Qty: 473 ML | Refills: 0 | Status: SHIPPED | OUTPATIENT
Start: 2019-11-04 | End: 2019-11-14 | Stop reason: ALTCHOICE

## 2019-11-04 RX ORDER — MOMETASONE FUROATE 50 UG/1
2 SPRAY, METERED NASAL DAILY
Qty: 1 CONTAINER | Refills: 0 | Status: SHIPPED | OUTPATIENT
Start: 2019-11-04 | End: 2019-12-04

## 2019-11-04 NOTE — PROGRESS NOTES
History of Present Illness    Chief Complaint   Cough    Zachariah Humphreys is a 67 y.o. female     Patient presents with a 3-week history of purulent, productive cough. Patient reports that her symptoms initially started as sinus congestion and drainage which has since improved but she now continues to have a productive cough. 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 Has been trying to take Benadryl without much relief. Took an old prescription of cough medicine with codeine that helped her symptoms greatly. Denies any fevers, chills but does report some shortness of breath when she goes into coughing fits. Also reports some ear fullness. Denies any abdominal pain, nausea, vomiting, diarrhea out of her normal, constipation. Denies any sore throat. Review of Systems   Constitutional: Negative for chills and fever. Respiratory: Positive for shortness of breath. Cardiovascular: Negative for chest pain. Physical Exam   Vitals:       Visit Vitals  BP (!) 184/101 (BP 1 Location: Left arm, BP Patient Position: Sitting)   Pulse 98   Temp 98.1 °F (36.7 °C) (Oral)   Resp 18   Ht 5' 5\" (1.651 m)   Wt 121 lb 2 oz (54.9 kg)   LMP  (LMP Unknown)   SpO2 98%   BMI 20.16 kg/m²        Physical Exam   Constitutional: She is oriented to person, place, and time and well-developed, well-nourished, and in no distress. No distress. HENT:   Mouth/Throat: Oropharynx is clear and moist.   Bilateral eardrums with mild bulging but no fluid or pus noted   Eyes: Conjunctivae are normal.   Cardiovascular: Normal rate, regular rhythm and normal heart sounds. Exam reveals no gallop and no friction rub. No murmur heard. Pulmonary/Chest: Effort normal and breath sounds normal. No respiratory distress. She has no wheezes. She has no rales. Abdominal: Soft. Bowel sounds are normal. She exhibits no distension. There is no tenderness. Lymphadenopathy:     She has no cervical adenopathy.    Neurological: She is alert and oriented to person, place, and time. Skin: Skin is warm. Psychiatric: Affect and judgment normal.         Assessment and Plan   1. Bronchitis  Instructed patient to give us a call if her symptoms do not improve in a week. Would consider getting imaging at that time. Deferring imaging today as her lung exam is normal.  - guaiFENesin-codeine (ROBITUSSIN AC) 100-10 mg/5 mL solution; Take 10 mL by mouth every four (4) hours as needed for Cough for up to 14 days. Max Daily Amount: 60 mL. Dispense: 473 mL; Refill: 0  - mometasone (NASONEX) 50 mcg/actuation nasal spray; 2 Sprays by Both Nostrils route daily for 30 days. Dispense: 1 Container; Refill: 0  - doxycycline (VIBRAMYCIN) 100 mg capsule; Take 1 Cap by mouth two (2) times a day for 7 days. Dispense: 14 Cap; Refill: 0    Benefits, risks, and side effects of all new medications were reviewed with the patient.     Return to clinic: As scheduled    Chacorta Ferrell MD  [facility]  11/4/2019    Future Appointments   Date Time Provider Corky Mackenzie   11/4/2019 11:40 AM Kristel Dave NP [facility] SCHED   11/14/2019 11:15 AM Vicente Live MD [address] [address]   2/4/2020  9:00 AM Bandar Stevenson  [address]

## 2019-11-14 ENCOUNTER — OFFICE VISIT (OUTPATIENT)
Dept: INTERNAL MEDICINE CLINIC | Age: 79
End: 2019-11-14

## 2019-11-14 VITALS
WEIGHT: 121 LBS | HEART RATE: 68 BPM | RESPIRATION RATE: 16 BRPM | BODY MASS INDEX: 20.16 KG/M2 | OXYGEN SATURATION: 96 % | HEIGHT: 65 IN | DIASTOLIC BLOOD PRESSURE: 84 MMHG | TEMPERATURE: 98.5 F | SYSTOLIC BLOOD PRESSURE: 177 MMHG

## 2019-11-14 DIAGNOSIS — I48.0 PAROXYSMAL ATRIAL FIBRILLATION (HCC): ICD-10-CM

## 2019-11-14 DIAGNOSIS — R93.89 ABNORMAL CHEST CT: ICD-10-CM

## 2019-11-14 DIAGNOSIS — I10 HTN, GOAL BELOW 130/80: Primary | ICD-10-CM

## 2019-11-14 DIAGNOSIS — E78.5 DYSLIPIDEMIA, GOAL LDL BELOW 100: ICD-10-CM

## 2019-11-14 RX ORDER — LISINOPRIL 20 MG/1
20 TABLET ORAL 2 TIMES DAILY
Qty: 60 TAB | Refills: 0
Start: 2019-11-14 | End: 2019-12-10 | Stop reason: ALTCHOICE

## 2019-11-14 NOTE — PROGRESS NOTES
HISTORY OF PRESENT ILLNESS  Lizz Humphreys is a 67 y.o. female. HPI  Ten day follow up. She was here with URI and ear infection, was treated with Doxycycline and Nasacort. She is feeling better, no fevers or chills, only a little drainage. Hypertension. She is on Lisinopril 20 a day and Toprol 50 a day, now being followed by Dr. Michaelyn Barthel at [facility] and believes she has a follow up with her in about a month. Pressure is running consistently in the 160-170 range. I am asking her to bump the Lisinopril to 20 b.i.d. Episodic a-fib. She is now on Tikosyn, as well as Toprol and Eliquis, and she is back in sinus rhythm, but being followed by cardiology. Abnormal lung CT with bronchiectasis. She has been followed at [facility] and more recently by Dr. Ayden Crum and is stable. Her grandson apparently has a chronic interstitial lung disease that is rare. Review of Systems   Constitutional: Positive for malaise/fatigue. Negative for chills, fever and weight loss. HENT: Negative for congestion, ear pain, nosebleeds and sinus pain. Respiratory: Negative for cough, shortness of breath and wheezing. Cardiovascular: Negative for chest pain, palpitations, orthopnea, leg swelling and PND. Gastrointestinal: Negative for heartburn and nausea. Musculoskeletal: Negative for myalgias. Neurological: Negative for dizziness and headaches. Physical Exam   Constitutional: She is oriented to person, place, and time. She appears well-developed and well-nourished. HENT:   Head: Normocephalic and atraumatic. Right Ear: Tympanic membrane, external ear and ear canal normal.   Left Ear: Tympanic membrane, external ear and ear canal normal.   Nose: Nose normal.   Mouth/Throat: Oropharynx is clear and moist and mucous membranes are normal. No oropharyngeal exudate. Eyes: Pupils are equal, round, and reactive to light. Conjunctivae are normal. Right eye exhibits no discharge. Left eye exhibits no discharge. Neck: Normal range of motion. Neck supple. Carotid bruit is not present. No thyromegaly present. Cardiovascular: Normal rate, regular rhythm, S1 normal, S2 normal, normal heart sounds and intact distal pulses. No murmur heard. Pulmonary/Chest: Effort normal and breath sounds normal. No respiratory distress. She has no wheezes. She has no rales. Musculoskeletal: She exhibits no edema. Lymphadenopathy:     She has no cervical adenopathy. Neurological: She is alert and oriented to person, place, and time. Psychiatric: She has a normal mood and affect. Her behavior is normal.   Nursing note and vitals reviewed. ASSESSMENT and PLAN  Diagnoses and all orders for this visit:    1. HTN, goal below 130/80  -     lisinopril (PRINIVIL, ZESTRIL) 20 mg tablet; Take 1 Tab by mouth two (2) times a day. 2. Dyslipidemia, goal LDL below 100    3. Paroxysmal atrial fibrillation (HCC)  cotn auguster and eliquis and  tikosyn  4.  Abnormal chest CT    Cont follow up with pulmonary  the following changes in treatment are made: inc to lisinopril 20 bid and appt in 1mo with labs

## 2019-12-10 ENCOUNTER — OFFICE VISIT (OUTPATIENT)
Dept: INTERNAL MEDICINE CLINIC | Age: 79
End: 2019-12-10

## 2019-12-10 VITALS
OXYGEN SATURATION: 98 % | WEIGHT: 123 LBS | TEMPERATURE: 97.8 F | HEART RATE: 60 BPM | DIASTOLIC BLOOD PRESSURE: 84 MMHG | RESPIRATION RATE: 16 BRPM | BODY MASS INDEX: 20.49 KG/M2 | HEIGHT: 65 IN | SYSTOLIC BLOOD PRESSURE: 148 MMHG

## 2019-12-10 DIAGNOSIS — R93.89 ABNORMAL CHEST CT: ICD-10-CM

## 2019-12-10 DIAGNOSIS — I10 HTN, GOAL BELOW 130/80: Primary | ICD-10-CM

## 2019-12-10 DIAGNOSIS — I48.0 PAROXYSMAL ATRIAL FIBRILLATION (HCC): ICD-10-CM

## 2019-12-10 DIAGNOSIS — E78.5 DYSLIPIDEMIA, GOAL LDL BELOW 100: ICD-10-CM

## 2019-12-10 RX ORDER — VALSARTAN 320 MG/1
320 TABLET ORAL DAILY
Qty: 30 TAB | Refills: 2 | Status: SHIPPED | OUTPATIENT
Start: 2019-12-10 | End: 2020-01-16 | Stop reason: SDUPTHER

## 2019-12-10 NOTE — PROGRESS NOTES
HISTORY OF PRESENT ILLNESS  Zachariah Humphreys is a 67 y.o. female. HPI  One month follow up for hypertension. I bumped her Lisinopril from 20 to 40 a day. She is still on Toprol 50, as well as Eliquis and Tikosyn. She is having no chest pain, palpitations or edema, however her BP is still running high. We are going to switch Lisinopril to Valsartan. Review of Systems   Constitutional: Negative for chills, fever and weight loss. Respiratory: Negative for cough, shortness of breath and wheezing. Cardiovascular: Negative for chest pain, palpitations, orthopnea, leg swelling and PND. Gastrointestinal: Negative for heartburn and nausea. Musculoskeletal: Negative for myalgias. Neurological: Negative for dizziness and headaches. Physical Exam  Vitals signs and nursing note reviewed. Constitutional:       Appearance: She is well-developed. HENT:      Head: Normocephalic and atraumatic. Neck:      Musculoskeletal: Normal range of motion and neck supple. Thyroid: No thyromegaly. Vascular: No carotid bruit. Cardiovascular:      Rate and Rhythm: Normal rate and regular rhythm. Heart sounds: Normal heart sounds, S1 normal and S2 normal. No murmur. Pulmonary:      Effort: Pulmonary effort is normal. No respiratory distress. Breath sounds: Normal breath sounds. No wheezing or rales. Neurological:      Mental Status: She is alert and oriented to person, place, and time. Psychiatric:         Behavior: Behavior normal.         ASSESSMENT and PLAN  Diagnoses and all orders for this visit:    1. HTN, goal below 130/80  -     valsartan (DIOVAN) 320 mg tablet; Take 1 Tab by mouth daily. 2. Dyslipidemia, goal LDL below 324  -     METABOLIC PANEL, COMPREHENSIVE; Future  -     LIPID PANEL; Future    3. Paroxysmal atrial fibrillation (HCC)-cont meds-     TSH 3RD GENERATION; Future    4. Abnormal chest CT-followed at [facility]  -     [facility] WITH AUTOMATED DIFF;  Future      the following changes in treatment are made: change lisinopril to diovan and appt in 1mo

## 2019-12-11 ENCOUNTER — HOSPITAL ENCOUNTER (OUTPATIENT)
Dept: LAB | Age: 79
Discharge: HOME | End: 2019-12-11

## 2019-12-11 DIAGNOSIS — R93.89 ABNORMAL CHEST CT: ICD-10-CM

## 2019-12-11 DIAGNOSIS — I48.0 PAROXYSMAL ATRIAL FIBRILLATION (HCC): ICD-10-CM

## 2019-12-11 DIAGNOSIS — E78.5 DYSLIPIDEMIA, GOAL LDL BELOW 100: ICD-10-CM

## 2019-12-11 LAB
ALBUMIN SERPL-MCNC: 3.8 G/DL (ref 3.5–5)
ALBUMIN/GLOB SERPL: 1.2 {RATIO} (ref 1.1–2.2)
ALP SERPL-CCNC: 100 U/L (ref 45–117)
ALT SERPL-CCNC: 40 U/L (ref 12–78)
ANION GAP SERPL CALC-SCNC: 6 MMOL/L (ref 5–15)
AST SERPL-CCNC: 29 U/L (ref 15–37)
BASOPHILS # BLD: 0.1 K/UL (ref 0–0.1)
BASOPHILS NFR BLD: 1 % (ref 0–1)
BILIRUB SERPL-MCNC: 0.4 MG/DL (ref 0.2–1)
BUN SERPL-MCNC: 17 MG/DL (ref 6–20)
BUN/CREAT SERPL: 22 (ref 12–20)
CALCIUM SERPL-MCNC: 9.4 MG/DL (ref 8.5–10.1)
CHLORIDE SERPL-SCNC: 105 MMOL/L (ref 97–108)
CHOLEST SERPL-MCNC: 165 MG/DL
CO2 SERPL-SCNC: 29 MMOL/L (ref 21–32)
CREAT SERPL-MCNC: 0.79 MG/DL (ref 0.55–1.02)
DIFFERENTIAL METHOD BLD: NORMAL
EOSINOPHIL # BLD: 0.1 K/UL (ref 0–0.4)
EOSINOPHIL NFR BLD: 1 % (ref 0–7)
ERYTHROCYTE [DISTWIDTH] IN BLOOD BY AUTOMATED COUNT: 13.3 % (ref 11.5–14.5)
GLOBULIN SER CALC-MCNC: 3.3 G/DL (ref 2–4)
GLUCOSE SERPL-MCNC: 96 MG/DL (ref 65–100)
HCT VFR BLD AUTO: 39.2 % (ref 35–47)
HDLC SERPL-MCNC: 64 MG/DL
HDLC SERPL: 2.6 {RATIO} (ref 0–5)
HGB BLD-MCNC: 12.1 G/DL (ref 11.5–16)
IMM GRANULOCYTES # BLD AUTO: 0 K/UL (ref 0–0.04)
IMM GRANULOCYTES NFR BLD AUTO: 0 % (ref 0–0.5)
LDLC SERPL CALC-MCNC: 75.6 MG/DL (ref 0–100)
LIPID PROFILE,FLP: NORMAL
LYMPHOCYTES # BLD: 2 K/UL (ref 0.8–3.5)
LYMPHOCYTES NFR BLD: 24 % (ref 12–49)
MCH RBC QN AUTO: 30 PG (ref 26–34)
MCHC RBC AUTO-ENTMCNC: 30.9 G/DL (ref 30–36.5)
MCV RBC AUTO: 97 FL (ref 80–99)
MONOCYTES # BLD: 0.8 K/UL (ref 0–1)
MONOCYTES NFR BLD: 10 % (ref 5–13)
NEUTS SEG # BLD: 5.4 K/UL (ref 1.8–8)
NEUTS SEG NFR BLD: 64 % (ref 32–75)
NRBC # BLD: 0 K/UL (ref 0–0.01)
NRBC BLD-RTO: 0 PER 100 WBC
PLATELET # BLD AUTO: 218 K/UL (ref 150–400)
PMV BLD AUTO: 11.1 FL (ref 8.9–12.9)
POTASSIUM SERPL-SCNC: 4.8 MMOL/L (ref 3.5–5.1)
PROT SERPL-MCNC: 7.1 G/DL (ref 6.4–8.2)
RBC # BLD AUTO: 4.04 M/UL (ref 3.8–5.2)
SODIUM SERPL-SCNC: 140 MMOL/L (ref 136–145)
TRIGL SERPL-MCNC: 127 MG/DL (ref ?–150)
TSH SERPL DL<=0.05 MIU/L-ACNC: 1.52 UIU/ML (ref 0.36–3.74)
VLDLC SERPL CALC-MCNC: 25.4 MG/DL
WBC # BLD AUTO: 8.4 K/UL (ref 3.6–11)

## 2019-12-30 ENCOUNTER — TELEPHONE (OUTPATIENT)
Dept: INTERNAL MEDICINE CLINIC | Age: 79
End: 2019-12-30

## 2019-12-30 NOTE — TELEPHONE ENCOUNTER
Reassured per MD that it is very unlikely her cough is from the valsartan. Advised she resume the valsartan as directed & appt in 2 weeks. Patient voiced understanding.

## 2019-12-30 NOTE — TELEPHONE ENCOUNTER
Returned call to patient. Due to her elevated bp on lisinopril 40 mg PCP switched her to Valsartan 320 mg. Patient did not start the Valsartan until Saturday Dec 21st as she wanted to wait until school was out. She states the developed a bad cough the week of christmas along with some sinus pressure. She denied signs of fever or chills. She attributed the cough to the Valsartan so she stopped this end of last week & resumed her Lisinopril. She states her cough improved after stopping the Valsartan. She has not taken an bp medicine today & wants MD's opinion on what she should be on. Discussed more than likely since she developed sinus around the same time her cough came on her symptoms may be from allergies or a mild cold rather than the valsartan. She would like an MD's opinion as well. She is aware PCP is out of the office.

## 2019-12-30 NOTE — TELEPHONE ENCOUNTER
Patient reports new medication Diovan 320 MG tablet has caused a cough along with what seems like spasms. Patient has taken herself off this medication as of last Thursday. Patient reports going back on the remainder of her old BP medication atorvastatin. Today the patient has not taken either medication and request nurse please call to advise which she should take. Please call patient to advise.  [phone]

## 2019-12-30 NOTE — TELEPHONE ENCOUNTER
Cough unlikely from valsartan versus lisinopril. I would now resume valsartan and monitor blood pressure. Follow-up with PCP after 2 weeks with blood pressure log.

## 2020-01-16 ENCOUNTER — OFFICE VISIT (OUTPATIENT)
Dept: INTERNAL MEDICINE CLINIC | Age: 80
End: 2020-01-16

## 2020-01-16 VITALS
BODY MASS INDEX: 20.49 KG/M2 | OXYGEN SATURATION: 98 % | SYSTOLIC BLOOD PRESSURE: 165 MMHG | HEIGHT: 65 IN | TEMPERATURE: 97.6 F | DIASTOLIC BLOOD PRESSURE: 81 MMHG | HEART RATE: 63 BPM | RESPIRATION RATE: 16 BRPM | WEIGHT: 123 LBS

## 2020-01-16 DIAGNOSIS — J47.9 BRONCHIECTASIS WITHOUT COMPLICATION (HCC): ICD-10-CM

## 2020-01-16 DIAGNOSIS — I48.0 PAROXYSMAL ATRIAL FIBRILLATION (HCC): ICD-10-CM

## 2020-01-16 DIAGNOSIS — R05.9 COUGH IN ADULT: Primary | ICD-10-CM

## 2020-01-16 DIAGNOSIS — I10 HTN, GOAL BELOW 130/80: ICD-10-CM

## 2020-01-16 RX ORDER — VALSARTAN 320 MG/1
320 TABLET ORAL DAILY
Qty: 30 TAB | Refills: 2 | Status: SHIPPED | OUTPATIENT
Start: 2020-01-16 | End: 2020-03-31

## 2020-01-16 RX ORDER — CHLORTHALIDONE 25 MG/1
25 TABLET ORAL DAILY
Qty: 30 TAB | Refills: 3 | Status: SHIPPED | OUTPATIENT
Start: 2020-01-16 | End: 2020-04-15 | Stop reason: ALTCHOICE

## 2020-01-16 NOTE — PROGRESS NOTES
HISTORY OF PRESENT ILLNESS  Rosemary Humphreys is a 67 y.o. female. HPI  Gregg Ramirez stopped her Valsartan briefly, but is now back on the 320, along with Toprol 50. Her BP is running high. She feels it is because she has had a crazy week with funerals. I am concerned, however. Will start low dose Chlorthalidone, aware that she will need follow up chemistries and close follow up with cardiology because of being on Tikosyn for her a-fib. Cough, productive of clear phlegm. No fevers or chills. She believes it is from bronchiectasis, but clearly no illness with it right now. Urge incontinence. Will see Dr. Daryle Schneider soon and would like records sent there. Review of Systems   Constitutional: Negative for chills, fever, malaise/fatigue and weight loss. Respiratory: Positive for cough. Negative for shortness of breath and wheezing. Cardiovascular: Negative for chest pain, palpitations, orthopnea, leg swelling and PND. Gastrointestinal: Negative for heartburn and nausea. Genitourinary: Positive for urgency. Negative for dysuria. Musculoskeletal: Negative for myalgias. Neurological: Negative for dizziness and headaches. Physical Exam  Vitals signs and nursing note reviewed. Constitutional:       Appearance: She is well-developed. HENT:      Head: Normocephalic and atraumatic. Neck:      Musculoskeletal: Normal range of motion and neck supple. Thyroid: No thyromegaly. Vascular: No carotid bruit. Cardiovascular:      Rate and Rhythm: Normal rate and regular rhythm. Heart sounds: Normal heart sounds, S1 normal and S2 normal. No murmur. Pulmonary:      Effort: Pulmonary effort is normal. No respiratory distress. Breath sounds: Normal breath sounds. No wheezing or rales. Neurological:      Mental Status: She is alert and oriented to person, place, and time.    Psychiatric:         Behavior: Behavior normal.         ASSESSMENT and PLAN  Diagnoses and all orders for this visit: 1. Cough in adult-no sxs of infection    2. HTN, goal below 130/80  -     valsartan (DIOVAN) 320 mg tablet; Take 1 Tab by mouth daily. -     chlorthalidone (HYGROTEN) 25 mg tablet; Take 1 Tab by mouth daily.     3. Bronchiectasis without complication (HCC)    4. Paroxysmal atrial fibrillation ([facility]    appt with me in 3mo and check labs   Also cont follow up with cardiology

## 2020-02-19 ENCOUNTER — TELEPHONE (OUTPATIENT)
Dept: INTERNAL MEDICINE CLINIC | Age: 80
End: 2020-02-19

## 2020-02-19 NOTE — TELEPHONE ENCOUNTER
Per Pharmacist Lorena Walls with patient's [address]  States she is showing  A drug interaction alert between patient's  Chlorthalidone and Tikosyn, requesting a call back at [phone]

## 2020-02-19 NOTE — TELEPHONE ENCOUNTER
Notified pharmacy to hold the Chlorthalidone for now & we will have the patient contact her cardiologist to get their input on the interaction between chlorthalidone & Tikosyn & if this interaction poses a big risk.

## 2020-03-03 DIAGNOSIS — I48.19 PERSISTENT ATRIAL FIBRILLATION (HCC): ICD-10-CM

## 2020-03-04 NOTE — TELEPHONE ENCOUNTER
Patient will be out of the medication this weekend. 90 days supply.  Please advise      [facility][phone] leave msg

## 2020-03-05 NOTE — TELEPHONE ENCOUNTER
Request for Eliquis 2.5mg BID. Last office visit 2-4-19, next office visit 5-1-20.  Refills per verbal order from Dr. Rosales Enriquez.

## 2020-03-31 DIAGNOSIS — I10 HTN, GOAL BELOW 130/80: ICD-10-CM

## 2020-03-31 RX ORDER — VALSARTAN 320 MG/1
TABLET ORAL
Qty: 30 TAB | Refills: 0 | Status: SHIPPED | OUTPATIENT
Start: 2020-03-31 | End: 2020-04-15 | Stop reason: SDUPTHER

## 2020-04-14 ENCOUNTER — TELEPHONE (OUTPATIENT)
Dept: INTERNAL MEDICINE CLINIC | Age: 80
End: 2020-04-14

## 2020-04-14 NOTE — TELEPHONE ENCOUNTER
V/m left for return call as her message is unclear. Unsure if notifying she never started the chlorthalidone or if requesting refill for her  Valsartan.

## 2020-04-14 NOTE — TELEPHONE ENCOUNTER
----- Message from Sachi Aparicio sent at 4/14/2020  9:35 AM EDT -----  Regarding: DR GUZMAN/ TELEPHONE  GENERAL/ Medication Refill      Pt reports: That \"chlorthalidone 25 mg\" was not refilled. She is now taking Amlodipine.          Best contact number(s):  [phone]    \"VALSARTAN 320 MG \"      Is patient out of this medication (yes/no): [address] listed in chart  Pharmacy phone number: [address] [employer]

## 2020-04-14 NOTE — TELEPHONE ENCOUNTER
Patient is returning a call, states she can be reached at [phone] , states she had two things too discuss . Needing a refill on valsartan asap and states in jan / feb PCP started her on a med , states pharmacist will not refill due to a drug interaction alert states per pharmacist she  shouldve have taken nor been prescribed the medication due to meds she is on already .  States she hasn't heard back from our office so she spoke with Dr Miranda Walker  at [facility] who prescribed another medication,       Please give patient a call per request to discuss

## 2020-04-15 ENCOUNTER — HOSPITAL ENCOUNTER (OUTPATIENT)
Dept: LAB | Age: 80
Discharge: HOME | End: 2020-04-15

## 2020-04-15 ENCOUNTER — OFFICE VISIT (OUTPATIENT)
Dept: INTERNAL MEDICINE CLINIC | Age: 80
End: 2020-04-15

## 2020-04-15 ENCOUNTER — TELEPHONE (OUTPATIENT)
Dept: INTERNAL MEDICINE CLINIC | Age: 80
End: 2020-04-15

## 2020-04-15 VITALS
BODY MASS INDEX: 20.49 KG/M2 | TEMPERATURE: 97.9 F | RESPIRATION RATE: 18 BRPM | HEIGHT: 65 IN | OXYGEN SATURATION: 98 % | DIASTOLIC BLOOD PRESSURE: 76 MMHG | WEIGHT: 123 LBS | HEART RATE: 61 BPM | SYSTOLIC BLOOD PRESSURE: 138 MMHG

## 2020-04-15 DIAGNOSIS — I10 HTN, GOAL BELOW 130/80: ICD-10-CM

## 2020-04-15 DIAGNOSIS — E78.5 DYSLIPIDEMIA, GOAL LDL BELOW 100: ICD-10-CM

## 2020-04-15 DIAGNOSIS — I48.0 PAROXYSMAL ATRIAL FIBRILLATION (HCC): ICD-10-CM

## 2020-04-15 DIAGNOSIS — I10 HTN, GOAL BELOW 130/80: Primary | ICD-10-CM

## 2020-04-15 LAB
ALBUMIN SERPL-MCNC: 4 G/DL (ref 3.5–5)
ALBUMIN/GLOB SERPL: 1.2 {RATIO} (ref 1.1–2.2)
ALP SERPL-CCNC: 108 U/L (ref 45–117)
ALT SERPL-CCNC: 36 U/L (ref 12–78)
ANION GAP SERPL CALC-SCNC: 7 MMOL/L (ref 5–15)
AST SERPL-CCNC: 26 U/L (ref 15–37)
BILIRUB SERPL-MCNC: 0.4 MG/DL (ref 0.2–1)
BUN SERPL-MCNC: 21 MG/DL (ref 6–20)
BUN/CREAT SERPL: 24 (ref 12–20)
CALCIUM SERPL-MCNC: 9.4 MG/DL (ref 8.5–10.1)
CHLORIDE SERPL-SCNC: 104 MMOL/L (ref 97–108)
CHOLEST SERPL-MCNC: 166 MG/DL
CO2 SERPL-SCNC: 28 MMOL/L (ref 21–32)
CREAT SERPL-MCNC: 0.89 MG/DL (ref 0.55–1.02)
GLOBULIN SER CALC-MCNC: 3.4 G/DL (ref 2–4)
GLUCOSE SERPL-MCNC: 84 MG/DL (ref 65–100)
HDLC SERPL-MCNC: 68 MG/DL
HDLC SERPL: 2.4 {RATIO} (ref 0–5)
LDLC SERPL CALC-MCNC: 81.2 MG/DL (ref 0–100)
LIPID PROFILE,FLP: NORMAL
POTASSIUM SERPL-SCNC: 4.6 MMOL/L (ref 3.5–5.1)
PROT SERPL-MCNC: 7.4 G/DL (ref 6.4–8.2)
SODIUM SERPL-SCNC: 139 MMOL/L (ref 136–145)
TRIGL SERPL-MCNC: 84 MG/DL (ref ?–150)
VLDLC SERPL CALC-MCNC: 16.8 MG/DL

## 2020-04-15 RX ORDER — VALSARTAN 320 MG/1
TABLET ORAL
Qty: 90 TAB | Refills: 1 | Status: SHIPPED | OUTPATIENT
Start: 2020-04-15 | End: 2020-09-28

## 2020-04-15 RX ORDER — AMLODIPINE BESYLATE 5 MG/1
5 TABLET ORAL DAILY
Qty: 90 TAB | Refills: 1 | Status: SHIPPED | OUTPATIENT
Start: 2020-04-15 | End: 2020-10-11

## 2020-04-15 RX ORDER — TROSPIUM CHLORIDE 20 MG/1
20 TABLET, FILM COATED ORAL DAILY
COMMUNITY
End: 2021-01-07 | Stop reason: ALTCHOICE

## 2020-04-15 RX ORDER — AMLODIPINE BESYLATE 5 MG/1
5 TABLET ORAL DAILY
COMMUNITY
End: 2020-04-15 | Stop reason: SDUPTHER

## 2020-04-15 NOTE — PROGRESS NOTES
HISTORY OF PRESENT ILLNESS  Jasmin Humphreys is a 67 y.o. female. HPI  (poor audio quality - skipping)    Genoveva Villarreal is seen for blood pressure follow up. Since I last saw her Dr. Amado Lockwood started her on Amlodipine 5 mg daily for her blood pressure. She is still on Valsartan 320. She is off of the Chlorthalidone because of pharmacy concern about interaction with Tikosyn. She is still on Toprol 50 mg a day. She feels fine with this, no headaches or dizzy spells, no chest pain or shortness of breath. Urinary urgency. sanctura at [facility].i[employer]. was too drying, so she is using it just once a day. She has just really started this. Dyslipidemia, on Lipitor 10. No diffuse myalgias. She is due for labs. [place] [facility]. Review of Systems   Constitutional: Negative for chills, fever and weight loss. Respiratory: Negative for cough, shortness of breath and wheezing. Cardiovascular: Negative for chest pain, palpitations, orthopnea, leg swelling and PND. Gastrointestinal: Negative for heartburn and nausea. Genitourinary: Positive for urgency. Negative for dysuria and hematuria. Musculoskeletal: Negative for myalgias. Neurological: Negative for dizziness, tingling, sensory change and headaches. Physical Exam  Vitals signs and nursing note reviewed. Constitutional:       Appearance: She is well-developed. HENT:      Head: Normocephalic and atraumatic. Neck:      Musculoskeletal: Normal range of motion and neck supple. Thyroid: No thyromegaly. Vascular: No carotid bruit. Cardiovascular:      Rate and Rhythm: Normal rate and regular rhythm. Heart sounds: Normal heart sounds, S1 normal and S2 normal. No murmur. Pulmonary:      Effort: Pulmonary effort is normal. No respiratory distress. Breath sounds: Normal breath sounds. No wheezing or rales. Musculoskeletal:      Right lower leg: No edema. Left lower leg: No edema. Skin:     Findings: No rash. Neurological:      Mental Status: She is alert and oriented to person, place, and time. Psychiatric:         Behavior: Behavior normal.         ASSESSMENT and PLAN  Diagnoses and all orders for this visit:    1. HTN, goal below 130/80-bp  Stable on current meds off chlorthalidone  -     METABOLIC PANEL, COMPREHENSIVE; Future  -     valsartan (DIOVAN) 320 mg tablet; TAKE 1 TABLET BY MOUTH DAILY  -     amLODIPine (NORVASC) 5 mg tablet; Take 1 Tab by mouth daily. 2. Dyslipidemia, goal LDL below 100-cont lipitor  -     LIPID PANEL; Future    3.  Paroxysmal atrial fibrillation (HCC)-cont eliquis and toprol and tikosyn sees dr Allen Bassett

## 2020-04-15 NOTE — TELEPHONE ENCOUNTER
Patient requesting refill for her Valsartan. Offered VV in place of in-office visit for her med check but patient states her phone is old & does not have a camera. Patient would like to r/s her appt to today if possible. Patient r/s for today at 3:15 for her bp check.

## 2020-04-15 NOTE — TELEPHONE ENCOUNTER
----- Message from Miki Collins sent at 4/15/2020 10:11 AM EDT -----  Regarding: DR Jonathan Brown / [employer]  General Message/Vendor Calls    \"VALSARTAN 320 MG\"      Callback required     Best contact number(s):[phone]          Miki Collins

## 2020-08-26 DIAGNOSIS — E78.5 DYSLIPIDEMIA: ICD-10-CM

## 2020-08-31 RX ORDER — ATORVASTATIN CALCIUM 10 MG/1
TABLET, FILM COATED ORAL
Qty: 45 TAB | Refills: 0 | Status: SHIPPED | OUTPATIENT
Start: 2020-08-31 | End: 2020-10-26 | Stop reason: SDUPTHER

## 2020-09-28 DIAGNOSIS — I10 HTN, GOAL BELOW 130/80: ICD-10-CM

## 2020-09-28 RX ORDER — VALSARTAN 320 MG/1
TABLET ORAL
Qty: 90 TAB | Refills: 1 | Status: SHIPPED | OUTPATIENT
Start: 2020-09-28 | End: 2021-03-25

## 2020-10-10 DIAGNOSIS — I10 HTN, GOAL BELOW 130/80: ICD-10-CM

## 2020-10-11 RX ORDER — AMLODIPINE BESYLATE 5 MG/1
TABLET ORAL
Qty: 90 TAB | Refills: 1 | Status: SHIPPED | OUTPATIENT
Start: 2020-10-11 | End: 2020-10-26

## 2020-10-26 ENCOUNTER — OFFICE VISIT (OUTPATIENT)
Dept: INTERNAL MEDICINE CLINIC | Age: 80
End: 2020-10-26
Payer: MEDICARE

## 2020-10-26 VITALS
HEIGHT: 65 IN | RESPIRATION RATE: 18 BRPM | SYSTOLIC BLOOD PRESSURE: 150 MMHG | BODY MASS INDEX: 20.49 KG/M2 | HEART RATE: 60 BPM | WEIGHT: 123 LBS | OXYGEN SATURATION: 98 % | DIASTOLIC BLOOD PRESSURE: 76 MMHG

## 2020-10-26 DIAGNOSIS — I10 HTN, GOAL BELOW 130/80: Primary | ICD-10-CM

## 2020-10-26 DIAGNOSIS — I48.0 PAROXYSMAL ATRIAL FIBRILLATION (HCC): ICD-10-CM

## 2020-10-26 DIAGNOSIS — E78.5 DYSLIPIDEMIA: ICD-10-CM

## 2020-10-26 DIAGNOSIS — C50.111 MALIGNANT NEOPLASM OF CENTRAL PORTION OF RIGHT FEMALE BREAST, UNSPECIFIED ESTROGEN RECEPTOR STATUS (HCC): ICD-10-CM

## 2020-10-26 DIAGNOSIS — E78.5 DYSLIPIDEMIA, GOAL LDL BELOW 100: ICD-10-CM

## 2020-10-26 DIAGNOSIS — J47.9 BRONCHIECTASIS WITHOUT COMPLICATION (HCC): ICD-10-CM

## 2020-10-26 PROBLEM — N28.9 KIDNEY LESION: Status: ACTIVE | Noted: 2018-02-08

## 2020-10-26 PROBLEM — R06.09 DYSPNEA ON EXERTION: Status: ACTIVE | Noted: 2018-02-08

## 2020-10-26 PROBLEM — Z12.39 BREAST CANCER SCREENING, HIGH RISK PATIENT: Status: ACTIVE | Noted: 2018-02-08

## 2020-10-26 LAB
ALBUMIN SERPL-MCNC: 4 G/DL (ref 3.5–5)
ALBUMIN/GLOB SERPL: 1.3 {RATIO} (ref 1.1–2.2)
ALP SERPL-CCNC: 108 U/L (ref 45–117)
ALT SERPL-CCNC: 28 U/L (ref 12–78)
ANION GAP SERPL CALC-SCNC: 7 MMOL/L (ref 5–15)
AST SERPL-CCNC: 26 U/L (ref 15–37)
BASOPHILS # BLD: 0.1 K/UL (ref 0–0.1)
BASOPHILS NFR BLD: 1 % (ref 0–1)
BILIRUB SERPL-MCNC: 0.3 MG/DL (ref 0.2–1)
BUN SERPL-MCNC: 19 MG/DL (ref 6–20)
BUN/CREAT SERPL: 21 (ref 12–20)
CALCIUM SERPL-MCNC: 9.3 MG/DL (ref 8.5–10.1)
CHLORIDE SERPL-SCNC: 106 MMOL/L (ref 97–108)
CHOLEST SERPL-MCNC: 147 MG/DL
CO2 SERPL-SCNC: 26 MMOL/L (ref 21–32)
CREAT SERPL-MCNC: 0.9 MG/DL (ref 0.55–1.02)
DIFFERENTIAL METHOD BLD: NORMAL
EOSINOPHIL # BLD: 0.1 K/UL (ref 0–0.4)
EOSINOPHIL NFR BLD: 1 % (ref 0–7)
ERYTHROCYTE [DISTWIDTH] IN BLOOD BY AUTOMATED COUNT: 12.8 % (ref 11.5–14.5)
GLOBULIN SER CALC-MCNC: 3.2 G/DL (ref 2–4)
GLUCOSE SERPL-MCNC: 79 MG/DL (ref 65–100)
HCT VFR BLD AUTO: 37.3 % (ref 35–47)
HDLC SERPL-MCNC: 60 MG/DL
HDLC SERPL: 2.5 {RATIO} (ref 0–5)
HGB BLD-MCNC: 11.6 G/DL (ref 11.5–16)
IMM GRANULOCYTES # BLD AUTO: 0 K/UL (ref 0–0.04)
IMM GRANULOCYTES NFR BLD AUTO: 0 % (ref 0–0.5)
LDLC SERPL CALC-MCNC: 74.6 MG/DL (ref 0–100)
LIPID PROFILE,FLP: NORMAL
LYMPHOCYTES # BLD: 2.3 K/UL (ref 0.8–3.5)
LYMPHOCYTES NFR BLD: 22 % (ref 12–49)
MCH RBC QN AUTO: 30.4 PG (ref 26–34)
MCHC RBC AUTO-ENTMCNC: 31.1 G/DL (ref 30–36.5)
MCV RBC AUTO: 97.6 FL (ref 80–99)
MONOCYTES # BLD: 0.9 K/UL (ref 0–1)
MONOCYTES NFR BLD: 9 % (ref 5–13)
NEUTS SEG # BLD: 7.2 K/UL (ref 1.8–8)
NEUTS SEG NFR BLD: 67 % (ref 32–75)
NRBC # BLD: 0 K/UL (ref 0–0.01)
NRBC BLD-RTO: 0 PER 100 WBC
PLATELET # BLD AUTO: 269 K/UL (ref 150–400)
PMV BLD AUTO: 10.4 FL (ref 8.9–12.9)
POTASSIUM SERPL-SCNC: 4.2 MMOL/L (ref 3.5–5.1)
PROT SERPL-MCNC: 7.2 G/DL (ref 6.4–8.2)
RBC # BLD AUTO: 3.82 M/UL (ref 3.8–5.2)
SODIUM SERPL-SCNC: 139 MMOL/L (ref 136–145)
TRIGL SERPL-MCNC: 62 MG/DL (ref ?–150)
TSH SERPL DL<=0.05 MIU/L-ACNC: 1.25 UIU/ML (ref 0.36–3.74)
VLDLC SERPL CALC-MCNC: 12.4 MG/DL
WBC # BLD AUTO: 10.6 K/UL (ref 3.6–11)

## 2020-10-26 PROCEDURE — G0463 HOSPITAL OUTPT CLINIC VISIT: HCPCS | Performed by: INTERNAL MEDICINE

## 2020-10-26 PROCEDURE — G8510 SCR DEP NEG, NO PLAN REQD: HCPCS | Performed by: INTERNAL MEDICINE

## 2020-10-26 PROCEDURE — G8536 NO DOC ELDER MAL SCRN: HCPCS | Performed by: INTERNAL MEDICINE

## 2020-10-26 PROCEDURE — G8420 CALC BMI NORM PARAMETERS: HCPCS | Performed by: INTERNAL MEDICINE

## 2020-10-26 PROCEDURE — 1101F PT FALLS ASSESS-DOCD LE1/YR: CPT | Performed by: INTERNAL MEDICINE

## 2020-10-26 PROCEDURE — 1090F PRES/ABSN URINE INCON ASSESS: CPT | Performed by: INTERNAL MEDICINE

## 2020-10-26 PROCEDURE — G8753 SYS BP > OR = 140: HCPCS | Performed by: INTERNAL MEDICINE

## 2020-10-26 PROCEDURE — 99214 OFFICE O/P EST MOD 30 MIN: CPT | Performed by: INTERNAL MEDICINE

## 2020-10-26 PROCEDURE — G8427 DOCREV CUR MEDS BY ELIG CLIN: HCPCS | Performed by: INTERNAL MEDICINE

## 2020-10-26 PROCEDURE — G8754 DIAS BP LESS 90: HCPCS | Performed by: INTERNAL MEDICINE

## 2020-10-26 RX ORDER — ATORVASTATIN CALCIUM 10 MG/1
TABLET, FILM COATED ORAL
Qty: 45 TAB | Refills: 1 | Status: SHIPPED | OUTPATIENT
Start: 2020-10-26 | End: 2021-04-22

## 2020-10-26 RX ORDER — AMLODIPINE BESYLATE 5 MG/1
7.5 TABLET ORAL DAILY
Qty: 135 TAB | Refills: 1 | Status: SHIPPED | OUTPATIENT
Start: 2020-10-26 | End: 2021-05-28

## 2020-10-26 NOTE — PROGRESS NOTES
HISTORY OF PRESENT ILLNESS  Nickie Humphreys is a 79 y.o. female. HPI  Seen for med check. 1. Dyslipidemia. She is on atorvastatin 10 mg 1/2 tab. She asks if I can take over prescribing this. Due for LFTs. 2. Hypertension. On valsartan 320, amlodipine 5 and Toprol 50. Pressure she reports has been between 140-170. We will bump amlodipine to 7.5. She sees Dr. Maura Lockwood at [facility] in December. 3. For history of paroxysmal atrial fibrillation, she does remain on Eliquis. She has had no unusual bleeding. 4. Lung nodule is being followed by thoracic surgery at [facility]. She gets regular CT scans. 5. History of right breast cancer and she is followed at [facility] for this. No evidence of recurrence. Review of Systems   Constitutional: Positive for malaise/fatigue. Negative for chills, diaphoresis, fever and weight loss. Respiratory: Positive for cough. Negative for shortness of breath and wheezing. Cardiovascular: Negative for chest pain, palpitations, orthopnea, leg swelling and PND. Gastrointestinal: Negative for abdominal pain, diarrhea, heartburn, nausea and vomiting. Genitourinary: Negative for dysuria. Musculoskeletal: Negative for myalgias. Neurological: Negative for dizziness and headaches. Physical Exam  Vitals signs and nursing note reviewed. Constitutional:       Appearance: She is well-developed. HENT:      Head: Normocephalic and atraumatic. Neck:      Musculoskeletal: Normal range of motion and neck supple. Thyroid: No thyromegaly. Vascular: No carotid bruit. Cardiovascular:      Rate and Rhythm: Normal rate and regular rhythm. Heart sounds: Normal heart sounds, S1 normal and S2 normal. No murmur. Pulmonary:      Effort: Pulmonary effort is normal. No respiratory distress. Breath sounds: Normal breath sounds. No wheezing or rales. Musculoskeletal:      Right lower leg: No edema. Left lower leg: No edema.    Neurological:      Mental Status: She is alert and oriented to person, place, and time. Psychiatric:         Behavior: Behavior normal.         ASSESSMENT and PLAN  Diagnoses and all orders for this visit:    1. HTN, goal below 130/80-inc to 7.5 mg dose  See dr Leandro Presley in dec  appt with me in 6mo  -     amLODIPine (NORVASC) 5 mg tablet; Take 1.5 Tabs by mouth daily. 2. Dyslipidemia, goal LDL below 974  -     METABOLIC PANEL, COMPREHENSIVE; Future  -     LIPID PANEL; Future  -     TSH 3RD GENERATION; Future    3. Paroxysmal atrial fibrillation (HCC)-cont eliquis    4. Bronchiectasis without complication ([facility]  -     CBC WITH AUTOMATED DIFF; Future    5. Malignant neoplasm of central portion of right female breast, unspecified estrogen receptor status ([facility].)  -     CBC WITH AUTOMATED DIFF; Future    6.  Dyslipidemia  -     atorvastatin (LIPITOR) 10 mg tablet; TAKE 1/2 TABLET BY MOUTH EVERY DAY

## 2020-11-25 PROBLEM — Z12.39 BREAST CANCER SCREENING, HIGH RISK PATIENT: Status: RESOLVED | Noted: 2018-02-08 | Resolved: 2020-11-25

## 2021-01-07 ENCOUNTER — OFFICE VISIT (OUTPATIENT)
Dept: INTERNAL MEDICINE CLINIC | Age: 81
End: 2021-01-07
Payer: MEDICARE

## 2021-01-07 VITALS
SYSTOLIC BLOOD PRESSURE: 142 MMHG | TEMPERATURE: 97.5 F | DIASTOLIC BLOOD PRESSURE: 76 MMHG | OXYGEN SATURATION: 98 % | HEIGHT: 65 IN | BODY MASS INDEX: 20.89 KG/M2 | RESPIRATION RATE: 16 BRPM | WEIGHT: 125.4 LBS | HEART RATE: 71 BPM

## 2021-01-07 DIAGNOSIS — I10 HTN, GOAL BELOW 130/80: ICD-10-CM

## 2021-01-07 DIAGNOSIS — G57.62 MORTON'S NEUROMA OF LEFT FOOT: Primary | ICD-10-CM

## 2021-01-07 PROCEDURE — 1101F PT FALLS ASSESS-DOCD LE1/YR: CPT | Performed by: NURSE PRACTITIONER

## 2021-01-07 PROCEDURE — G8753 SYS BP > OR = 140: HCPCS | Performed by: NURSE PRACTITIONER

## 2021-01-07 PROCEDURE — G8420 CALC BMI NORM PARAMETERS: HCPCS | Performed by: NURSE PRACTITIONER

## 2021-01-07 PROCEDURE — G8432 DEP SCR NOT DOC, RNG: HCPCS | Performed by: NURSE PRACTITIONER

## 2021-01-07 PROCEDURE — 99213 OFFICE O/P EST LOW 20 MIN: CPT | Performed by: NURSE PRACTITIONER

## 2021-01-07 PROCEDURE — 1090F PRES/ABSN URINE INCON ASSESS: CPT | Performed by: NURSE PRACTITIONER

## 2021-01-07 PROCEDURE — G8754 DIAS BP LESS 90: HCPCS | Performed by: NURSE PRACTITIONER

## 2021-01-07 PROCEDURE — G8427 DOCREV CUR MEDS BY ELIG CLIN: HCPCS | Performed by: NURSE PRACTITIONER

## 2021-01-07 PROCEDURE — G0463 HOSPITAL OUTPT CLINIC VISIT: HCPCS | Performed by: NURSE PRACTITIONER

## 2021-01-07 PROCEDURE — G8536 NO DOC ELDER MAL SCRN: HCPCS | Performed by: NURSE PRACTITIONER

## 2021-01-07 RX ORDER — CARVEDILOL 3.12 MG/1
TABLET ORAL
COMMUNITY
Start: 2021-01-04 | End: 2022-01-24 | Stop reason: DRUGHIGH

## 2021-01-07 NOTE — PATIENT INSTRUCTIONS
Pardo's Neuroma: Care Instructions  Your Care Instructions  When your toes are squeezed together, often over a period of months or even years, the nerve that runs between the toes can swell and get thicker. This is called a Pardo's neuroma. It may feel like a small lump is pushing inside the ball of your foot. When you walk or move your toes, you feel pain that sometimes moves into your toes. If the pressure continues, it may damage the nerve. If you catch the problem early and change your shoes, the nerve swelling may go away. Your doctor may advise you to wear wide-toed shoes. He or she also may suggest that you ice the sore spot and limit activities that put pressure on the nerve. If these steps do not help your symptoms, your doctor may have you use special pads or devices that spread the toes. This keeps them from squeezing the nerve. In some cases, you may get a cortisone shot to reduce swelling and pain. If these treatments don't help, your doctor may suggest surgery to relieve pressure or remove the swollen nerve. Follow-up care is a key part of your treatment and safety. Be sure to make and go to all appointments, and call your doctor if you are having problems. It's also a good idea to know your test results and keep a list of the medicines you take. How can you care for yourself at home? · Ask your doctor if you can take an over-the-counter pain medicine, such as acetaminophen (Tylenol), ibuprofen (Advil, Motrin), or naproxen (Aleve). Be safe with medicines. Read and follow all instructions on the label. · Try to stay off your feet as much as possible until the pain and swelling go away. · Avoid wearing tight, pointy, or high-heeled shoes. Instead, wear roomy footwear. · Put ice or a cold pack on the area for 10 to 20 minutes at a time. Put a thin cloth between the ice and your skin. · Try massaging your feet. This relaxes the muscles around the nerve.   · If your doctor prescribed special pads or a device to relieve pressure on your toes, use these items as directed. · Until all pain and swelling go away, avoid activities that put pressure on the toes. These include racquet sports and running. When should you call for help? Watch closely for changes in your health, and be sure to contact your doctor if:    · You do not get better as expected. Where can you learn more? Go to http://www.gray.com/  Enter E100 in the search box to learn more about \"Pardo's Neuroma: Care Instructions. \"  Current as of: March 2, 2020               Content Version: 12.6  © 2987-9194 [employer]. Care instructions adapted under license by [employer] (which disclaims liability or warranty for this information). If you have questions about a medical condition or this instruction, always ask your healthcare professional. [address] any warranty or liability for your use of this information.

## 2021-01-07 NOTE — PROGRESS NOTES
HISTORY OF PRESENT ILLNESS  Matt Humphreys is a 79 y.o. female. HPI  Presents with complaints of tingling to left great toe and ball of foot that began several days ago and worsens with weight bearing. Symptoms have been intermittent and she is concerned that it may be related to recent switch from Metoprolol to Carvedilol that occurred in December. Denies any numbness or tingling elsewhere. 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  Denies injury to foot, redness, warmth, swelling. 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    Paroxysmal atrial fibrillation (HCC) I48.0    Dyspnea on exertion R06.00    Kidney lesion N28.9     Past Surgical History:   Procedure Laterality Date    CARDIOVERSION EXTERNAL  9/18/2013         ENDOSCOPY, COLON, DIAGNOSTIC      8/09 neg     Social History     Socioeconomic History    Marital status:      Spouse name: Not on file    Number of children: Not on file    Years of education: Not on file    Highest education level: Not on file   Occupational History    Not on file   Social Needs    Financial resource strain: Not on file    Food insecurity     Worry: Not on file     Inability: Not on file    Transportation needs     Medical: Not on file     Non-medical: Not on file   Tobacco Use    Smoking status: Never Smoker    Smokeless tobacco: Never Used   Substance and Sexual Activity    Alcohol use:  Yes     Alcohol/week: 1.7 standard drinks     Types: 2 Glasses of wine per week     Comment: occasional    Drug use: No    Sexual activity: Not Currently   Lifestyle    Physical activity     Days per week: Not on file     Minutes per session: Not on file    Stress: Not on file   Relationships    Social connections     Talks on phone: Not on file     Gets together: Not on file     Attends Hoahaoism service: Not on file     Active member of club or organization: Not on file     Attends meetings of clubs or organizations: Not on file     Relationship status: Not on file    Intimate partner violence     Fear of current or ex partner: Not on file     Emotionally abused: Not on file     Physically abused: Not on file     Forced sexual activity: Not on file   Other Topics Concern    Not on file   Social History Narrative    Not on file     Family History   Problem Relation Age of Onset    Stroke Father     Stroke Brother      Current Outpatient Medications   Medication Sig    carvediloL (COREG) 3.125 mg tablet TAKE 1 TABLET BY MOUTH TWICE DAILY. STOP METOPROLOL    atorvastatin (LIPITOR) 10 mg tablet TAKE 1/2 TABLET BY MOUTH EVERY DAY    amLODIPine (NORVASC) 5 mg tablet Take 1.5 Tabs by mouth daily.  valsartan (DIOVAN) 320 mg tablet TAKE 1 TABLET BY MOUTH DAILY    apixaban (ELIQUIS) 2.5 mg tablet Take 1 Tab by mouth every twelve (12) hours.  dofetilide (TIKOSYN) 500 mcg capsule Take 250 mcg by mouth two (2) times a day.  B COMPLEX WITH VITAMIN C (ALLBEE/C PO) Take 1 Tab by mouth daily.  acetaminophen (TYLENOL EXTRA STRENGTH) 500 mg tablet Take  by mouth as needed for Pain. No current facility-administered medications for this visit.       Allergies   Allergen Reactions    Ciprofloxacin Hives     Immunization History   Administered Date(s) Administered    (RETIRED) Pneumococcal Vaccine (Unspecified Type) 10/18/2006    Influenza High Dose Vaccine PF 10/04/2017, 10/03/2018, 10/05/2019    Influenza Vaccine 12/09/2013, 01/05/2015, 10/15/2015    Influenza Vaccine (Tri) Adjuvanted (>65 Yrs FLUAD TRI 16456) 10/03/2019    Influenza Vaccine PF 12/29/2015    Influenza Vaccine Split 10/20/2011    Influenza, High-dose, Quadrivalent (>65 Yrs Fluzone High Dose Quad 59267) 09/02/2020    Pneumococcal Conjugate (PCV-13) 10/04/2017, 04/03/2019    Pneumococcal Vaccine (Unspecified Type) 10/18/2006    TDAP Vaccine 01/18/2012    Zoster 07/01/2007    Zoster Vaccine, Live 07/01/2007       Review of Systems   Constitutional: Negative for chills and fever. Respiratory: Negative for cough and shortness of breath. Cardiovascular: Negative for chest pain and palpitations. Musculoskeletal: Positive for joint pain (left foot). Skin: Negative for rash. Neurological: Positive for tingling. BP (!) 142/76 (BP 1 Location: Left arm, BP Patient Position: Sitting) Comment: manual cuff  Pulse 71   Temp 97.5 °F (36.4 °C) (Oral)   Resp 16   Ht 5' 5\" (1.651 m)   Wt 125 lb 6.4 oz (56.9 kg)   LMP  (LMP Unknown)   SpO2 98%   BMI 20.87 kg/m²   Physical Exam  Vitals signs and nursing note reviewed. Constitutional:       Appearance: Normal appearance. HENT:      Head: Normocephalic and atraumatic. Musculoskeletal:      Left foot: Normal range of motion. Feet:    Feet:      Left foot:      Skin integrity: Skin integrity normal. No skin breakdown or erythema. Comments: Tenderness with pressure to left foot as depicted  Neurological:      General: No focal deficit present. Mental Status: She is alert and oriented to person, place, and time. Psychiatric:         Mood and Affect: Mood normal.         Behavior: Behavior normal.         ASSESSMENT and PLAN  Diagnoses and all orders for this visit:    1.  Pardo's neuroma of left foot -- suspect that she is developing recurrence of Pardo's Neuroma and will have her follow up with Podiatry at this point.  -     Corky Wei    2. HTN, goal below 130/80 -- slightly elevated in office today but decreased upon recheck      reviewed diet, exercise and weight control  reviewed medications and side effects in detail

## 2021-02-24 DIAGNOSIS — I48.19 PERSISTENT ATRIAL FIBRILLATION (HCC): ICD-10-CM

## 2021-04-22 DIAGNOSIS — E78.5 DYSLIPIDEMIA: ICD-10-CM

## 2021-04-22 RX ORDER — ATORVASTATIN CALCIUM 10 MG/1
TABLET, FILM COATED ORAL
Qty: 45 TAB | Refills: 1 | Status: SHIPPED | OUTPATIENT
Start: 2021-04-22 | End: 2021-06-01 | Stop reason: SDUPTHER

## 2021-04-23 ENCOUNTER — OFFICE VISIT (OUTPATIENT)
Dept: INTERNAL MEDICINE CLINIC | Age: 81
End: 2021-04-23
Payer: MEDICARE

## 2021-04-23 VITALS
BODY MASS INDEX: 20.79 KG/M2 | TEMPERATURE: 98 F | RESPIRATION RATE: 12 BRPM | HEART RATE: 95 BPM | WEIGHT: 124.8 LBS | DIASTOLIC BLOOD PRESSURE: 82 MMHG | SYSTOLIC BLOOD PRESSURE: 128 MMHG | OXYGEN SATURATION: 98 % | HEIGHT: 65 IN

## 2021-04-23 DIAGNOSIS — Z11.59 ENCOUNTER FOR HEPATITIS C SCREENING TEST FOR LOW RISK PATIENT: ICD-10-CM

## 2021-04-23 DIAGNOSIS — E78.5 DYSLIPIDEMIA: ICD-10-CM

## 2021-04-23 DIAGNOSIS — Z23 ENCOUNTER FOR IMMUNIZATION: ICD-10-CM

## 2021-04-23 DIAGNOSIS — C50.919 MALIGNANT NEOPLASM OF FEMALE BREAST, UNSPECIFIED ESTROGEN RECEPTOR STATUS, UNSPECIFIED LATERALITY, UNSPECIFIED SITE OF BREAST (HCC): ICD-10-CM

## 2021-04-23 DIAGNOSIS — I10 HTN, GOAL BELOW 130/80: ICD-10-CM

## 2021-04-23 DIAGNOSIS — J30.1 SEASONAL ALLERGIC RHINITIS DUE TO POLLEN: ICD-10-CM

## 2021-04-23 DIAGNOSIS — Z12.11 SCREEN FOR COLON CANCER: ICD-10-CM

## 2021-04-23 DIAGNOSIS — I48.0 PAROXYSMAL ATRIAL FIBRILLATION (HCC): ICD-10-CM

## 2021-04-23 DIAGNOSIS — J47.9 BRONCHIECTASIS WITHOUT COMPLICATION (HCC): ICD-10-CM

## 2021-04-23 DIAGNOSIS — Z00.00 MEDICARE ANNUAL WELLNESS VISIT, SUBSEQUENT: Primary | ICD-10-CM

## 2021-04-23 PROCEDURE — 99213 OFFICE O/P EST LOW 20 MIN: CPT | Performed by: NURSE PRACTITIONER

## 2021-04-23 PROCEDURE — 1101F PT FALLS ASSESS-DOCD LE1/YR: CPT | Performed by: NURSE PRACTITIONER

## 2021-04-23 PROCEDURE — G0439 PPPS, SUBSEQ VISIT: HCPCS | Performed by: NURSE PRACTITIONER

## 2021-04-23 PROCEDURE — G8536 NO DOC ELDER MAL SCRN: HCPCS | Performed by: NURSE PRACTITIONER

## 2021-04-23 PROCEDURE — G8754 DIAS BP LESS 90: HCPCS | Performed by: NURSE PRACTITIONER

## 2021-04-23 PROCEDURE — 1090F PRES/ABSN URINE INCON ASSESS: CPT | Performed by: NURSE PRACTITIONER

## 2021-04-23 PROCEDURE — G8432 DEP SCR NOT DOC, RNG: HCPCS | Performed by: NURSE PRACTITIONER

## 2021-04-23 PROCEDURE — G8420 CALC BMI NORM PARAMETERS: HCPCS | Performed by: NURSE PRACTITIONER

## 2021-04-23 PROCEDURE — G8752 SYS BP LESS 140: HCPCS | Performed by: NURSE PRACTITIONER

## 2021-04-23 PROCEDURE — G8427 DOCREV CUR MEDS BY ELIG CLIN: HCPCS | Performed by: NURSE PRACTITIONER

## 2021-04-23 PROCEDURE — G0463 HOSPITAL OUTPT CLINIC VISIT: HCPCS | Performed by: NURSE PRACTITIONER

## 2021-04-23 RX ORDER — BENZONATATE 100 MG/1
100 CAPSULE ORAL
Qty: 30 CAP | Refills: 1 | Status: SHIPPED | OUTPATIENT
Start: 2021-04-23 | End: 2021-04-30

## 2021-04-23 RX ORDER — HYDROCORTISONE 25 MG/G
CREAM TOPICAL
COMMUNITY
Start: 2021-04-13 | End: 2022-08-23 | Stop reason: ALTCHOICE

## 2021-04-23 RX ORDER — CETIRIZINE HCL 10 MG
10 TABLET ORAL
Qty: 30 TAB | Refills: 0 | Status: ON HOLD
Start: 2021-04-23 | End: 2021-05-28 | Stop reason: SDUPTHER

## 2021-04-23 RX ORDER — DIPHENHYDRAMINE HCL 25 MG
25 TABLET ORAL
COMMUNITY
End: 2022-07-26

## 2021-04-23 RX ORDER — FLUTICASONE PROPIONATE 50 MCG
2 SPRAY, SUSPENSION (ML) NASAL DAILY
Qty: 1 BOTTLE | Refills: 2
Start: 2021-04-23

## 2021-04-23 RX ORDER — ZOSTER VACCINE RECOMBINANT, ADJUVANTED 50 MCG/0.5
0.5 KIT INTRAMUSCULAR ONCE
Qty: 0.5 ML | Refills: 0 | Status: SHIPPED | OUTPATIENT
Start: 2021-04-23 | End: 2021-04-23

## 2021-04-23 NOTE — PROGRESS NOTES
This is the Subsequent Medicare Annual Wellness Exam, performed 12 months or more after the Initial AWV or the last Subsequent AWV    I have reviewed the patient's medical history in detail and updated the computerized patient record. Last mammogram: 2020 -- done at [facility]  Colonoscopy 2017 -- done at [facility] 2017; due for repeat in 2022. History of breast cancer and is followed by Oncology at [facility]. Notes some increase in post nasal drainage and cough over the past several weeks that began when pollen counts increased. Wakes up in am with coughing but denies purulent mucous. Has used Nasonex nasal spray in the past but has never tried [place]. History of pulmonary nodules that are being followed with serial CT scans at [facility] at [address]. Sees Dr Pratik Chavarria at [facility] for Paroxysmal Atrial fibrillation and has been stable; on anticoagulation with Eliquis 2.5 mg BID; denies signs of bleeding. On Tikosyn and her dose was decreased at her last visit to [facility]. Hypertension has been stable on current regimen of Valsartan 320 mg daily, Amlodipine 5 mg daily and Coreg 3.125 mg twice daily. Subjective:   Sindy Humphreys is a 79 y.o. female with hyperlipidemia. Cardiovascular risk analysis - 79 y.o. female LDL goal is under 100. ROS: taking medications as instructed, no medication side effects noted, no TIA's, no chest pain on exertion, no dyspnea on exertion, no swelling of ankles. Tolerating meds, no myalgias or other side effects noted  New concerns: tolerating Atorvastatin 5 mg daily; she is not fasting today but will return for fasting labs.        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    Paroxysmal atrial fibrillation (HCC) I48.0    Dyspnea on exertion R06.00    Kidney lesion N28.9     Past Surgical History:   Procedure Laterality Date    CARDIOVERSION EXTERNAL  9/18/2013         ENDOSCOPY, COLON, DIAGNOSTIC      8/09 neg    HX BREAST LUMPECTOMY Right     HX COLONOSCOPY      HX ENDOSCOPY      HX GI      colon resection    HX GI      hemorrhoidectomy    HX ORTHOPAEDIC      Mortons neuroma left foot    HX PELVIC LAPAROSCOPY      ovarian cystectomy    HX TONSILLECTOMY       Social History     Socioeconomic History    Marital status:      Spouse name: Not on file    Number of children: Not on file    Years of education: Not on file    Highest education level: Not on file   Occupational History    Not on file   Social Needs    Financial resource strain: Not on file    Food insecurity     Worry: Not on file     Inability: Not on file    Transportation needs     Medical: Not on file     Non-medical: Not on file   Tobacco Use    Smoking status: Never Smoker    Smokeless tobacco: Never Used   Substance and Sexual Activity    Alcohol use:  Yes     Alcohol/week: 1.7 standard drinks     Types: 2 Glasses of wine per week     Comment: occasional    Drug use: No    Sexual activity: Not Currently   Lifestyle    Physical activity     Days per week: Not on file     Minutes per session: Not on file    Stress: Not on file   Relationships    Social connections     Talks on phone: Not on file     Gets together: Not on file     Attends Christian service: Not on file     Active member of club or organization: Not on file     Attends meetings of clubs or organizations: Not on file     Relationship status: Not on file    Intimate partner violence     Fear of current or ex partner: Not on file     Emotionally abused: Not on file     Physically abused: Not on file     Forced sexual activity: Not on file   Other Topics Concern    Not on file   Social History Narrative    Not on file     Family History   Problem Relation Age of Onset    Stroke Father     Stroke Brother      Current Outpatient Medications   Medication Sig    hydrocortisone (HYTONE) 2.5 % topical cream APPLY TO AFFECTED AREA TWICE A DAY    diphenhydrAMINE (Benadryl Allergy) 25 mg tablet Take 25 mg by mouth every six (6) hours as needed.  varicella-zoster recombinant, PF, (Shingrix, PF,) 50 mcg/0.5 mL susr injection 0.5 mL by IntraMUSCular route once for 1 dose.  fluticasone propionate (FLONASE) 50 mcg/actuation nasal spray 2 Sprays by Both Nostrils route daily.  cetirizine (ZYRTEC) 10 mg tablet Take 1 Tab by mouth daily as needed for Allergies.  benzonatate (TESSALON) 100 mg capsule Take 1 Cap by mouth three (3) times daily as needed for Cough for up to 7 days.  atorvastatin (LIPITOR) 10 mg tablet TAKE 1/2 TABLET BY MOUTH EVERY DAY    valsartan (DIOVAN) 320 mg tablet TAKE 1 TABLET BY MOUTH DAILY    apixaban (Eliquis) 2.5 mg tablet Take 1 Tab by mouth two (2) times a day.  carvediloL (COREG) 3.125 mg tablet TAKE 1 TABLET BY MOUTH TWICE DAILY. STOP METOPROLOL    amLODIPine (NORVASC) 5 mg tablet Take 1.5 Tabs by mouth daily. (Patient taking differently: Take 5 mg by mouth daily.)    acetaminophen (TYLENOL EXTRA STRENGTH) 500 mg tablet Take  by mouth as needed for Pain.  dofetilide (TIKOSYN) 500 mcg capsule Take 250 mcg by mouth two (2) times a day.  B COMPLEX WITH VITAMIN C (ALLBEE/C PO) Take 1 Tab by mouth daily. No current facility-administered medications for this visit.       Allergies   Allergen Reactions    Ciprofloxacin Hives     Immunization History   Administered Date(s) Administered    (RETIRED) Pneumococcal Vaccine (Unspecified Type) 10/18/2006    COVID-19, PFIZER, MRNA, LNP-S, PF, 30MCG/0.3ML DOSE 01/22/2021, 02/11/2021    Influenza High Dose Vaccine PF 10/04/2017, 10/03/2018, 10/05/2019    Influenza Vaccine 12/09/2013, 01/05/2015, 10/15/2015    Influenza Vaccine (Tri) Adjuvanted (>65 Yrs FLUAD TRI 52052) 10/03/2019    Influenza Vaccine PF 12/29/2015    Influenza Vaccine Split 10/20/2011    Influenza, High-dose, Quadrivalent (>65 Yrs Fluzone High Dose Quad 20965) 09/02/2020    Pneumococcal Conjugate (PCV-13) 10/04/2017, 04/03/2019    Pneumococcal Vaccine (Unspecified Type) 10/18/2006    TDAP Vaccine 01/18/2012    Zoster 07/01/2007    Zoster Vaccine, Live 07/01/2007     Review of Systems   Constitutional: Negative for chills, fever and malaise/fatigue. HENT: Positive for congestion. Negative for sinus pain and sore throat. Respiratory: Positive for cough and sputum production. Negative for shortness of breath and wheezing. Cardiovascular: Negative for chest pain, palpitations and leg swelling. Gastrointestinal: Negative for abdominal pain, blood in stool, constipation, diarrhea, nausea and vomiting. Genitourinary: Negative for dysuria, frequency and hematuria. Musculoskeletal: Negative for myalgias. Neurological: Negative for tingling and headaches. Endo/Heme/Allergies: Positive for environmental allergies. Psychiatric/Behavioral: Negative for depression. The patient is nervous/anxious. /82 (BP 1 Location: Left upper arm, BP Patient Position: Sitting, BP Cuff Size: Adult)   Pulse 95   Temp 98 °F (36.7 °C) (Oral)   Resp 12   Ht 5' 5\" (1.651 m)   Wt 124 lb 12.8 oz (56.6 kg)   LMP  (LMP Unknown)   SpO2 98%   BMI 20.77 kg/m²   Physical Exam  Vitals signs and nursing note reviewed. Constitutional:       Appearance: Normal appearance. HENT:      Head: Normocephalic and atraumatic.       Right Ear: Tympanic membrane, ear canal and external ear normal.      Left Ear: Tympanic membrane, ear canal and external ear normal.      Nose: Congestion present. Mouth/Throat:      Mouth: Mucous membranes are moist.      Pharynx: Oropharynx is clear. Neck:      Musculoskeletal: Normal range of motion and neck supple. Cardiovascular:      Rate and Rhythm: Normal rate and regular rhythm. Pulmonary:      Effort: Pulmonary effort is normal.      Breath sounds: Normal breath sounds. Abdominal:      General: Bowel sounds are normal.      Palpations: Abdomen is soft. Tenderness: There is no abdominal tenderness. Musculoskeletal: Normal range of motion. Skin:     General: Skin is warm and dry. Neurological:      General: No focal deficit present. Mental Status: She is alert and oriented to person, place, and time. Psychiatric:         Mood and Affect: Mood normal.         Behavior: Behavior normal.             Assessment/Plan   Education and counseling provided:  Are appropriate based on today's review and evaluation  Colorectal cancer screening tests  Cardiovascular screening blood test    1. Medicare annual wellness visit, subsequent    2. Encounter for immunization  -     varicella-zoster recombinant, PF, (Shingrix, PF,) 50 mcg/0.5 mL susr injection; 0.5 mL by IntraMUSCular route once for 1 dose., Print, Disp-0.5 mL, R-0    3. Encounter for hepatitis C screening test for low risk patient  -     HEPATITIS C AB; Future    4. Screen for colon cancer - she is due for repeat colonoscopy in 2022; will send FIT test  -     OCCULT BLOOD IMMUNOASSAY,DIAGNOSTIC    5. Malignant neoplasm of female breast, unspecified estrogen receptor status, unspecified laterality, unspecified site of breast ([facility] [address] -- followed by Oncology at [facility]  -     [address] DIFF; Future    6. Seasonal allergic rhinitis due to pollen  -     fluticasone propionate (FLONASE) 50 mcg/actuation nasal spray; 2 Sprays by Both Nostrils route daily. , No Print, Disp-1 Bottle, R-2  -     cetirizine (ZYRTEC) 10 mg tablet; Take 1 Tab by mouth daily as needed for Allergies. , No Print, Disp-30 Tab, R-0  -     benzonatate (TESSALON) 100 mg capsule; Take 1 Cap by mouth three (3) times daily as needed for Cough for up to 7 days. , Normal, Disp-30 Cap, R-1    7. Bronchiectasis without complication ([address]-- currently stable; followed by Pulmonary at [address]     8. Paroxysmal atrial fibrillation (HCC) -- has been stable; followed by Cardiology Dr Tin Dye at [facility]; on chronic anticoagulation with Eliquis BID.  -     TSH 3RD GENERATION; Future  -     CBC WITH AUTOMATED DIFF; Future    9. HTN, goal below 130/80 -- stable on current regimen  -     METABOLIC PANEL, COMPREHENSIVE; Future    10. Dyslipidemia  -     METABOLIC PANEL, COMPREHENSIVE; Future  -     LIPID PANEL; Future       Depression Risk Factor Screening     3 most recent PHQ Screens 4/23/2021   Little interest or pleasure in doing things Not at all   Feeling down, depressed, irritable, or hopeless Several days   Total Score PHQ 2 1       Alcohol Risk Screen    Do you average more than 1 drink per night or more than 7 drinks a week:  No    On any one occasion in the past three months have you have had more than 3 drinks containing alcohol:  No        Functional Ability and Level of Safety    Hearing: Hearing is good. Activities of Daily Living: The home contains: grab bars  Patient does total self care      Ambulation: with no difficulty     Fall Risk:  Fall Risk Assessment, last 12 mths 10/26/2020   Able to walk? Yes   Fall in past 12 months?  No      Abuse Screen:  Patient is not abused       Cognitive Screening    Has your family/caregiver stated any concerns about your memory: no       Health Maintenance Due     Health Maintenance Due   Topic Date Due    Hepatitis C Screening  Never done    Shingrix Vaccine Age 49> (1 of 2) Never done       Patient Care Team   Patient Care Team:  Shanice Houser MD as PCP - General (Internal Medicine)  Shanice Houser MD as PCP - [facility] Empaneled Provider  Sae Paez MD (Cardiology)  Ariadna Tellez MD (Pulmonary Disease)    History     Patient Active Problem List   Diagnosis Code    Breast cancer ([facility] [address] C50.919    DVT of leg (deep venous thrombosis) (HCC) I82.409    Rectal polyp K62.1    Osteoporosis M81.0    Mixed hyperlipidemia E78.2    Allergic rhinitis J30.9    Lung nodule R91.1    DNR (do not resuscitate) Z66    Hypertension, essential, benign I10    PVC (premature ventricular contraction) I49.3    Fatigue R53.83    Hyperkalemia E87.5    Advanced care planning/counseling discussion Z70.80    Stroke risk Z91.89    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 Tamoxifen     Essential hypertension     Lung nodule 10/1/2009    Mixed hyperlipidemia 3/18/2009    Paroxysmal A-fib (HCC)     Paroxysmal atrial fibrillation ([address] 11/14/2019    Dr Aníbal Duncan Rectal polyp 3/18/2009    -adenocarcinoma Stage 1       Past Surgical History:   Procedure Laterality Date    CARDIOVERSION EXTERNAL  9/18/2013         ENDOSCOPY, COLON, DIAGNOSTIC      8/09 neg    HX BREAST LUMPECTOMY Right     HX COLONOSCOPY      HX ENDOSCOPY      HX GI      colon resection    HX GI      hemorrhoidectomy    HX ORTHOPAEDIC      Mortons neuroma left foot    HX PELVIC LAPAROSCOPY      ovarian cystectomy    HX TONSILLECTOMY       Current Outpatient Medications   Medication Sig Dispense Refill    hydrocortisone (HYTONE) 2.5 % topical cream APPLY TO AFFECTED AREA TWICE A DAY      diphenhydrAMINE (Benadryl Allergy) 25 mg tablet Take 25 mg by mouth every six (6) hours as needed.       varicella-zoster recombinant, PF, (Shingrix, PF,) 50 mcg/0.5 mL susr injection 0.5 mL by IntraMUSCular route once for 1 dose. 0.5 mL 0    fluticasone propionate (FLONASE) 50 mcg/actuation nasal spray 2 Sprays by Both Nostrils route daily. 1 Bottle 2    cetirizine (ZYRTEC) 10 mg tablet Take 1 Tab by mouth daily as needed for Allergies. 30 Tab 0    benzonatate (TESSALON) 100 mg capsule Take 1 Cap by mouth three (3) times daily as needed for Cough for up to 7 days. 30 Cap 1    atorvastatin (LIPITOR) 10 mg tablet TAKE 1/2 TABLET BY MOUTH EVERY DAY 45 Tab 1    valsartan (DIOVAN) 320 mg tablet TAKE 1 TABLET BY MOUTH DAILY 90 Tab 0    apixaban (Eliquis) 2.5 mg tablet Take 1 Tab by mouth two (2) times a day. 180 Tab 3    carvediloL (COREG) 3.125 mg tablet TAKE 1 TABLET BY MOUTH TWICE DAILY. STOP METOPROLOL      amLODIPine (NORVASC) 5 mg tablet Take 1.5 Tabs by mouth daily. (Patient taking differently: Take 5 mg by mouth daily.) 135 Tab 1    acetaminophen (TYLENOL EXTRA STRENGTH) 500 mg tablet Take  by mouth as needed for Pain.  dofetilide (TIKOSYN) 500 mcg capsule Take 250 mcg by mouth two (2) times a day.  B COMPLEX WITH VITAMIN C (ALLBEE/C PO) Take 1 Tab by mouth daily. Allergies   Allergen Reactions    Ciprofloxacin Hives       Family History   Problem Relation Age of Onset    Stroke Father     Stroke Brother      Social History     Tobacco Use    Smoking status: Never Smoker    Smokeless tobacco: Never Used   Substance Use Topics    Alcohol use:  Yes     Alcohol/week: 1.7 standard drinks     Types: 2 Glasses of wine per week     Comment: occasional         Nando Early NP

## 2021-04-23 NOTE — PATIENT INSTRUCTIONS
Medicare Wellness Visit, Female     The best way to live healthy is to have a lifestyle where you eat a well-balanced diet, exercise regularly, limit alcohol use, and quit all forms of tobacco/nicotine, if applicable. Regular preventive services are another way to keep healthy. Preventive services (vaccines, screening tests, monitoring & exams) can help personalize your care plan, which helps you manage your own care. Screening tests can find health problems at the earliest stages, when they are easiest to treat. [facility] follows the current, evidence-based guidelines published by the [facility] Nathaniel Almeida ([facility]) when recommending preventive services for our patients. Because we follow these guidelines, sometimes recommendations change over time as research supports it. (For example, mammograms used to be recommended annually. Even though Medicare will still pay for an annual mammogram, the newer guidelines recommend a mammogram every two years for women of average risk). Of course, you and your doctor may decide to screen more often for some diseases, based on your risk and your co-morbidities (chronic disease you are already diagnosed with). Preventive services for you include:  - Medicare offers their members a free annual wellness visit, which is time for you and your primary care provider to discuss and plan for your preventive service needs. Take advantage of this benefit every year!  -All adults over the age of 72 should receive the recommended pneumonia vaccines. Current USPSTF guidelines recommend a series of two vaccines for the best pneumonia protection.   -All adults should have a flu vaccine yearly and a tetanus vaccine every 10 years.   -All adults age 48 and older should receive the shingles vaccines (series of two vaccines).       -All adults age 38-68 who are overweight should have a diabetes screening test once every three years.   -All adults born between [birth year] and 1964 should be screened once for Hepatitis C.  -Other screening tests and preventive services for persons with diabetes include: an eye exam to screen for diabetic retinopathy, a kidney function test, a foot exam, and stricter control over your cholesterol.   -Cardiovascular screening for adults with routine risk involves an electrocardiogram (ECG) at intervals determined by your doctor.   -Colorectal cancer screenings should be done for adults age 54-65 with no increased risk factors for colorectal cancer. There are a number of acceptable methods of screening for this type of cancer. Each test has its own benefits and drawbacks. Discuss with your doctor what is most appropriate for you during your annual wellness visit. The different tests include: colonoscopy (considered the best screening method), a fecal occult blood test, a fecal DNA test, and sigmoidoscopy.    -A bone mass density test is recommended when a woman turns 65 to screen for osteoporosis. This test is only recommended one time, as a screening. Some providers will use this same test as a disease monitoring tool if you already have osteoporosis. -Breast cancer screenings are recommended every other year for women of normal risk, age 54-69.  -Cervical cancer screenings for women over age 72 are only recommended with certain risk factors. Here is a list of your current Health Maintenance items (your personalized list of preventive services) with a due date:  Health Maintenance Due   Topic Date Due    Hepatitis C Test  Never done    Shingles Vaccine (1 of 2) Never done            Allergies: Care Instructions  Your Care Instructions     Allergies occur when your body's defense system (immune system) overreacts to certain substances. The immune system treats a harmless substance as if it were a harmful germ or virus. Many things can make this happen.  These include pollens, medicine, food, dust, animal dander, and mold.  Allergies can be mild or severe. Mild allergies can be managed with home treatment. But medicine may be needed to prevent problems. Managing your allergies is an important part of staying healthy. Your doctor may suggest that you have allergy testing to help find out what is causing your allergies. Severe allergies can cause reactions that affect your whole body (anaphylactic reactions). Your doctor may prescribe a shot of epinephrine to carry with you in case you have a severe reaction. Learn how to give yourself the shot and keep it with you at all times. Make sure it is not . Follow-up care is a key part of your treatment and safety. Be sure to make and go to all appointments, and call your doctor if you are having problems. It's also a good idea to know your test results and keep a list of the medicines you take. How can you care for yourself at home? · If you have been told by your doctor that dust or dust mites are causing your allergy, decrease the dust around your bed:  ? Wash sheets, pillowcases, and other bedding in hot water every week. ? Use dust-proof covers for pillows, duvets, and mattresses. Avoid plastic covers because they tear easily and do not \"breathe. \" Wash as instructed on the label. ? Do not use any blankets and pillows that you do not need. ? Use blankets that you can wash in your washing machine. ? Consider removing drapes and carpets, which attract and hold dust, from your bedroom. · If you are allergic to house dust and mites, do not use home humidifiers. Your doctor can suggest ways you can control dust and mites. · Look for signs of cockroaches. Cockroaches cause allergic reactions. Use cockroach baits to get rid of them. Then, clean your home well. Cockroaches like areas where grocery bags, newspapers, empty bottles, or cardboard boxes are stored. Do not keep these inside your home, and keep trash and food containers sealed.  Seal off any spots where cockroaches might enter your home. · If you are allergic to mold, get rid of furniture, rugs, and drapes that smell musty. Check for mold in the bathroom. · If you are allergic to outdoor pollen or mold spores, use air-conditioning. Change or clean all filters every month. Keep windows closed. · If you are allergic to pollen, stay inside when pollen counts are high. Use a vacuum  with a HEPA filter or a double-thickness filter at least two times each week. · Stay inside when air pollution is bad. Avoid paint fumes, perfumes, and other strong odors. · Avoid conditions that make your allergies worse. Stay away from smoke. Do not smoke or let anyone else smoke in your house. Do not use fireplaces or wood-burning stoves. · If you are allergic to your pets, change the air filter in your furnace every month. Use high-efficiency filters. · If you are allergic to pet dander, keep pets outside or out of your bedroom. Old carpet and cloth furniture can hold a lot of animal dander. You may need to replace them. When should you call for help? Give an epinephrine shot if:    · You think you are having a severe allergic reaction.     · You have symptoms in more than one body area, such as mild nausea and an itchy mouth. After giving an epinephrine shot call 911, even if you feel better. Call 911 if:    · You have symptoms of a severe allergic reaction. These may include:  ? Sudden raised, red areas (hives) all over your body. ? Swelling of the throat, mouth, lips, or tongue. ? Trouble breathing. ? Passing out (losing consciousness). Or you may feel very lightheaded or suddenly feel weak, confused, or restless.     · You have been given an epinephrine shot, even if you feel better. Call your doctor now or seek immediate medical care if:    · You have symptoms of an allergic reaction, such as:  ? A rash or hives (raised, red areas on the skin). ? Itching. ? Swelling. ? Belly pain, nausea, or vomiting.    Watch closely for changes in your health, and be sure to contact your doctor if:    · You do not get better as expected. Where can you learn more? Go to http://www.[employer].com/  Enter W171 in the search box to learn more about \"Allergies: Care Instructions. \"  Current as of: November 6, 2020               Content Version: 12.8  © 2006-2021 [employer]. Care instructions adapted under license by [employer] (which disclaims liability or warranty for this information). If you have questions about a medical condition or this instruction, always ask your healthcare professional. [place] [address] any warranty or liability for your use of this information. Recombinant Zoster (Shingles) Vaccine: What You Need to Know  Why get vaccinated? Recombinant zoster (shingles) vaccine can prevent shingles. Shingles (also called herpes zoster, or just zoster) is a painful skin rash, usually with blisters. In addition to the rash, shingles can cause fever, headache, chills, or upset stomach. More rarely, shingles can lead to pneumonia, hearing problems, blindness, brain inflammation (encephalitis), or death. The most common complication of shingles is long-term nerve pain called postherpetic neuralgia (PHN). PHN occurs in the areas where the shingles rash was, even after the rash clears up. It can last for months or years after the rash goes away. The pain from PHN can be severe and debilitating. About 10 to 18% of people who get shingles will experience PHN. The risk of PHN increases with age. An older adult with shingles is more likely to develop PHN and have longer lasting and more severe pain than a younger person with shingles. Shingles is caused by the varicella zoster virus, the same virus that causes chickenpox. After you have chickenpox, the virus stays in your body and can cause shingles later in life.  Shingles cannot be passed from one person to another, but the virus that causes shingles can spread and cause chickenpox in someone who had never had chickenpox or received chickenpox vaccine. Recombinant shingles vaccine  Recombinant shingles vaccine provides strong protection against shingles. By preventing shingles, recombinant shingles vaccine also protects against PHN. Recombinant shingles vaccine is the preferred vaccine for the prevention of shingles. However, a different vaccine, live shingles vaccine, may be used in some circumstances. The recombinant shingles vaccine is recommended for adults 50 years and older without serious immune problems. It is given as a two-dose series. This vaccine is also recommended for people who have already gotten another type of shingles vaccine, the live shingles vaccine. There is no live virus in this vaccine. Shingles vaccine may be given at the same time as other vaccines. Talk with your health care provider  Tell your vaccine provider if the person getting the vaccine:  · Has had an allergic reaction after a previous dose of recombinant shingles vaccine, or has any severe, life-threatening allergies. · Is pregnant or breastfeeding. · Is currently experiencing an episode of shingles. In some cases, your health care provider may decide to postpone shingles vaccination to a future visit. People with minor illnesses, such as a cold, may be vaccinated. People who are moderately or severely ill should usually wait until they recover before getting recombinant shingles vaccine. Your health care provider can give you more information. Risks of a vaccine reaction  · A sore arm with mild or moderate pain is very common after recombinant shingles vaccine, affecting about 80% of vaccinated people. Redness and swelling can also happen at the site of the injection.   · Tiredness, muscle pain, headache, shivering, fever, stomach pain, and nausea happen after vaccination in more than half of people who receive recombinant shingles vaccine. In clinical trials, about 1 out of 6 people who got recombinant zoster vaccine experienced side effects that prevented them from doing regular activities. Symptoms usually went away on their own in 2 to 3 days. You should still get the second dose of recombinant zoster vaccine even if you had one of these reactions after the first dose. People sometimes faint after medical procedures, including vaccination. Tell your provider if you feel dizzy or have vision changes or ringing in the ears. As with any medicine, there is a very remote chance of a vaccine causing a severe allergic reaction, other serious injury, or death. What if there is a serious problem? An allergic reaction could occur after the vaccinated person leaves the clinic. If you see signs of a severe allergic reaction (hives, swelling of the face and throat, difficulty breathing, a fast heartbeat, dizziness, or weakness), call 9-1-1 and get the person to the nearest hospital.  For other signs that concern you, call your health care provider. Adverse reactions should be reported to the Vaccine Adverse Event Reporting System (VAERS). Your health care provider will usually file this report, or you can do it yourself. Visit the VAERS website at www.vaers. [facility].gov or call 9-116.917.3494. VAERS is only for reporting reactions, and VAERS staff do not give medical advice. How can I learn more? · Ask your health care provider. · Call your local or state health department. · Contact the Centers for Disease Control and Prevention (CDC):  ? Call 3-491.771.7307 (1-800-CDC-INFO) or  ? Visit CDC's website at www.cdc.gov/vaccines  Vaccine Information Statement  Recombinant Zoster Vaccine  10/30/2019  [facility] and [facility] for Disease Control and Prevention  Many Vaccine Information Statements are available in Beninese and other languages. See www.immunize.org/vis.   [address] están disponibles en Español y en muchos otros idiomas. [employer]ite [employer].si   Care instructions adapted under license by [employer] (which disclaims liability or warranty for this information). If you have questions about a medical condition or this instruction, always ask your healthcare professional. [address] any warranty or liability for your use of this information. Well Visit, Over 72: Care Instructions  Overview     Well visits can help you stay healthy. Your doctor has checked your overall health and may have suggested ways to take good care of yourself. Your doctor also may have recommended tests. At home, you can help prevent illness with healthy eating, regular exercise, and other steps. Follow-up care is a key part of your treatment and safety. Be sure to make and go to all appointments, and call your doctor if you are having problems. It's also a good idea to know your test results and keep a list of the medicines you take. How can you care for yourself at home? · Get screening tests that you and your doctor decide on. Screening helps find diseases before any symptoms appear. · Eat healthy foods. Choose fruits, vegetables, whole grains, protein, and low-fat dairy foods. Limit fat, especially saturated fat. Reduce salt in your diet. · Limit alcohol. If you are a man, have no more than 2 drinks a day or 14 drinks a week. If you are a woman, have no more than 1 drink a day or 7 drinks a week. Since alcohol affects older adults differently, you may want to limit alcohol even more. Or you may not want to drink at all. · Get at least 30 minutes of exercise on most days of the week. Walking is a good choice. You also may want to do other activities, such as running, swimming, cycling, or playing tennis or team sports. · Reach and stay at a healthy weight.  This will lower your risk for many problems, such as obesity, diabetes, heart disease, and high blood pressure. · Do not smoke. Smoking can make health problems worse. If you need help quitting, talk to your doctor about stop-smoking programs and medicines. These can increase your chances of quitting for good. · Care for your mental health. It is easy to get weighed down by worry and stress. Learn strategies to manage stress, like deep breathing and mindfulness, and stay connected with your family and community. If you find you often feel sad or hopeless, talk with your doctor. Treatment can help. · Talk to your doctor about whether you have any risk factors for sexually transmitted infections (STIs). You can help prevent STIs if you wait to have sex with a new partner (or partners) until you've each been tested for STIs. It also helps if you use condoms (male or female condoms) and if you limit your sex partners to one person who only has sex with you. Vaccines are available for some STIs. · If you think you may have a problem with alcohol or drug use, talk to your doctor. This includes prescription medicines (such as amphetamines and opioids) and illegal drugs (such as cocaine and methamphetamine). Your doctor can help you figure out what type of treatment is best for you. · Protect your skin from too much sun. When you're outdoors from 10 a.m. to 4 p.m., stay in the shade or cover up with clothing and a hat with a wide brim. Wear sunglasses that block UV rays. Even when it's cloudy, put broad-spectrum sunscreen (SPF 30 or higher) on any exposed skin. · See a dentist one or two times a year for checkups and to have your teeth cleaned. · Wear a seat belt in the car. When should you call for help? Watch closely for changes in your health, and be sure to contact your doctor if you have any problems or symptoms that concern you. Where can you learn more?   Go to http://www.gray.com/  Enter P248 in the search box to learn more about \"Well Visit, Over 65: Care Instructions. \"  Current as of: May 27, 2020               Content Version: 12.8  © 1034-2826 Healthwise, [facility]. Care instructions adapted under license by [employer] (which disclaims liability or warranty for this information). If you have questions about a medical condition or this instruction, always ask your healthcare professional. [place] [address] any warranty or liability for your use of this information.

## 2021-05-01 LAB
ALBUMIN SERPL-MCNC: 4.6 G/DL (ref 3.7–4.7)
ALBUMIN/GLOB SERPL: 1.9 {RATIO} (ref 1.2–2.2)
ALP SERPL-CCNC: 124 IU/L (ref 39–117)
ALT SERPL-CCNC: 37 IU/L (ref 0–32)
AST SERPL-CCNC: 37 IU/L (ref 0–40)
BASOPHILS # BLD AUTO: 0.1 X10E3/UL (ref 0–0.2)
BASOPHILS NFR BLD AUTO: 1 %
BILIRUB SERPL-MCNC: 0.6 MG/DL (ref 0–1.2)
BUN SERPL-MCNC: 16 MG/DL (ref 8–27)
BUN/CREAT SERPL: 19 (ref 12–28)
CALCIUM SERPL-MCNC: 9.5 MG/DL (ref 8.7–10.3)
CHLORIDE SERPL-SCNC: 104 MMOL/L (ref 96–106)
CHOLEST SERPL-MCNC: 139 MG/DL (ref 100–199)
CO2 SERPL-SCNC: 22 MMOL/L (ref 20–29)
CREAT SERPL-MCNC: 0.84 MG/DL (ref 0.57–1)
EOSINOPHIL # BLD AUTO: 0.1 X10E3/UL (ref 0–0.4)
EOSINOPHIL NFR BLD AUTO: 1 %
ERYTHROCYTE [DISTWIDTH] IN BLOOD BY AUTOMATED COUNT: 12.4 % (ref 11.7–15.4)
GLOBULIN SER CALC-MCNC: 2.4 G/DL (ref 1.5–4.5)
GLUCOSE SERPL-MCNC: 97 MG/DL (ref 65–99)
HCT VFR BLD AUTO: 39.5 % (ref 34–46.6)
HCV AB S/CO SERPL IA: <0.1 S/CO RATIO (ref 0–0.9)
HDLC SERPL-MCNC: 63 MG/DL
HEMOCCULT STL QL IA: NEGATIVE
HGB BLD-MCNC: 12.9 G/DL (ref 11.1–15.9)
IMM GRANULOCYTES # BLD AUTO: 0 X10E3/UL (ref 0–0.1)
IMM GRANULOCYTES NFR BLD AUTO: 0 %
IMP & REVIEW OF LAB RESULTS: NORMAL
LDLC SERPL CALC-MCNC: 62 MG/DL (ref 0–99)
LYMPHOCYTES # BLD AUTO: 1.6 X10E3/UL (ref 0.7–3.1)
LYMPHOCYTES NFR BLD AUTO: 21 %
MCH RBC QN AUTO: 30.6 PG (ref 26.6–33)
MCHC RBC AUTO-ENTMCNC: 32.7 G/DL (ref 31.5–35.7)
MCV RBC AUTO: 94 FL (ref 79–97)
MONOCYTES # BLD AUTO: 0.6 X10E3/UL (ref 0.1–0.9)
MONOCYTES NFR BLD AUTO: 9 %
NEUTROPHILS # BLD AUTO: 5 X10E3/UL (ref 1.4–7)
NEUTROPHILS NFR BLD AUTO: 68 %
PLATELET # BLD AUTO: 217 X10E3/UL (ref 150–450)
POTASSIUM SERPL-SCNC: 4.3 MMOL/L (ref 3.5–5.2)
PROT SERPL-MCNC: 7 G/DL (ref 6–8.5)
RBC # BLD AUTO: 4.22 X10E6/UL (ref 3.77–5.28)
SODIUM SERPL-SCNC: 140 MMOL/L (ref 134–144)
TRIGL SERPL-MCNC: 68 MG/DL (ref 0–149)
TSH SERPL DL<=0.005 MIU/L-ACNC: 1.58 UIU/ML (ref 0.45–4.5)
VLDLC SERPL CALC-MCNC: 14 MG/DL (ref 5–40)
WBC # BLD AUTO: 7.4 X10E3/UL (ref 3.4–10.8)

## 2021-05-10 ENCOUNTER — TELEPHONE (OUTPATIENT)
Dept: INTERNAL MEDICINE CLINIC | Age: 81
End: 2021-05-10

## 2021-05-10 NOTE — TELEPHONE ENCOUNTER
V/m left for patient notifying her message was received. Advised PCP is not specifically trained to review radiology films & if she needs a 2nd opinion can have a trained radiologist look at her films. Advised PCP is happy to do a visit once she has completed her antibiotic course. Office number left if patient has additional questions or concerns.

## 2021-05-10 NOTE — TELEPHONE ENCOUNTER
----- Message from María Elena Maravilla sent at 5/10/2021  8:05 AM EDT -----  Regarding: /telephone  Contact: [phone]  General Message/Vendor Calls    Caller's first and last name: N/A      Reason for call: She was dx with pneumonia over the weekend and is taking abx. She would like someone to look at the xray to confirm that is what it is.        Callback required yes/no and why: Yes      Best contact number(s): [phone]      Details to clarify the request: N/A      María Elena Maravilla

## 2021-05-25 ENCOUNTER — APPOINTMENT (OUTPATIENT)
Dept: GENERAL RADIOLOGY | Age: 81
DRG: 308 | End: 2021-05-25
Attending: EMERGENCY MEDICINE
Payer: MEDICARE

## 2021-05-25 ENCOUNTER — HOSPITAL ENCOUNTER (INPATIENT)
Age: 81
LOS: 3 days | Discharge: HOME | DRG: 308 | End: 2021-05-28
Attending: EMERGENCY MEDICINE | Admitting: FAMILY MEDICINE
Payer: MEDICARE

## 2021-05-25 ENCOUNTER — APPOINTMENT (OUTPATIENT)
Dept: NON INVASIVE DIAGNOSTICS | Age: 81
DRG: 308 | End: 2021-05-25
Attending: INTERNAL MEDICINE
Payer: MEDICARE

## 2021-05-25 DIAGNOSIS — J30.1 SEASONAL ALLERGIC RHINITIS DUE TO POLLEN: ICD-10-CM

## 2021-05-25 DIAGNOSIS — I48.91 ATRIAL FIBRILLATION, UNSPECIFIED TYPE (HCC): ICD-10-CM

## 2021-05-25 DIAGNOSIS — I48.91 ATRIAL FIBRILLATION WITH RVR (HCC): Primary | ICD-10-CM

## 2021-05-25 DIAGNOSIS — I50.9 CONGESTIVE HEART FAILURE, UNSPECIFIED HF CHRONICITY, UNSPECIFIED HEART FAILURE TYPE (HCC): ICD-10-CM

## 2021-05-25 DIAGNOSIS — I48.19 PERSISTENT ATRIAL FIBRILLATION (HCC): ICD-10-CM

## 2021-05-25 DIAGNOSIS — R79.89 ELEVATED BRAIN NATRIURETIC PEPTIDE (BNP) LEVEL: ICD-10-CM

## 2021-05-25 LAB
ALBUMIN SERPL-MCNC: 3.9 G/DL (ref 3.5–5)
ALBUMIN/GLOB SERPL: 1.1 {RATIO} (ref 1.1–2.2)
ALP SERPL-CCNC: 129 U/L (ref 45–117)
ALT SERPL-CCNC: 38 U/L (ref 12–78)
ANION GAP SERPL CALC-SCNC: 10 MMOL/L (ref 5–15)
ANION GAP SERPL CALC-SCNC: 4 MMOL/L (ref 5–15)
APPEARANCE UR: CLEAR
AST SERPL-CCNC: 30 U/L (ref 15–37)
ATRIAL RATE: 108 BPM
ATRIAL RATE: 113 BPM
ATRIAL RATE: 115 BPM
ATRIAL RATE: 122 BPM
BACTERIA URNS QL MICRO: NEGATIVE /HPF
BASOPHILS # BLD: 0 K/UL (ref 0–0.1)
BASOPHILS # BLD: 0 K/UL (ref 0–0.1)
BASOPHILS NFR BLD: 1 % (ref 0–1)
BASOPHILS NFR BLD: 1 % (ref 0–1)
BILIRUB SERPL-MCNC: 0.6 MG/DL (ref 0.2–1)
BILIRUB UR QL: NEGATIVE
BNP SERPL-MCNC: 7480 PG/ML
BNP SERPL-MCNC: 7597 PG/ML
BUN SERPL-MCNC: 12 MG/DL (ref 6–20)
BUN SERPL-MCNC: 14 MG/DL (ref 6–20)
BUN/CREAT SERPL: 16 (ref 12–20)
BUN/CREAT SERPL: 18 (ref 12–20)
CALCIUM SERPL-MCNC: 8.8 MG/DL (ref 8.5–10.1)
CALCIUM SERPL-MCNC: 8.9 MG/DL (ref 8.5–10.1)
CALCULATED R AXIS, ECG10: -59 DEGREES
CALCULATED R AXIS, ECG10: -59 DEGREES
CALCULATED R AXIS, ECG10: -67 DEGREES
CALCULATED R AXIS, ECG10: -67 DEGREES
CALCULATED T AXIS, ECG11: 101 DEGREES
CALCULATED T AXIS, ECG11: 176 DEGREES
CALCULATED T AXIS, ECG11: 90 DEGREES
CALCULATED T AXIS, ECG11: 91 DEGREES
CHLORIDE SERPL-SCNC: 101 MMOL/L (ref 97–108)
CHLORIDE SERPL-SCNC: 103 MMOL/L (ref 97–108)
CK SERPL-CCNC: 47 U/L (ref 26–192)
CK SERPL-CCNC: 58 U/L (ref 26–192)
CK SERPL-CCNC: 60 U/L (ref 26–192)
CO2 SERPL-SCNC: 21 MMOL/L (ref 21–32)
CO2 SERPL-SCNC: 28 MMOL/L (ref 21–32)
COLOR UR: ABNORMAL
COMMENT, HOLDF: NORMAL
CREAT SERPL-MCNC: 0.76 MG/DL (ref 0.55–1.02)
CREAT SERPL-MCNC: 0.76 MG/DL (ref 0.55–1.02)
DIAGNOSIS, 93000: NORMAL
DIFFERENTIAL METHOD BLD: ABNORMAL
DIFFERENTIAL METHOD BLD: NORMAL
ECHO AO ROOT DIAM: 2.83 CM
ECHO AV AREA PEAK VELOCITY: 2.82 CM2
ECHO AV AREA/BSA PEAK VELOCITY: 1.7 CM2/M2
ECHO AV PEAK GRADIENT: 5.11 MMHG
ECHO AV PEAK VELOCITY: 101.65 CM/S
ECHO EST RA PRESSURE: 10 MMHG
ECHO LA AREA 4C: 22.15 CM2
ECHO LA MAJOR AXIS: 3.93 CM
ECHO LA MINOR AXIS: 2.43 CM
ECHO LA VOL 2C: 54.97 ML (ref 22–52)
ECHO LA VOL 4C: 68.92 ML (ref 22–52)
ECHO LA VOL BP: 63.58 ML (ref 22–52)
ECHO LA VOL/BSA BIPLANE: 39.25 ML/M2 (ref 16–28)
ECHO LA VOLUME INDEX A2C: 33.93 ML/M2 (ref 16–28)
ECHO LA VOLUME INDEX A4C: 42.54 ML/M2 (ref 16–28)
ECHO LV INTERNAL DIMENSION DIASTOLIC: 3.75 CM (ref 3.9–5.3)
ECHO LV INTERNAL DIMENSION SYSTOLIC: 2.91 CM
ECHO LV IVSD: 0.81 CM (ref 0.6–0.9)
ECHO LV MASS 2D: 90.7 G (ref 67–162)
ECHO LV MASS INDEX 2D: 56 G/M2 (ref 43–95)
ECHO LV POSTERIOR WALL DIASTOLIC: 0.88 CM (ref 0.6–0.9)
ECHO LVOT DIAM: 2.05 CM
ECHO LVOT PEAK GRADIENT: 3.28 MMHG
ECHO LVOT PEAK VELOCITY: 86.79 CM/S
ECHO PV MAX VELOCITY: 70.29 CM/S
ECHO PV PEAK INSTANTANEOUS GRADIENT SYSTOLIC: 2.22 MMHG
ECHO RIGHT VENTRICULAR SYSTOLIC PRESSURE (RVSP): 35.95 MMHG
ECHO RV INTERNAL DIMENSION: 5.03 CM
ECHO RV TAPSE: 1.78 CM (ref 1.5–2)
ECHO TV REGURGITANT MAX VELOCITY: 253.78 CM/S
ECHO TV REGURGITANT MAX VELOCITY: 499.51 CM/S
ECHO TV REGURGITANT PEAK GRADIENT: 25.95 MMHG
EOSINOPHIL # BLD: 0 K/UL (ref 0–0.4)
EOSINOPHIL # BLD: 0 K/UL (ref 0–0.4)
EOSINOPHIL NFR BLD: 0 % (ref 0–7)
EOSINOPHIL NFR BLD: 0 % (ref 0–7)
EPITH CASTS URNS QL MICRO: ABNORMAL /LPF
ERYTHROCYTE [DISTWIDTH] IN BLOOD BY AUTOMATED COUNT: 13.2 % (ref 11.5–14.5)
ERYTHROCYTE [DISTWIDTH] IN BLOOD BY AUTOMATED COUNT: 13.2 % (ref 11.5–14.5)
GLOBULIN SER CALC-MCNC: 3.6 G/DL (ref 2–4)
GLUCOSE SERPL-MCNC: 101 MG/DL (ref 65–100)
GLUCOSE SERPL-MCNC: 91 MG/DL (ref 65–100)
GLUCOSE UR STRIP.AUTO-MCNC: NEGATIVE MG/DL
HCT VFR BLD AUTO: 38.2 % (ref 35–47)
HCT VFR BLD AUTO: 40.1 % (ref 35–47)
HGB BLD-MCNC: 12 G/DL (ref 11.5–16)
HGB BLD-MCNC: 12.9 G/DL (ref 11.5–16)
HGB UR QL STRIP: NEGATIVE
IMM GRANULOCYTES # BLD AUTO: 0 K/UL (ref 0–0.04)
IMM GRANULOCYTES # BLD AUTO: 0 K/UL (ref 0–0.04)
IMM GRANULOCYTES NFR BLD AUTO: 0 % (ref 0–0.5)
IMM GRANULOCYTES NFR BLD AUTO: 1 % (ref 0–0.5)
KETONES UR QL STRIP.AUTO: 15 MG/DL
LACTATE SERPL-SCNC: 0.9 MMOL/L (ref 0.4–2)
LEUKOCYTE ESTERASE UR QL STRIP.AUTO: NEGATIVE
LYMPHOCYTES # BLD: 1.1 K/UL (ref 0.8–3.5)
LYMPHOCYTES # BLD: 1.2 K/UL (ref 0.8–3.5)
LYMPHOCYTES NFR BLD: 14 % (ref 12–49)
LYMPHOCYTES NFR BLD: 17 % (ref 12–49)
MAGNESIUM SERPL-MCNC: 1.9 MG/DL (ref 1.6–2.4)
MAGNESIUM SERPL-MCNC: 2 MG/DL (ref 1.6–2.4)
MAGNESIUM SERPL-MCNC: 2 MG/DL (ref 1.6–2.4)
MCH RBC QN AUTO: 30.3 PG (ref 26–34)
MCH RBC QN AUTO: 30.5 PG (ref 26–34)
MCHC RBC AUTO-ENTMCNC: 31.4 G/DL (ref 30–36.5)
MCHC RBC AUTO-ENTMCNC: 32.2 G/DL (ref 30–36.5)
MCV RBC AUTO: 94.8 FL (ref 80–99)
MCV RBC AUTO: 96.5 FL (ref 80–99)
MONOCYTES # BLD: 0.9 K/UL (ref 0–1)
MONOCYTES # BLD: 0.9 K/UL (ref 0–1)
MONOCYTES NFR BLD: 12 % (ref 5–13)
MONOCYTES NFR BLD: 13 % (ref 5–13)
NEUTS SEG # BLD: 4.6 K/UL (ref 1.8–8)
NEUTS SEG # BLD: 5.6 K/UL (ref 1.8–8)
NEUTS SEG NFR BLD: 69 % (ref 32–75)
NEUTS SEG NFR BLD: 72 % (ref 32–75)
NITRITE UR QL STRIP.AUTO: NEGATIVE
NRBC # BLD: 0 K/UL (ref 0–0.01)
NRBC # BLD: 0 K/UL (ref 0–0.01)
NRBC BLD-RTO: 0 PER 100 WBC
NRBC BLD-RTO: 0 PER 100 WBC
PH UR STRIP: 6.5 [PH] (ref 5–8)
PLATELET # BLD AUTO: 156 K/UL (ref 150–400)
PLATELET # BLD AUTO: 177 K/UL (ref 150–400)
PMV BLD AUTO: 10.5 FL (ref 8.9–12.9)
PMV BLD AUTO: 10.7 FL (ref 8.9–12.9)
POTASSIUM SERPL-SCNC: 3.8 MMOL/L (ref 3.5–5.1)
POTASSIUM SERPL-SCNC: 4 MMOL/L (ref 3.5–5.1)
PROT SERPL-MCNC: 7.5 G/DL (ref 6.4–8.2)
PROT UR STRIP-MCNC: NEGATIVE MG/DL
Q-T INTERVAL, ECG07: 364 MS
Q-T INTERVAL, ECG07: 384 MS
Q-T INTERVAL, ECG07: 396 MS
Q-T INTERVAL, ECG07: 434 MS
QRS DURATION, ECG06: 112 MS
QRS DURATION, ECG06: 120 MS
QRS DURATION, ECG06: 124 MS
QRS DURATION, ECG06: 128 MS
QTC CALCULATION (BEZET), ECG08: 505 MS
QTC CALCULATION (BEZET), ECG08: 505 MS
QTC CALCULATION (BEZET), ECG08: 526 MS
QTC CALCULATION (BEZET), ECG08: 581 MS
RBC # BLD AUTO: 3.96 M/UL (ref 3.8–5.2)
RBC # BLD AUTO: 4.23 M/UL (ref 3.8–5.2)
RBC #/AREA URNS HPF: ABNORMAL /HPF (ref 0–5)
SAMPLES BEING HELD,HOLD: NORMAL
SODIUM SERPL-SCNC: 133 MMOL/L (ref 136–145)
SODIUM SERPL-SCNC: 134 MMOL/L (ref 136–145)
SP GR UR REFRACTOMETRY: 1.01 (ref 1–1.03)
TROPONIN I SERPL-MCNC: <0.05 NG/ML
TROPONIN I SERPL-MCNC: <0.05 NG/ML
TSH SERPL DL<=0.05 MIU/L-ACNC: 1.4 UIU/ML (ref 0.36–3.74)
UROBILINOGEN UR QL STRIP.AUTO: 0.2 EU/DL (ref 0.2–1)
VENTRICULAR RATE, ECG03: 108 BPM
VENTRICULAR RATE, ECG03: 113 BPM
VENTRICULAR RATE, ECG03: 116 BPM
VENTRICULAR RATE, ECG03: 98 BPM
WBC # BLD AUTO: 6.7 K/UL (ref 3.6–11)
WBC # BLD AUTO: 7.7 K/UL (ref 3.6–11)
WBC URNS QL MICRO: ABNORMAL /HPF (ref 0–4)

## 2021-05-25 PROCEDURE — 74011250636 HC RX REV CODE- 250/636: Performed by: SPECIALIST

## 2021-05-25 PROCEDURE — 74011000250 HC RX REV CODE- 250: Performed by: EMERGENCY MEDICINE

## 2021-05-25 PROCEDURE — 82550 ASSAY OF CK (CPK): CPT

## 2021-05-25 PROCEDURE — 99223 1ST HOSP IP/OBS HIGH 75: CPT | Performed by: INTERNAL MEDICINE

## 2021-05-25 PROCEDURE — P9047 ALBUMIN (HUMAN), 25%, 50ML: HCPCS | Performed by: FAMILY MEDICINE

## 2021-05-25 PROCEDURE — 83735 ASSAY OF MAGNESIUM: CPT

## 2021-05-25 PROCEDURE — 83605 ASSAY OF LACTIC ACID: CPT

## 2021-05-25 PROCEDURE — 93005 ELECTROCARDIOGRAM TRACING: CPT

## 2021-05-25 PROCEDURE — 71045 X-RAY EXAM CHEST 1 VIEW: CPT

## 2021-05-25 PROCEDURE — 65660000001 HC RM ICU INTERMED STEPDOWN

## 2021-05-25 PROCEDURE — 99285 EMERGENCY DEPT VISIT HI MDM: CPT

## 2021-05-25 PROCEDURE — 84484 ASSAY OF TROPONIN QUANT: CPT

## 2021-05-25 PROCEDURE — 74011250636 HC RX REV CODE- 250/636: Performed by: FAMILY MEDICINE

## 2021-05-25 PROCEDURE — 80053 COMPREHEN METABOLIC PANEL: CPT

## 2021-05-25 PROCEDURE — 83880 ASSAY OF NATRIURETIC PEPTIDE: CPT

## 2021-05-25 PROCEDURE — 74011250637 HC RX REV CODE- 250/637: Performed by: FAMILY MEDICINE

## 2021-05-25 PROCEDURE — 94762 N-INVAS EAR/PLS OXIMTRY CONT: CPT

## 2021-05-25 PROCEDURE — 81001 URINALYSIS AUTO W/SCOPE: CPT

## 2021-05-25 PROCEDURE — 36415 COLL VENOUS BLD VENIPUNCTURE: CPT

## 2021-05-25 PROCEDURE — 85025 COMPLETE CBC W/AUTO DIFF WBC: CPT

## 2021-05-25 PROCEDURE — 96374 THER/PROPH/DIAG INJ IV PUSH: CPT

## 2021-05-25 PROCEDURE — 84443 ASSAY THYROID STIM HORMONE: CPT

## 2021-05-25 PROCEDURE — 93306 TTE W/DOPPLER COMPLETE: CPT

## 2021-05-25 RX ORDER — DILTIAZEM HYDROCHLORIDE 5 MG/ML
0.25 INJECTION INTRAVENOUS
Status: COMPLETED | OUTPATIENT
Start: 2021-05-25 | End: 2021-05-25

## 2021-05-25 RX ORDER — DOFETILIDE 0.12 MG/1
250 CAPSULE ORAL
Status: DISPENSED | OUTPATIENT
Start: 2021-05-25 | End: 2021-05-25

## 2021-05-25 RX ORDER — CARVEDILOL 3.12 MG/1
3.12 TABLET ORAL 2 TIMES DAILY WITH MEALS
Status: DISCONTINUED | OUTPATIENT
Start: 2021-05-25 | End: 2021-05-28 | Stop reason: HOSPADM

## 2021-05-25 RX ORDER — SODIUM CHLORIDE 0.9 % (FLUSH) 0.9 %
5-40 SYRINGE (ML) INJECTION EVERY 8 HOURS
Status: DISCONTINUED | OUTPATIENT
Start: 2021-05-25 | End: 2021-05-28 | Stop reason: HOSPADM

## 2021-05-25 RX ORDER — ALBUMIN HUMAN 250 G/1000ML
25 SOLUTION INTRAVENOUS EVERY 6 HOURS
Status: COMPLETED | OUTPATIENT
Start: 2021-05-25 | End: 2021-05-25

## 2021-05-25 RX ORDER — SODIUM CHLORIDE 0.9 % (FLUSH) 0.9 %
5-40 SYRINGE (ML) INJECTION AS NEEDED
Status: DISCONTINUED | OUTPATIENT
Start: 2021-05-25 | End: 2021-05-28 | Stop reason: HOSPADM

## 2021-05-25 RX ORDER — ATORVASTATIN CALCIUM 10 MG/1
10 TABLET, FILM COATED ORAL
Status: DISCONTINUED | OUTPATIENT
Start: 2021-05-25 | End: 2021-05-28 | Stop reason: HOSPADM

## 2021-05-25 RX ORDER — FUROSEMIDE 10 MG/ML
20 INJECTION INTRAMUSCULAR; INTRAVENOUS 2 TIMES DAILY
Status: DISCONTINUED | OUTPATIENT
Start: 2021-05-25 | End: 2021-05-27

## 2021-05-25 RX ORDER — ACETAMINOPHEN 325 MG/1
650 TABLET ORAL
Status: DISCONTINUED | OUTPATIENT
Start: 2021-05-25 | End: 2021-05-28 | Stop reason: HOSPADM

## 2021-05-25 RX ADMIN — Medication 10 ML: at 12:43

## 2021-05-25 RX ADMIN — ALBUMIN (HUMAN) 25 G: 0.25 INJECTION, SOLUTION INTRAVENOUS at 23:41

## 2021-05-25 RX ADMIN — SODIUM CHLORIDE 1000 ML: 9 INJECTION, SOLUTION INTRAVENOUS at 20:25

## 2021-05-25 RX ADMIN — APIXABAN 2.5 MG: 2.5 TABLET, FILM COATED ORAL at 20:30

## 2021-05-25 RX ADMIN — Medication 10 ML: at 22:00

## 2021-05-25 RX ADMIN — ATORVASTATIN CALCIUM 10 MG: 10 TABLET, FILM COATED ORAL at 20:31

## 2021-05-25 RX ADMIN — DILTIAZEM HYDROCHLORIDE 14 MG: 5 INJECTION INTRAVENOUS at 08:57

## 2021-05-25 RX ADMIN — FUROSEMIDE 20 MG: 10 INJECTION, SOLUTION INTRAMUSCULAR; INTRAVENOUS at 12:42

## 2021-05-25 RX ADMIN — ALBUMIN (HUMAN) 25 G: 0.25 INJECTION, SOLUTION INTRAVENOUS at 17:35

## 2021-05-25 RX ADMIN — CARVEDILOL 3.12 MG: 3.12 TABLET, FILM COATED ORAL at 20:30

## 2021-05-25 NOTE — PROGRESS NOTES
Transition of Care Plan  1. RUR- N/A / RRAT 5 Low Risk Level  2. DISPOSITION: TBD/subject to change pending recommendations; pending medical progression   -Anticipate home with family/fiend  Assistance once medically stable. - Cardiology Consulted and following. 3. F/U with PCP/Specialist    4. Transport: Assistance with arranging transport home. Patient expressed she would be able to pay for it if arranged. CM will continue to follow and assist with YULY needs as they arise. Reason for Admission:   Atrial fibrillation with RVR (HCC)                     RUR Score:    N/A / RRAT 5 Low Risk Level                 Plan for utilizing home health:   Not at this time. TBD/subject to change pending recommendations       PCP: YES First and Last name:  Kiara Mcdowell MD     Name of Practice: [facility]   Are you a current patient: Yes/No: YES   Approximate date of last visit: 4/23/21   Can you participate in a virtual visit with your PCP: YES                    Current Advanced Directive/Advance Care Plan: Full Code      Healthcare Decision Maker:  Children share responsibility. Son: Aashish Cespedes Day [phone] (Resides in [place]) and Daughter: Francisco Javier Mac [phone] (Resides in [place])                     79year old female, AOx4. PTA independent with ADL's and IADL's. DME utilized: handle bars in shower. Currently employed with [employer] with no significant financial stressors or concerns. Insurance verified: Medicare A&B/ [employer]. [employer] and [employer] are utilized for prescriptions. Care Management Interventions  PCP Verified by CM: Yes ([facility])  Last Visit to PCP: 04/23/21  Palliative Care Criteria Met (RRAT>21 & CHF Dx)?: No  Mode of Transport at Discharge:  Other (see comment) (Will pay for transport home)  Transition of Care Consult (CM Consult):  (No current CM consult or need)  Discharge Durable Medical Equipment: No  Physical Therapy Consult: No  Occupational Therapy Consult: No  Speech Therapy Consult: No  Current Support Network: Own Home, Lives Alone  Discharge Location  Discharge Placement: Home with family assistance (TBD/subject to change pending recommendations)    KATHLEEN Turner/YOLANDA  Care Management  11:58 AM

## 2021-05-25 NOTE — ED TRIAGE NOTES
Patient presents from home with complaints of being Afib, patient is unsure how long she has been in afib, reports history and that she was seen at her cardiologist yesterday and they told her she needed a cardioversion if she was still short of breath

## 2021-05-25 NOTE — ED NOTES
Verbal shift change report given to Artur Fonseca and Arnel (oncoming nurse) by Meena Ramirez (offgoing nurse). Report included the following information SBAR, Kardex, ED Summary and Recent Results.

## 2021-05-25 NOTE — ROUTINE PROCESS
TRANSFER - IN REPORT: 
 
Verbal report received from [facility], [address] [place] [address] (name) on Katy Nao Day  being received from [place], [address] [place] [address] (unit) for routine progression of care Report consisted of patients Situation, Background, Assessment and  
Recommendations(SBAR). Information from the following report(s) SBAR, Kardex, [facility] and Cardiac Rhythm A fib was reviewed with the receiving nurse. Opportunity for questions and clarification was provided. Assessment completed upon patients arrival to unit and care assumed.

## 2021-05-25 NOTE — ROUTINE PROCESS
TRANSFER - OUT REPORT: 
 
Verbal report given to Anisa(name) on Claud Heir W Day  being transferred to 4W(unit) for routine progression of care Report consisted of patients Situation, Background, Assessment and  
Recommendations(SBAR). Information from the following report(s) SBAR, Kardex, ED Summary and Recent Results was reviewed with the receiving nurse. Lines:  
Peripheral IV 05/25/21 Right Antecubital (Active) Site Assessment Clean, dry, & intact 05/25/21 3038 Phlebitis Assessment 0 05/25/21 0585 Infiltration Assessment 0 05/25/21 7700 Dressing Status Clean, dry, & intact 05/25/21 0856 Opportunity for questions and clarification was provided. Patient transported with: 
 Monitor Registered Nurse

## 2021-05-25 NOTE — ROUTINE PROCESS
1807: 
Pt arrived to unit. Pt's rhythm on monitor appears in sustained V Tach. Pt states she is okay, no c/o nausea or dizziness. /65. HR 120s-130s. Charge nurse notified and said to reach out to the doctor. Dr. Wally Verduzco with cardiology paged. No call back received at this time. 1830: Pt became dizzy, nauseous and stated \"I feel like I could just go to sleep\". BP checked was 55/24, dropped to the 30s. NS Bolus ordered and started. Rechecked BP manually and came up to 140/72 after about 20 minutes. Labs drawn and sent. Pt stable with monitor still displaying \"sustained V Tach\".

## 2021-05-25 NOTE — ED PROVIDER NOTES
61-year-old female with a history of paroxysmal atrial fibrillation, HTN, HLD, breast cancer and colon cancer, status post treatment, not currently undergoing chemo or radiation, presents to the emergency department stating that over the last approximately 1 week she has had increased fatigue and dyspnea on exertion. She states that in the beginning of the month she was diagnosed with pneumonia and was treated with a course of doxycycline and reportedly improved but over the last several days has worsened again. She denies any significant cough like she had before, no fever, URI symptoms, chest pain, nausea, vomiting, diarrhea, syncope. She denies any clear palpitation type symptoms but states that she occasionally feels a thump in her chest when her heart is beating regularly. She discussed her symptoms with her cardiologist performed yesterday and she was told that she may need a cardioversion if she was still short of breath. Because her symptoms have persisted she presents to the ED by EMS for further evaluation. Palpitations   Associated symptoms include shortness of breath. Pertinent negatives include no fever, no numbness, no chest pain, no abdominal pain, no nausea, no vomiting, no headaches, no back pain, no weakness and no cough. 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 Tamoxifen     Essential hypertension     Lung nodule 10/1/2009    Mixed hyperlipidemia 3/18/2009    Paroxysmal A-fib (HCC)     Paroxysmal atrial fibrillation ([address] 11/14/2019    Dr Gabrielle Watson Rectal polyp 3/18/2009    -adenocarcinoma Stage 1        Past Surgical History:   Procedure Laterality Date    CARDIOVERSION EXTERNAL  9/18/2013         ENDOSCOPY, COLON, DIAGNOSTIC      8/09 neg    HX BREAST LUMPECTOMY Right     HX COLONOSCOPY  HX ENDOSCOPY      HX GI      colon resection    HX GI      hemorrhoidectomy    HX ORTHOPAEDIC      Mortons neuroma left foot    HX PELVIC LAPAROSCOPY      ovarian cystectomy    HX TONSILLECTOMY           Family History:   Problem Relation Age of Onset    Stroke Father     Stroke Brother        Social History     Socioeconomic History    Marital status:      Spouse name: Not on file    Number of children: Not on file    Years of education: Not on file    Highest education level: Not on file   Occupational History    Not on file   Tobacco Use    Smoking status: Never Smoker    Smokeless tobacco: Never Used   Vaping Use    Vaping Use: Never used   Substance and Sexual Activity    Alcohol use: Yes     Alcohol/week: 1.7 standard drinks     Types: 2 Glasses of wine per week     Comment: occasional    Drug use: No    Sexual activity: Not Currently   Other Topics Concern    Not on file   Social History Narrative    Not on file     Social Determinants of Health     Financial Resource Strain:     Difficulty of Paying Living Expenses:    Food Insecurity:     Worried About Running Out of Food in the Last Year:     Ran Out of Food in the Last Year:    Transportation Needs:     Lack of Transportation (Medical):  Lack of Transportation (Non-Medical):    Physical Activity:     Days of Exercise per Week:     Minutes of Exercise per Session:    Stress:     Feeling of Stress :    Social Connections:     Frequency of Communication with Friends and Family:     Frequency of Social Gatherings with Friends and Family:     Attends Jain Services:     Active Member of Clubs or Organizations:     Attends Club or Organization Meetings:     Marital Status:    Intimate Partner Violence:     Fear of Current or Ex-Partner:     Emotionally Abused:     Physically Abused:     Sexually Abused:           ALLERGIES: Ciprofloxacin    Review of Systems   Constitutional: Positive for activity change and fatigue. Negative for appetite change, chills and fever. HENT: Negative for congestion, rhinorrhea, sinus pressure, sneezing and sore throat. Eyes: Negative for photophobia and visual disturbance. Respiratory: Positive for shortness of breath. Negative for cough. Cardiovascular: Positive for palpitations. Negative for chest pain and leg swelling. Gastrointestinal: Negative for abdominal pain, blood in stool, constipation, diarrhea, nausea and vomiting. Genitourinary: Negative for difficulty urinating, dysuria, flank pain, frequency, hematuria, menstrual problem, urgency, vaginal bleeding and vaginal discharge. Musculoskeletal: Negative for arthralgias, back pain, myalgias and neck pain. Skin: Negative for rash and wound. Neurological: Negative for syncope, weakness, numbness and headaches. Psychiatric/Behavioral: Negative for self-injury and suicidal ideas. All other systems reviewed and are negative. Vitals:    05/25/21 0823   BP: 130/85   Pulse: (!) 116   Resp: 16   Temp: 98.2 °F (36.8 °C)   SpO2: 100%   Weight: 56.7 kg (125 lb)            Physical Exam  Vitals and nursing note reviewed. Constitutional:       General: She is not in acute distress. Appearance: Normal appearance. She is well-developed. She is not diaphoretic. HENT:      Head: Normocephalic and atraumatic. Nose: Nose normal.   Eyes:      Extraocular Movements: Extraocular movements intact. Conjunctiva/sclera: Conjunctivae normal.      Pupils: Pupils are equal, round, and reactive to light. Cardiovascular:      Rate and Rhythm: Tachycardia present. Rhythm irregular. Heart sounds: Normal heart sounds. Pulmonary:      Effort: Pulmonary effort is normal.      Breath sounds: Rales present. Abdominal:      General: There is no distension. Palpations: Abdomen is soft. Tenderness: There is no abdominal tenderness. Musculoskeletal:         General: No tenderness.       Cervical back: Neck supple. Right lower leg: No edema. Left lower leg: No edema. Skin:     General: Skin is warm and dry. Neurological:      General: No focal deficit present. Mental Status: She is alert and oriented to person, place, and time. Cranial Nerves: No cranial nerve deficit. Sensory: No sensory deficit. Motor: No weakness. Coordination: Coordination normal.          MDM   67-year-old female with history of paroxysmal atrial fibrillation presents with A. fib with RVR. Afebrile with other vital signs stable no acute distress. Labs returned showing no leukocytosis or anemia, negative troponin but elevated BNP at 7597. Unremarkable electrolytes. CXR viewed by myself and read by radiology showing no acute abnormalities. She was given a dose of IV diltiazem and had improvement in her A. fib with RVR rate but continued with atrial fibrillation. Will start on diltiazem gtt. Cardiology was consulted, will admit to the hospitalist service for further care and assessment. Total critical care time spent exclusive of procedures:  50 minutes    Procedures    828 EKG shows atrial fibrillation with a rate of 113 bpm with LBBB and left axis deviation no acute ST or T wave abnormalities suggestive of ischemia. Perfect Serve Consult for Admission  9:46 AM    ED Room Number: ER15/15  Patient Name and age:  Alysa  Day 79 y.o.  female  Working Diagnosis:   1. Atrial fibrillation with RVR ([address]    2.  Congestive heart failure, unspecified HF chronicity, unspecified heart failure type (HCC)    3. Elevated brain natriuretic peptide (BNP) level        COVID-19 Suspicion:  no  Sepsis present:  no  Reassessment needed: no  Code Status:  Do Not Resuscitate  Readmission: no  Isolation Requirements:  no  Recommended Level of Care:  telemetry  Department:[facility] Adult ED - 21   Other: 79year-old with A. fib RVR symptomatic with increased shortness of breath and fatigue but no chest pain no fever or other infectious symptoms. Reportedly recently treated with doxycycline in the beginning of this month for reported pneumonia which improved. Labs show elevated BNP but negative troponin and normal lactic. Cardiology consulted. RVR resolved after dose of diltiazem.

## 2021-05-25 NOTE — ED NOTES
Pt assisted to bathroom in wheelchair with RN, pt complaining of nausea and dizziness in bathroom and assisted back to room. Pt  when placed back on telemetry, EKG done and given to Dr. Nelli Yip.

## 2021-05-25 NOTE — CONSULTS
Alex Humphreys     1940      Jenetta Meigs, MD    Date of Visit-5/25/2021   PCP is Silvana Lunsford MD   [facility]  [facility]  HPI:  Alex Humphreys is a 79 y.o. female   Subjective:   F/u  atrial fibrillation had  cardioversion, not  symptomatic. She was on a lower dose of Eliquis because she'd had some GYN bleeding previously. She saw Dr. 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 Dr. Charli Hidalgo recommended that she return to sinus rhythm and referred her to Dr. Linton Moritz at [facility]. She has seen her recently. She has had 2 cardioversions but back in aflutter again. Once with po tikosyn and once with cardioversion. Dr. Delayne Hodgkin said she was not a candidate for ablation. Seen in [place] yesterday. Back in afib, woke her up, thinks it started last week. Hx of metoprolol dofetalide(long QT on that) had to lower the dose and now on it with the coreg. She came in today with the shortness of breath and cough starting up, kept thinking the afib was going to go away but it never did. Sunday getting faint and dizzy. Thinks it is all afib. Last ate this am 6am.     Hx also of seeing Yareli Wren, Dr. Morales Vera, in March, having a previous history of breast cancer and adenocarcinoma. She was seen by GYN 05/24/18 with a histography, polypectomy and D&C done at that time as an outpatient. She is to get a repeat CT scan in June. Reading the [address] thoracic surgeon note, it notes that it is pulmonary nodules that were there before and there is a repeat CT scan planned as a three month follow up.  From a cardiac point of view     MRI for Thoracic surgery at [address] every 6 months  Saw Dr Linton Moritz [facility] Dec 2018 with ACE added for high BP    At this point today since she has responded previously to [facility] for cardioversion can go ahead and give her an extra dose her QT is normal so she should be able to tolerate that if she does not respond to the extra dose of Tikosyn then we will go ahead and proceed with electrical cardioversion and I will asked Dr. Jonathan Fontaine to see her from our group as well. At this point she prefers to get her hospital-based care at [address] [place] [address] and [facility] and prefer to get reintroduced to our group for management of her A. fib so were happy to do that today an echo is pending to look at her cardiac structure and function but it was normal approximately 1 year ago when done. Her proBNP is elevated consistent with mild diastolic heart failure related to volume overload from uncontrolled A. fib. She is resting comfortably satting well and complete does not complain of chest pain. Do not appear to be other acute issues at the moment I will also contact hospitalist for admission    Assessment/Plan:     1. Persistent atrial fibrillation.   -----prevent the risk of stroke- lower dose of Eliquis seems to be an appropriate balance between bleeding risk and stroke risk.  ----on Dofetilide ,repeat cardioversion 04/02/18. 2. 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 HTN -- bp at goal for age  3. PVCs now asymptomatic   5. Pulmonary nodules, followed at [facility]. History of cancer in the colon, adenocarcinoma, breast cancer. 6. Dyslipidemia, on Atorvastatin. Future Appointments   Date Time Provider Corky Mann   5/27/2021  7:30 AM Johnny Jones MD [facility] BS AMB   10/21/2021  7:30 AM Johnny Jones MD [facility] BS AMB      Stable fu one year  Key CAD CHF Meds             atorvastatin (LIPITOR) 10 mg tablet TAKE 1/2 TABLET BY MOUTH EVERY DAY    valsartan (DIOVAN) 320 mg tablet TAKE 1 TABLET BY MOUTH DAILY    apixaban (Eliquis) 2.5 mg tablet Take 1 Tab by mouth two (2) times a day. carvediloL (COREG) 3.125 mg tablet TAKE 1 TABLET BY MOUTH TWICE DAILY. STOP METOPROLOL    amLODIPine (NORVASC) 5 mg tablet Take 1.5 Tabs by mouth daily.     dofetilide (TIKOSYN) 500 mcg capsule Take 250 mcg by mouth two (2) times a day. Impression:   1. Atrial fibrillation with RVR ([address]    2. Congestive heart failure, unspecified HF chronicity, unspecified heart failure type (HCC)    3. Elevated brain natriuretic peptide (BNP) level       Cardiac History:   MAGALI 9-18-13=EF 55-60, mild MR  CV 9-18-13-sucessful with 150 J one shock  ECHO 8-26-13=mild dilated RV, 2+ ZOË, 2+ MR,TR, mild pulm HTN  ECHO 3-= EF 65% ZOË,mild MR,TR  CATH 4-= normal cors, ef 60%     ROS-except as noted above. . A complete cardiac and respiratory are reviewed and negative except as above ; Resp-denies wheezing  or productive cough,. 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 Tamoxifen     Essential hypertension     Lung nodule 10/1/2009    Mixed hyperlipidemia 3/18/2009    Paroxysmal A-fib (HCC)     Paroxysmal atrial fibrillation ([facility] 11/14/2019    Dr Key Bolds Rectal polyp 3/18/2009    -adenocarcinoma Stage 1       Social Hx= reports that she has never smoked. She has never used smokeless tobacco. She reports current alcohol use of about 1.7 standard drinks of alcohol per week. She reports that she does not use drugs. Exam and Labs:  /66   Pulse 74   Temp 98.2 °F (36.8 °C)   Resp 17   Wt 125 lb (56.7 kg)   LMP  (LMP Unknown)   SpO2 97%   BMI 20.80 kg/m² Constitutional:  NAD, comfortable  Head: NC,AT. Eyes: No scleral icterus. Neck:  Neck supple. No JVD present. Throat: moist mucous membranes. Chest: Effort normal & normal respiratory excursion . Neurological: alert, conversant and oriented .  Skin: Skin is not cold. No obvious systemic rash noted. Not diaphoretic. No erythema. Psychiatric:  Grossly normal mood and affect. Behavior appears normal. Extremities:  no clubbing or cyanosis. Abdomen: non distended    Lungs:breath sounds normal. No stridor. distress, wheezes or  Rales. Heart:RRR  normal S1, S2, no murmurs, rubs, clicks or gallops , PMI non displaced. Edema: Edema is none. Wt Readings from Last 3 Encounters:   05/25/21 125 lb (56.7 kg)   04/23/21 124 lb 12.8 oz (56.6 kg)   01/07/21 125 lb 6.4 oz (56.9 kg)      BP Readings from Last 3 Encounters:   05/25/21 100/66   04/23/21 128/82   01/07/21 (!) 142/76      Current Facility-Administered Medications   Medication Dose Route Frequency    dilTIAZem (CARDIZEM) 125 mg in dextrose 5% 125 mL infusion  0-15 mg/hr IntraVENous TITRATE     Current Outpatient Medications   Medication Sig    hydrocortisone (HYTONE) 2.5 % topical cream APPLY TO AFFECTED AREA TWICE A DAY    diphenhydrAMINE (Benadryl Allergy) 25 mg tablet Take 25 mg by mouth every six (6) hours as needed.  fluticasone propionate (FLONASE) 50 mcg/actuation nasal spray 2 Sprays by Both Nostrils route daily.  cetirizine (ZYRTEC) 10 mg tablet Take 1 Tab by mouth daily as needed for Allergies.  atorvastatin (LIPITOR) 10 mg tablet TAKE 1/2 TABLET BY MOUTH EVERY DAY    valsartan (DIOVAN) 320 mg tablet TAKE 1 TABLET BY MOUTH DAILY    apixaban (Eliquis) 2.5 mg tablet Take 1 Tab by mouth two (2) times a day.  carvediloL (COREG) 3.125 mg tablet TAKE 1 TABLET BY MOUTH TWICE DAILY. STOP METOPROLOL    amLODIPine (NORVASC) 5 mg tablet Take 1.5 Tabs by mouth daily. (Patient taking differently: Take 5 mg by mouth daily.)    acetaminophen (TYLENOL EXTRA STRENGTH) 500 mg tablet Take  by mouth as needed for Pain.  dofetilide (TIKOSYN) 500 mcg capsule Take 250 mcg by mouth two (2) times a day. Impression see above.

## 2021-05-26 ENCOUNTER — ANESTHESIA EVENT (OUTPATIENT)
Dept: CARDIAC CATH/INVASIVE PROCEDURES | Age: 81
DRG: 308 | End: 2021-05-26
Payer: MEDICARE

## 2021-05-26 ENCOUNTER — ANESTHESIA (OUTPATIENT)
Dept: CARDIAC CATH/INVASIVE PROCEDURES | Age: 81
DRG: 308 | End: 2021-05-26
Payer: MEDICARE

## 2021-05-26 LAB
ANION GAP SERPL CALC-SCNC: 11 MMOL/L (ref 5–15)
BASOPHILS # BLD: 0 K/UL (ref 0–0.1)
BASOPHILS NFR BLD: 1 % (ref 0–1)
BUN SERPL-MCNC: 12 MG/DL (ref 6–20)
BUN/CREAT SERPL: 21 (ref 12–20)
CALCIUM SERPL-MCNC: 8.6 MG/DL (ref 8.5–10.1)
CHLORIDE SERPL-SCNC: 105 MMOL/L (ref 97–108)
CO2 SERPL-SCNC: 21 MMOL/L (ref 21–32)
CREAT SERPL-MCNC: 0.57 MG/DL (ref 0.55–1.02)
DIFFERENTIAL METHOD BLD: ABNORMAL
EOSINOPHIL # BLD: 0 K/UL (ref 0–0.4)
EOSINOPHIL NFR BLD: 0 % (ref 0–7)
ERYTHROCYTE [DISTWIDTH] IN BLOOD BY AUTOMATED COUNT: 13.2 % (ref 11.5–14.5)
GLUCOSE SERPL-MCNC: 70 MG/DL (ref 65–100)
HCT VFR BLD AUTO: 32.2 % (ref 35–47)
HGB BLD-MCNC: 10.5 G/DL (ref 11.5–16)
IMM GRANULOCYTES # BLD AUTO: 0 K/UL (ref 0–0.04)
IMM GRANULOCYTES NFR BLD AUTO: 0 % (ref 0–0.5)
LYMPHOCYTES # BLD: 1.4 K/UL (ref 0.8–3.5)
LYMPHOCYTES NFR BLD: 23 % (ref 12–49)
MCH RBC QN AUTO: 30.3 PG (ref 26–34)
MCHC RBC AUTO-ENTMCNC: 32.6 G/DL (ref 30–36.5)
MCV RBC AUTO: 93.1 FL (ref 80–99)
MONOCYTES # BLD: 0.8 K/UL (ref 0–1)
MONOCYTES NFR BLD: 12 % (ref 5–13)
NEUTS SEG # BLD: 3.9 K/UL (ref 1.8–8)
NEUTS SEG NFR BLD: 64 % (ref 32–75)
NRBC # BLD: 0 K/UL (ref 0–0.01)
NRBC BLD-RTO: 0 PER 100 WBC
PLATELET # BLD AUTO: 138 K/UL (ref 150–400)
PMV BLD AUTO: 10.9 FL (ref 8.9–12.9)
POTASSIUM SERPL-SCNC: 3.4 MMOL/L (ref 3.5–5.1)
RBC # BLD AUTO: 3.46 M/UL (ref 3.8–5.2)
SODIUM SERPL-SCNC: 137 MMOL/L (ref 136–145)
TROPONIN I SERPL-MCNC: <0.05 NG/ML
WBC # BLD AUTO: 6.2 K/UL (ref 3.6–11)

## 2021-05-26 PROCEDURE — 74011250637 HC RX REV CODE- 250/637: Performed by: HOSPITALIST

## 2021-05-26 PROCEDURE — 74011250637 HC RX REV CODE- 250/637: Performed by: NURSE PRACTITIONER

## 2021-05-26 PROCEDURE — 92960 CARDIOVERSION ELECTRIC EXT: CPT | Performed by: SPECIALIST

## 2021-05-26 PROCEDURE — 77030039046 HC PAD DEFIB RADIOTRNSPNT CNMD -B: Performed by: SPECIALIST

## 2021-05-26 PROCEDURE — 5A2204Z RESTORATION OF CARDIAC RHYTHM, SINGLE: ICD-10-PCS | Performed by: SPECIALIST

## 2021-05-26 PROCEDURE — 93005 ELECTROCARDIOGRAM TRACING: CPT

## 2021-05-26 PROCEDURE — 85025 COMPLETE CBC W/AUTO DIFF WBC: CPT

## 2021-05-26 PROCEDURE — 74011250636 HC RX REV CODE- 250/636: Performed by: NURSE PRACTITIONER

## 2021-05-26 PROCEDURE — 99233 SBSQ HOSP IP/OBS HIGH 50: CPT | Performed by: INTERNAL MEDICINE

## 2021-05-26 PROCEDURE — 65660000001 HC RM ICU INTERMED STEPDOWN

## 2021-05-26 PROCEDURE — 74011250636 HC RX REV CODE- 250/636: Performed by: NURSE ANESTHETIST, CERTIFIED REGISTERED

## 2021-05-26 PROCEDURE — 76060000031 HC ANESTHESIA FIRST 0.5 HR: Performed by: SPECIALIST

## 2021-05-26 PROCEDURE — 80048 BASIC METABOLIC PNL TOTAL CA: CPT

## 2021-05-26 PROCEDURE — 74011250636 HC RX REV CODE- 250/636: Performed by: FAMILY MEDICINE

## 2021-05-26 PROCEDURE — 84484 ASSAY OF TROPONIN QUANT: CPT

## 2021-05-26 PROCEDURE — 74011250637 HC RX REV CODE- 250/637: Performed by: FAMILY MEDICINE

## 2021-05-26 PROCEDURE — 36415 COLL VENOUS BLD VENIPUNCTURE: CPT

## 2021-05-26 PROCEDURE — 99223 1ST HOSP IP/OBS HIGH 75: CPT | Performed by: INTERNAL MEDICINE

## 2021-05-26 RX ORDER — POTASSIUM CHLORIDE 750 MG/1
20 TABLET, FILM COATED, EXTENDED RELEASE ORAL
Status: COMPLETED | OUTPATIENT
Start: 2021-05-26 | End: 2021-05-26

## 2021-05-26 RX ORDER — MAGNESIUM SULFATE HEPTAHYDRATE 40 MG/ML
2 INJECTION, SOLUTION INTRAVENOUS ONCE
Status: COMPLETED | OUTPATIENT
Start: 2021-05-26 | End: 2021-05-26

## 2021-05-26 RX ORDER — LANOLIN ALCOHOL/MO/W.PET/CERES
400 CREAM (GRAM) TOPICAL 2 TIMES DAILY
Status: DISCONTINUED | OUTPATIENT
Start: 2021-05-26 | End: 2021-05-28 | Stop reason: HOSPADM

## 2021-05-26 RX ORDER — SODIUM CHLORIDE 9 MG/ML
INJECTION, SOLUTION INTRAVENOUS
Status: DISCONTINUED | OUTPATIENT
Start: 2021-05-26 | End: 2021-05-26 | Stop reason: HOSPADM

## 2021-05-26 RX ORDER — BENZONATATE 100 MG/1
100 CAPSULE ORAL
Status: DISCONTINUED | OUTPATIENT
Start: 2021-05-26 | End: 2021-05-28 | Stop reason: HOSPADM

## 2021-05-26 RX ORDER — IPRATROPIUM BROMIDE AND ALBUTEROL SULFATE 2.5; .5 MG/3ML; MG/3ML
3 SOLUTION RESPIRATORY (INHALATION)
Status: DISCONTINUED | OUTPATIENT
Start: 2021-05-26 | End: 2021-05-28 | Stop reason: HOSPADM

## 2021-05-26 RX ORDER — POTASSIUM CHLORIDE 750 MG/1
20 TABLET, FILM COATED, EXTENDED RELEASE ORAL DAILY
Status: DISCONTINUED | OUTPATIENT
Start: 2021-05-26 | End: 2021-05-28 | Stop reason: HOSPADM

## 2021-05-26 RX ORDER — PROPOFOL 10 MG/ML
INJECTION, EMULSION INTRAVENOUS AS NEEDED
Status: DISCONTINUED | OUTPATIENT
Start: 2021-05-26 | End: 2021-05-26 | Stop reason: HOSPADM

## 2021-05-26 RX ADMIN — FUROSEMIDE 20 MG: 10 INJECTION, SOLUTION INTRAMUSCULAR; INTRAVENOUS at 08:33

## 2021-05-26 RX ADMIN — PROPOFOL 50 MG: 10 INJECTION, EMULSION INTRAVENOUS at 15:49

## 2021-05-26 RX ADMIN — FUROSEMIDE 20 MG: 10 INJECTION, SOLUTION INTRAMUSCULAR; INTRAVENOUS at 18:03

## 2021-05-26 RX ADMIN — Medication 10 ML: at 22:53

## 2021-05-26 RX ADMIN — CARVEDILOL 3.12 MG: 3.12 TABLET, FILM COATED ORAL at 18:02

## 2021-05-26 RX ADMIN — APIXABAN 2.5 MG: 2.5 TABLET, FILM COATED ORAL at 08:32

## 2021-05-26 RX ADMIN — MAGNESIUM OXIDE TAB 400 MG (241.3 MG ELEMENTAL MG) 400 MG: 400 (241.3 MG) TAB at 10:30

## 2021-05-26 RX ADMIN — MAGNESIUM OXIDE TAB 400 MG (241.3 MG ELEMENTAL MG) 400 MG: 400 (241.3 MG) TAB at 18:14

## 2021-05-26 RX ADMIN — CARVEDILOL 3.12 MG: 3.12 TABLET, FILM COATED ORAL at 08:32

## 2021-05-26 RX ADMIN — APIXABAN 2.5 MG: 2.5 TABLET, FILM COATED ORAL at 22:50

## 2021-05-26 RX ADMIN — SODIUM CHLORIDE: 900 INJECTION, SOLUTION INTRAVENOUS at 15:44

## 2021-05-26 RX ADMIN — POTASSIUM CHLORIDE 20 MEQ: 750 TABLET, EXTENDED RELEASE ORAL at 10:30

## 2021-05-26 RX ADMIN — POTASSIUM CHLORIDE 20 MEQ: 750 TABLET, EXTENDED RELEASE ORAL at 18:02

## 2021-05-26 RX ADMIN — ATORVASTATIN CALCIUM 10 MG: 10 TABLET, FILM COATED ORAL at 22:50

## 2021-05-26 RX ADMIN — MAGNESIUM SULFATE HEPTAHYDRATE 2 G: 40 INJECTION, SOLUTION INTRAVENOUS at 18:03

## 2021-05-26 NOTE — ANESTHESIA POSTPROCEDURE EVALUATION
Post-Anesthesia Evaluation and Assessment    Patient: Giovanni Krabbe Day MRN: [id number]  SSN: [id number]    YOB: 1940  Age: 79 y.o. Sex: female      I have evaluated the patient and they are stable and ready for discharge from the PACU. Cardiovascular Function/Vital Signs  Visit Vitals  BP (!) 104/58   Pulse 76   Temp 36.7 °C (98 °F)   Resp 17   Ht 5' 5\" (1.651 m)   Wt 55.7 kg (122 lb 11.2 oz)   SpO2 100%   BMI 20.42 kg/m²       Patient is status post Con-Sed anesthesia for Procedure(s):  EP CARDIOVERSION. Nausea/Vomiting: None    Postoperative hydration reviewed and adequate. Pain:  Pain Scale 1: Numeric (0 - 10) (05/26/21 1608)  Pain Intensity 1: 0 (05/26/21 1608)   Managed    Neurological Status: At baseline    Mental Status, Level of Consciousness: Alert and  oriented to person, place, and time    Pulmonary Status:   O2 Device: Nasal cannula (05/26/21 1609)   Adequate oxygenation and airway patent    Complications related to anesthesia: None    Post-anesthesia assessment completed. No concerns    Signed By: Santana Chisholm MD     May 26, 2021              Procedure(s):  EP CARDIOVERSION. MAC    <BSHSIANPOST>    INITIAL Post-op Vital signs:   Vitals Value Taken Time   /58 05/26/21 1615   Temp 36.7 °C (98 °F) 05/26/21 1609   Pulse 79 05/26/21 1621   Resp 18 05/26/21 1621   SpO2 100 % 05/26/21 1621   Vitals shown include unvalidated device data.

## 2021-05-26 NOTE — PROGRESS NOTES
Delightful 79years old lady with a past medical history remarkable for paroxysmal atrial fibrillation status post cardioversions in the past last 1 was apparent approximately 3 years ago. She has been treated at [address] [place] [address] by pulmonary for atypical Mycobacterium infection. She was seen as an outpatient the day before admission and noted to be back in atrial fibrillation which apparently woke her up from sleep. Some mild dizziness reported which she attributes to the atrial fibrillation. She remains in atrial fibrillation with a rapid ventricular response. She is currently on Tikosyn. She is also on Eliquis. She tells me she has not stop Eliquis for even a single dose. She is borderline for the 2.5 mg twice daily Eliquis given her weight and almost the [de-identified]years old age. But also she has had GYN bleeding issues for which the Eliquis has been kept at 2.5 mg twice daily. The patient is aware of this. I was asked to proceed with DC cardioversion by Dr. Anuradha Ramos. Discussed risks and benefit of DC cardioversion with the patient. Risks including but not limited to CVA, death, respiratory depression requiring sedation, emergency pacemaker placement permanent pacemaker placement cutaneous burns and skin irritation at the site of plaque pats position. Patient understood and is in she is willing agreeable to proceed with DC cardioversion as planned by and with Dr. Anuradha Ramos.

## 2021-05-26 NOTE — PROGRESS NOTES
Pt asymptomatic to 16 beat run of wide complex accelerated rhythm. Dr Oli Moore made aware. No new orders.

## 2021-05-26 NOTE — ANESTHESIA PREPROCEDURE EVALUATION
Relevant Problems   RESPIRATORY SYSTEM   (+) Dyspnea on exertion      CARDIOVASCULAR   (+) Atrial fibrillation with RVR (HCC)   (+) Hypertension, essential, benign   (+) PVC (premature ventricular contraction)   (+) Paroxysmal atrial fibrillation (HCC)      PERSONAL HX & FAMILY HX OF CANCER   (+) Breast cancer (HCC)       Anesthetic History   No history of anesthetic complications            Review of Systems / Medical History  Patient summary reviewed, nursing notes reviewed and pertinent labs reviewed    Pulmonary  Within defined limits                 Neuro/Psych   Within defined limits           Cardiovascular    Hypertension        Dysrhythmias : atrial fibrillation           GI/Hepatic/Renal  Within defined limits              Endo/Other        Cancer     Other Findings              Physical Exam    Airway  Mallampati: II  TM Distance: > 6 cm  Neck ROM: normal range of motion   Mouth opening: Normal     Cardiovascular  Regular rate and rhythm,  S1 and S2 normal,  no murmur, click, rub, or gallop             Dental  No notable dental hx       Pulmonary  Breath sounds clear to auscultation               Abdominal  GI exam deferred       Other Findings            Anesthetic Plan    ASA: 2  Anesthesia type: MAC and total IV anesthesia            Anesthetic plan and risks discussed with: Patient

## 2021-05-26 NOTE — PROCEDURES
Cardiac Catheterization Procedure Note   Patient: Alex Cristina Day  MRN: [id number]  SSN: [id number]   YOB: 1940 Age: 79 y.o.   Sex: female    Date of Procedure: 5/26/2021   Pre-procedure Diagnosis: Atrial Fibrillation/Atrial Flutter  Post-procedure Diagnosis: NSR  Procedure: Cardioversion  :  Dr. Linda Morgan MD    Assistant(s):  None  Anesthesia: Moderate Sedation   Estimated Blood Loss: Less than 10 mL   Specimens Removed: None  Findings: 200 j delivered in sync with restoration of NSR  Complications: None   Implants:  None  Signed by:  Lidna Morgan MD  5/26/2021  4:07 PM

## 2021-05-26 NOTE — PROGRESS NOTES
Transitions of Care: 13% risk of re-admission      -Anticipate discharge home, may benefit from PT evaluation    -Will need transportation assistance, [facility]- patient willing to pay Out-of-pocket    The CM reviewed previous CM notes, also met with the patient at bedside. 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 The patient is , lives alone in ground level apartment. She is interested in resources for private duty- she endorses that she does not feel like she needs them currently, however, would like to have the resources for in-the-future. The CM provided private duty list at bedside, monitor for home health needs- patient will need transportation home upon discharge, agreeable to pay OOP for [facility]. The patient may need help getting in and out of the vehicle, but otherwise she endorses she is ambulatory. Patient undergoing cardioversion today, will monitor. 15:53 p.m.- Patient is in cath lab for Cardioversion- TBD if patient will d/c today vs. Tomorrow- CM called [facility] program, spoke with Brian- they can take calls up until 10:00 p.m., Brian endorses they could help patient in and out of the vehicle- will let RN page [facility]  (can be reached at [facility]) if discharged after hours- patient remains in Cardiac Cath lab at this time. [facility] ([phone]). Patient previously endorsed she would pay OOP-     16:26 p.m.- CM cannot establish transport because patient is still in cardiac cath lab- TBD if patient will discharge today or tomorrow- still in cath lab. RN has [facility]ARED phone number if patient discharges tonight to establish hospital to home transport- Brian endorsed they take calls up until 10:00 p.m., RN endorses patient is ambulatory- may need assist in and out of car, [facility] could provide.      Kelvin Rowland LCSW

## 2021-05-26 NOTE — PROGRESS NOTES
Problem: Discharge Planning  Goal: *Discharge to safe environment  Outcome: Progressing Towards Goal     Problem: Falls - Risk of  Goal: *Absence of Falls  Description: Document Star Giraldo Fall Risk and appropriate interventions in the flowsheet.   Outcome: Progressing Towards Goal  Note: Fall Risk Interventions:            Medication Interventions: Evaluate medications/consider consulting pharmacy                   Problem: Patient Education: Go to Patient Education Activity  Goal: Patient/Family Education  Outcome: Progressing Towards Goal

## 2021-05-26 NOTE — PROGRESS NOTES
[facility]                                                                                          Hospitalist Progress Note  Beulah Lobo MD  Answering service: [phone] OR [phone] from in house phone        Date of Service:  2021  NAME:  Jessica Humphreys  :  1940  MRN:  [id number]    This documentation was facilitated by a Voice Recognition software and may contain inadvertent typographical errors. Admission Summary:   Admitted for AFIB w/RVR when she presented with palpitation and SOB. PMH significant for afib,bronchientasis,breast cancer. 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 She said they recommended 2 years of antibiotics she is reluctant to accept. She also sees EP cards at [facility]. Interval history / Subjective:        She says she slept well,no complaints. No chest pressure/pain, palpitation or dizziness. HR low 100s. On the bedside monitor   Assessment & Plan:   Persistent afib with RVR. Previously failed chemical and electrical cardioversion   -On Cardizem gtt. HR better  -On coreg and Tikosyn at home.  -On apixaban for stroke prevention  -Cards on board considering cardioversion  -TTE normal EF,diastolic function. HTN,BP within target  Dyslipidemia: continue atorvastatin    Bronchiectasis,pul nodule? MAC infection  -Follows at [facility]. She sees Dr Alvina Castillo at [facility]  -nebs and cough meds as needed     H/o Breast cancer        Code status: full  DVT prophylaxis: [address] discussed with: Patient/Family and Nurse  Anticipated Disposition: Home w/Family  Anticipated Discharge: 24 hours to 48 hours  Hospital Problems  Date Reviewed: 2021        Codes Class Noted POA    Atrial fibrillation with RVR ([facility] [address] ICD-10-CM: I48.91  ICD-9-CM: 427.31  2021 Unknown Review of Systems:   A comprehensive review of systems was negative except for that written in the HPI. Vital Signs:    Last 24hrs VS reviewed since prior progress note. Most recent are:  Visit Vitals  /69 (BP 1 Location: Left arm, BP Patient Position: At rest)   Pulse 94   Temp 98.5 °F (36.9 °C)   Resp 19   Ht 5' 5\" (1.651 m)   Wt 55.7 kg (122 lb 11.2 oz)   SpO2 95%   BMI 20.42 kg/m²         Intake/Output Summary (Last 24 hours) at 5/26/2021 1784  Last data filed at 5/26/2021 0000  Gross per 24 hour   Intake 0 ml   Output 0 ml   Net 0 ml        Physical Examination:     I had a face to face encounter with this patient and independently examined them on5/26/2021 as outlined below:        Constitutional:  No acute distress, cooperative, pleasant    HEENT:  Atraumatic. Oral mucosa moist,. Non icteric sclera. No pallor. Resp:  CTA bilaterally. No wheezing/rhonchi/rales. No accessory muscle use   Chest Wall: No deformity   CV:  IRRegular rhythm, normal rate, no murmurs, gallops, rubs    GI:  Soft, non distended, non tender. normoactive bowel sounds, no hepatosplenomegaly    :  No CVA or suprapubic tenderness    Musculoskeletal:  No edema.     Neurologic:  Mental status:AAOx3,   Cranial nerves II-XII : WNL  Motor exam:Moves all extremities symmetrically              Data Review:    Review and/or order of clinical lab test  Review and/or order of tests in the radiology section of CPT  Review and/or order of tests in the medicine section of CPT      Labs:     Recent Labs     05/26/21  0446 05/25/21  1900   WBC 6.2 7.7   HGB 10.5* 12.0   HCT 32.2* 38.2   * 156     Recent Labs     05/25/21  1900 05/25/21  0830   * 133*   K 3.8 4.0    101   CO2 21 28   BUN 14 12   CREA 0.76 0.76   GLU 91 101*   CA 8.8 8.9   MG 1.9 2.0  2.0     Recent Labs     05/25/21  0830   ALT 38   *   TBILI 0.6   TP 7.5   ALB 3.9   GLOB 3.6     No results for input(s): INR, PTP, APTT, INREXT in the last 72 hours. No results for input(s): FE, TIBC, PSAT, FERR in the last 72 hours. No results found for: FOL, RBCF   No results for input(s): PH, PCO2, PO2 in the last 72 hours.   Recent Labs     05/25/21  1730 05/25/21  0830   CPK 60  58 52   TROIQ <0.05 <0.05     Lab Results   Component Value Date/Time    Cholesterol, total 139 04/30/2021 08:24 AM    HDL Cholesterol 63 04/30/2021 08:24 AM    LDL, calculated 62 04/30/2021 08:24 AM    LDL, calculated 74.6 10/26/2020 04:01 PM    Triglyceride 68 04/30/2021 08:24 AM    CHOL/HDL Ratio 2.5 10/26/2020 04:01 PM     No results found for: [facility]  Lab Results   Component Value Date/Time    Color YELLOW/STRAW 05/25/2021 12:46 PM    Appearance CLEAR 05/25/2021 12:46 PM    Specific gravity 1.009 05/25/2021 12:46 PM    Specific gravity 1.015 08/24/2013 04:10 AM    pH (UA) 6.5 05/25/2021 12:46 PM    Protein Negative 05/25/2021 12:46 PM    Glucose Negative 05/25/2021 12:46 PM    Ketone 15 (A) 05/25/2021 12:46 PM    Bilirubin Negative 05/25/2021 12:46 PM    Urobilinogen 0.2 05/25/2021 12:46 PM    Nitrites Negative 05/25/2021 12:46 PM    Leukocyte Esterase Negative 05/25/2021 12:46 PM    Epithelial cells FEW 05/25/2021 12:46 PM    Bacteria Negative 05/25/2021 12:46 PM    WBC 0-4 05/25/2021 12:46 PM    RBC 0-5 05/25/2021 12:46 PM         Medications Reviewed:     Current Facility-Administered Medications   Medication Dose Route Frequency    dilTIAZem (CARDIZEM) 125 mg in dextrose 5% 125 mL infusion  0-15 mg/hr IntraVENous TITRATE    apixaban (ELIQUIS) tablet 2.5 mg  2.5 mg Oral BID    atorvastatin (LIPITOR) tablet 10 mg  10 mg Oral QHS    carvediloL (COREG) tablet 3.125 mg  3.125 mg Oral BID WITH MEALS    sodium chloride (NS) flush 5-40 mL  5-40 mL IntraVENous Q8H    sodium chloride (NS) flush 5-40 mL  5-40 mL IntraVENous PRN    acetaminophen (TYLENOL) tablet 650 mg  650 mg Oral Q4H PRN    furosemide (LASIX) injection 20 mg  20 mg IntraVENous BID ______________________________________________________________________  EXPECTED LENGTH OF STAY: - - -  ACTUAL LENGTH OF STAY:          1                 Marilu Mckeon MD

## 2021-05-26 NOTE — CONSULTS
Cardiac Electrophysiology Hospital Initial Visit Note     Subjective:       Jonathan Humphreys is a 79 y.o. patient who is seen for evaluation/management of possible VT. Dr Monserrat Dooley asked me to see her  Admitted 05/25/2021 with increased fatigue & GURROLA, also with occasional \"thump\" in chest, but denied palpitations.       ECG in ER showed AFL/AF with incomplete LBBB, ventricular rate 113.  Received diltiazem 14 mg IV x 1, now with rate better controlled, 90s.  she was on home Tikosyn 250 mcg po & low dose carvedilol.  QTc 505 ms. She usually goes to [address] [place] [address] and sees Dr Neto Miranda labeled as VT in patient's chart, but more consistent with AFL/AF with RVR.     K 3.8, Mg 1.9, Na 134.  Cr WNL. BP controlled. Previous:   AF diagnosed >8 yrs ago.  Followed by EP at [address] [place] [address] prior cardioversions, last approx 3 yrs ago. She said she had a good success with it    Long QT on Tikosyn 500 mcg dose. Bronchiectasis. Cardiac cath in 2010 without significant CAD. Primary cardiologist is Dr. Liseth Mosher.     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 NN reviewed document with patient & patient denies changes to the document.          Hyperkalemia ICD-10-CM: E87.5   ICD-9-CM: 276.7 12/20/2013     PVC (premature ventricular contraction) ICD-10-CM: I49.3   ICD-9-CM: 427.69 12/17/2013     Fatigue ICD-10-CM: R53.83   ICD-9-CM: 780.79 12/17/2013     Hypertension, essential, benign ICD-10-CM: I10 ICD-9-CM: 401.1 8/26/2013     DNR (do not resuscitate) ICD-10-CM: Z66   ICD-9-CM: V49.86 5/19/2011   Overview Signed 5/19/2011 10:23 AM by Claudette Blancas MD     5/19/11         Lung nodule ICD-10-CM: R91.1   ICD-9-CM: 793.11 10/1/2009   Overview Signed 10/1/2009 11:53 AM by Claudette Blancas MD     RLL 1 cm-low activity on pet scan         Breast cancer [facility]) ICD-10-CM: C50.919   ICD-9-CM: 174.9 3/18/2009   Overview Signed 3/18/2009 10:51 AM by Sebas Dowling     XRT;         [facility] ICD-10-CM: I82.409   ICD-9-CM: 453.40 3/18/2009   Overview Signed 3/18/2009 10:52 AM by Sebas Dowling     Left Leg; ? Tamoxifen         Rectal polyp ICD-10-CM: K62.1   ICD-9-CM: 569.0 3/18/2009   Overview Signed 3/18/2009 10:52 AM by Sebas Dowling     -adenocarcinoma Stage 1         Osteoporosis ICD-10-CM: M81.0   ICD-9-CM: 733.00 3/18/2009     Mixed hyperlipidemia (Chronic) ICD-10-CM: W73.1   ICD-9-CM: 272.2 3/18/2009     Allergic rhinitis ICD-10-CM: J30.9   ICD-9-CM: 477.9 3/18/2009           Current Facility-Administered Medications   Medication Dose Route Frequency Provider Last Rate Last Admin   · benzonatate (TESSALON) capsule 100 mg 100 mg Oral TID PRN ZBIGNIEW Nguyen MD   · albuterol-ipratropium (DUO-NEB) 2.5 MG-0.5 MG/3 ML 3 mL Nebulization Q6H PRN ERNESTO Nguyen MD   · magnesium oxide (MAG-OX) tablet 400 mg 400 mg Oral BID Anjana Nguyen  mg at 05/26/21 1030   · potassium chloride SR (KLOR-CON 10) tablet 20 mEq 20 mEq Oral DAILY Michelle No B, NP 20 mEq at 05/26/21 1030   · dilTIAZem (CARDIZEM) 125 mg in dextrose 5% 125 mL infusion 0-15 mg/hr IntraVENous TITRATE Konstantin Areans DO Held at 05/25/21 0910   · apixaban (ELIQUIS) tablet 2.5 mg 2.5 mg Oral BID Aiden Ramirez MD 2.5 mg at 05/26/21 0832   · atorvastatin (LIPITOR) tablet 10 mg 10 mg Oral QHS Aiden Ramirez MD 10 mg at 05/25/21 2031   · carvediloL (COREG) tablet 3.125 mg 3.125 mg Oral BID WITH MEALS Catarino Patrick MD 3.125 mg at 05/26/21 1547   · sodium chloride (NS) flush 5-40 mL 5-40 mL IntraVENous Q8H Catarino Patrick MD 10 mL at 05/25/21 2200   · sodium chloride (NS) flush 5-40 mL 5-40 mL IntraVENous PRN Catarino Patrick MD 10 mL at 05/25/21 1243   · acetaminophen (TYLENOL) tablet 650 mg 650 mg Oral Q4H PRN Catarino Patrick MD   · furosemide (LASIX) injection 20 mg 20 mg IntraVENous BID Catarino Patrick MD 20 mg at 05/26/21 9516     Allergies   Allergen Reactions   · Ciprofloxacin Hives     Past Medical History:   Diagnosis Date   · 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 Tamoxifen   · Essential hypertension   · Lung nodule 10/1/2009   · Mixed hyperlipidemia 3/18/2009   · Paroxysmal A-fib (HCC)   · Paroxysmal atrial fibrillation ([facility] 11/14/2019   Dr Vinh Dobbs   · Rectal polyp 3/18/2009   -adenocarcinoma Stage 1     Past Surgical History:   Procedure Laterality Date   · CARDIOVERSION EXTERNAL 9/18/2013     · ENDOSCOPY, COLON, DIAGNOSTIC   8/09 neg   · HX BREAST LUMPECTOMY Right   · HX COLONOSCOPY   · HX ENDOSCOPY   · HX GI   colon resection   · HX GI   hemorrhoidectomy   · HX ORTHOPAEDIC   Mortons neuroma left foot   · HX PELVIC LAPAROSCOPY   ovarian cystectomy   · HX TONSILLECTOMY     Family History   Problem Relation Age of Onset   · Stroke Father   · Stroke Brother     Social History     Tobacco Use   · Smoking status: Never Smoker   · Smokeless tobacco: Never Used   Substance Use Topics   · Alcohol use: Yes   Alcohol/week: 1.7 standard drinks   Types: 2 Glasses of wine per week   Comment: occasional         Review of Systems: Review of all other systems otherwise negative. Constitutional: Negative for fever, chills, weight loss, + malaise/fatigue. HEENT: Negative for nosebleeds, vision changes.    Respiratory: Negative for cough, hemoptysis.  Bronchiectasis, followed by [facility] pulmonology. Cardiovascular: Negative for chest pain, palpitations, orthopnea, claudication, leg swelling, syncope, and PND. + GURROLA   Gastrointestinal: Negative for nausea, vomiting, diarrhea, blood in stool and melena. Genitourinary: Negative for dysuria, and hematuria. Musculoskeletal: Negative for myalgias, arthralgia. Skin: Negative for rash. Heme: Does not bleed or bruise easily. Neurological: Negative for speech change and focal weakness       Objective:     Visit Vitals   /69 (BP 1 Location: Left arm, BP Patient Position: At rest)   Pulse 94   Temp 98.5 °F (36.9 °C)   Resp 19   Ht 5' 5\" (1.651 m)   Wt 122 lb 11.2 oz (55.7 kg)   LMP (LMP Unknown)   SpO2 95%   BMI 20.42 kg/m²       Physical Exam:   Constitutional: thin frail. No respiratory distress. Head: Normocephalic and atraumatic. Eyes: Pupils are equal, round   ENT: Hearing grossly normal   Neck: Supple. No JVD present. Cardiovascular: Normal rate, irregular rhythm. Exam reveals no gallop and no friction rub. 2/6 systolic murmur heard. Pulmonary/Chest: Barrel chest.   Abdominal: Soft, no tenderness. Musculoskeletal: Moves extremities independently.  Normal gait. Vasc/lymphatic: No edema. Neurological: Alert,oriented. Skin: Skin is warm and dry   Psychiatric: Normal mood and affect. Behavior is normal. Judgment and thought content normal.         Assessment/Plan:     Imaging/Studies:   Echo (05/25/2021): LVEF 55-60%.  Severely dilated RA, mod dilated LA.  Mild to mod MR.  Mild to mod TR.  Mild PH. Holter (10/23/2017): Predominant rhythm AF  bpm.  Rare RVR.  BBB.  Frequent PVCs, rare ventricular couplets, triplets, bigeminy, & trigeminy.  Rare NSVT.         AF/AFL with RVR: not VT.  Rate was better controlled after diltiazem.  But she wants cardioversion and does not want to stay in rate control  With patient's known ZOË, mild to mod MR, & chronic pulmonary problems, unlikely that NSR would be successful long term but she said it has lasted 3 years and she wants it again        Discussed risks/benefits of DCCV & continued Tikosyn therapy but dose would need to be reduced. With Tikosyn, maintain K>4 & Mg>2. Anticoagulation: Continue Eliquis for embolic CVA prophylaxis.  On low dose, is 3 months away from being 81 & weight <60 kg. She confirms no missed doses in the past few weeks. Thank you for involving me in this patient's care and please call with further concerns or questions. Lisa Bhat M.D.    Electrophysiology/Cardiology   [facility] [place]   [facility], [address] [facility] [address] [id number] [facility] [place], [address] [place] [address]   [phone]

## 2021-05-26 NOTE — PROGRESS NOTES
TRANSFER - IN REPORT:    Verbal report received from [facility] CVT on Geanie Mediate Day  being received from procedure area for routine progression of care. Report consisted of patients Situation, Background, Assessment and Recommendations(SBAR). Information from the following report(s) SBAR, Procedure Summary, MAR, Recent Results and Cardiac Rhythm NSR was reviewed with the receiving clinician. Opportunity for questions and clarification was provided. Assessment completed upon patients arrival to [address] [place] [address] and care assumed. Cardiac Cath Lab Recovery Arrival Note:    Geanie Mediate Day arrived to [facility] recovery area. Patient procedure= Cardioversion. Patient on cardiac monitor, non-invasive blood pressure, SPO2 monitor. On  O2 @ 2 lpm via NC.  IV  of NS on pump at 25 ml/hr. Patient status doing well without problems. Patient is A&Ox 4. Patient reports No Pain. PROCEDURE SITE CHECK:    Procedure site: No pain/discomfort reported at procedure site. No change in patient status. Continue to monitor patient and status.

## 2021-05-26 NOTE — PROGRESS NOTES
Flor Leone     1940      Anai Ho MD    Date of Visit-5/26/2021   PCP is Zion Santiago MD   [facility] [place]  [facility]  HPI:  Flor Leone is a 79 y.o. female   Subjective:   F/u  atrial fibrillation previously seen by Dr. Jenifer López   She was on a lower dose of Eliquis because she'd had some GYN bleeding previously. She saw Dr. Jeffrey Poole of pulmonary at [facility] noting treatment for atypical mycobacterium infection based on CT criteria with no specific therapy. Dr. Jeffrey Poole recommended that she return to sinus rhythm and referred her to Dr. Edu Collazo at [facility]. She has seen her recently. She has had 2 cardioversions but back in aflutter again. Once with po tikosyn and once with cardioversion. Dr. Arthur Shaw said she was not a candidate for ablation. She could not get in to see Dr. Arthur Shaw to address this acute episode of atrial fibrillation so came into the hospital for control. Seen in [place] yesterday. Back in afib, woke her up, thinks it started last week. Hx of metoprolol dofetalide(long QT on that) had to lower the dose and now on it with the coreg. She came in with the shortness of breath and cough starting up, kept thinking the afib was going to go away but it never did. Sunday getting faint and dizzy. Thinks it is all afib. So we discussed options for cardioversion given the timing of things yesterday she elected to take an extra Tikosyn dose as that is worked in the past.  It did not work to convert her. Yesterday evening, a rapid response was called due to acute decompensation. All of a sudden her heart rate went faster and the telemetry monitor was reading ventricular tachycardia. She became acutely hypotensive and near syncopal and was laid in the bed where she was given a bolus of normal saline. Over time her blood pressure and her mental status did recover without any additional interventions.   Nursing was able to run several EKGs for me which I reviewed and revealed that she was not in V. tach but in fact having A. fib with RVR. Given the difficulties in managing her A. fib with RVR she would normally be best served by ablation but Dr. Sammy Zepeda has already told her she is not a candidate for that I do not have those details available to me at the moment. She did not respond to additional Tikosyn therapy and other antiarrhythmics are relatively contraindicated. So I have also asked Dr. Lluvia Olsen to see her today for additional guidance in managing this difficult issue. At this time we are limited with treatment options because of her multiple comorbidities frailty and low blood pressure. She probably should have cardioversion this afternoon and is agreeable to that she is n.p.o. in anticipation but I will wait for Dr. Cody Hickman before making final decision. Hx also of seeing Jennifer Anderson, Dr. Anthony Booker, having a previous history of breast cancer and adenocarcinoma. She was seen by GYN 05/24/18 with a histography, polypectomy and D&C done at that time as an outpatient. Pulmonary nodules CT follow-up   MRI for Thoracic surgery at [facility] every 6 months  Saw Dr Regan RIOS [facility] Dec 2018 with ACE added for high BP      Assessment/Plan:     1. Persistent atrial fibrillation. Now with RVR  -----prevent the risk of stroke- lower dose of Eliquis seems to be an appropriate balance between bleeding risk and stroke risk.  ----on Dofetilide ,repeat cardioversion 04/02/18, in the interim, awaiting EP eval today, plan for cardioversion this afternoon n.p.o. until then  2. 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 HTN -- bp at goal for age  3. PVCs keep K above 4 mag above 2  5. Pulmonary nodules, followed at [facility]. History of cancer in the colon, adenocarcinoma, breast cancer. 6. Dyslipidemia, on Atorvastatin. 7. Acute HFpEF related to A. fib with RVR  8. -No diuresis secondary to hypotension  9. Body mass index is 20.42 kg/m².   Try to keep her weight up      Future Appointments   Date Time Provider Corky Mann   5/27/2021  7:30 AM Thais Dominguez MD [facility] BS AMB   10/21/2021  7:30 AM Thais Dominguez MD [facility] BS AMB      Stable fu one year  Key CAD CHF Meds             atorvastatin (LIPITOR) 10 mg tablet TAKE 1/2 TABLET BY MOUTH EVERY DAY    valsartan (DIOVAN) 320 mg tablet TAKE 1 TABLET BY MOUTH DAILY    apixaban (Eliquis) 2.5 mg tablet Take 1 Tab by mouth two (2) times a day. carvediloL (COREG) 3.125 mg tablet TAKE 1 TABLET BY MOUTH TWICE DAILY. STOP METOPROLOL    amLODIPine (NORVASC) 5 mg tablet Take 1.5 Tabs by mouth daily. dofetilide (TIKOSYN) 500 mcg capsule Take 250 mcg by mouth two (2) times a day. Impression:   1. Atrial fibrillation with RVR ([address]    2. Congestive heart failure, unspecified HF chronicity, unspecified heart failure type (HCC)    3. Elevated brain natriuretic peptide (BNP) level       Cardiac History:   MAGALI 9-18-13=EF 55-60, mild MR  CV 9-18-13-sucessful with 150 J one shock  ECHO 8-26-13=mild dilated RV, 2+ ZOË, 2+ MR,TR, mild pulm HTN  ECHO 3-= EF 65% ZOË,mild MR,TR  CATH 4-= normal cors, ef 60%     ROS-except as noted above. . A complete cardiac and respiratory are reviewed and negative except as above ; Resp-denies wheezing  or productive cough,. 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 Tamoxifen     Essential hypertension     Lung nodule 10/1/2009    Mixed hyperlipidemia 3/18/2009    Paroxysmal A-fib (HCC)     Paroxysmal atrial fibrillation ([address] 11/14/2019    Dr Eladio Hayden Rectal polyp 3/18/2009 -adenocarcinoma Stage 1       Social Hx= reports that she has never smoked. She has never used smokeless tobacco. She reports current alcohol use of about 1.7 standard drinks of alcohol per week. She reports that she does not use drugs. Exam and Labs:  /69 (BP 1 Location: Left arm, BP Patient Position: At rest)   Pulse 94   Temp 98.5 °F (36.9 °C)   Resp 19   Ht 5' 5\" (1.651 m)   Wt 122 lb 11.2 oz (55.7 kg)   LMP  (LMP Unknown)   SpO2 95%   BMI 20.42 kg/m² Constitutional:  NAD, comfortable  Head: NC,AT. Eyes: No scleral icterus. Neck:  Neck supple. No JVD present. Throat: moist mucous membranes. Chest: Effort normal & normal respiratory excursion . Neurological: alert, conversant and oriented . Skin: Skin is not cold. No obvious systemic rash noted. Not diaphoretic. No erythema. Psychiatric:  Grossly normal mood and affect. Behavior appears normal. Extremities:  no clubbing or cyanosis. Abdomen: non distended    Lungs: Scattered crackles  Heart: Irregular tachycardic , PMI non displaced. Edema: Edema is none.        Wt Readings from Last 3 Encounters:   05/26/21 122 lb 11.2 oz (55.7 kg)   04/23/21 124 lb 12.8 oz (56.6 kg)   01/07/21 125 lb 6.4 oz (56.9 kg)      BP Readings from Last 3 Encounters:   05/26/21 117/69   04/23/21 128/82   01/07/21 (!) 142/76      Current Facility-Administered Medications   Medication Dose Route Frequency    benzonatate (TESSALON) capsule 100 mg  100 mg Oral TID PRN    albuterol-ipratropium (DUO-NEB) 2.5 MG-0.5 MG/3 ML  3 mL Nebulization Q6H PRN    magnesium oxide (MAG-OX) tablet 400 mg  400 mg Oral BID    potassium chloride SR (KLOR-CON 10) tablet 20 mEq  20 mEq Oral DAILY    dilTIAZem (CARDIZEM) 125 mg in dextrose 5% 125 mL infusion  0-15 mg/hr IntraVENous TITRATE    apixaban (ELIQUIS) tablet 2.5 mg  2.5 mg Oral BID    atorvastatin (LIPITOR) tablet 10 mg  10 mg Oral QHS    carvediloL (COREG) tablet 3.125 mg  3.125 mg Oral BID WITH MEALS    sodium chloride (NS) flush 5-40 mL  5-40 mL IntraVENous Q8H    sodium chloride (NS) flush 5-40 mL  5-40 mL IntraVENous PRN    acetaminophen (TYLENOL) tablet 650 mg  650 mg Oral Q4H PRN    furosemide (LASIX) injection 20 mg  20 mg IntraVENous BID      Impression see above.

## 2021-05-26 NOTE — PROGRESS NOTES
TRANSFER - OUT REPORT:    Verbal report given to Shivani Goddard RN on Jaun Hinds Day being transferred to Room 433 for routine progression of care       Report consisted of patients Situation, Background, Assessment and   Recommendations(SBAR). Information from the following report(s) SBAR, MAR, Recent Results and Cardiac Rhythm NSR was reviewed with the receiving nurse. Opportunity for questions and clarification was provided.

## 2021-05-27 LAB
ANION GAP SERPL CALC-SCNC: 8 MMOL/L (ref 5–15)
APPEARANCE UR: CLEAR
ATRIAL RATE: 279 BPM
ATRIAL RATE: 79 BPM
ATRIAL RATE: 90 BPM
BACTERIA URNS QL MICRO: ABNORMAL /HPF
BASOPHILS # BLD: 0 K/UL (ref 0–0.1)
BASOPHILS NFR BLD: 0 % (ref 0–1)
BILIRUB UR QL: NEGATIVE
BUN SERPL-MCNC: 18 MG/DL (ref 6–20)
BUN/CREAT SERPL: 25 (ref 12–20)
CALCIUM SERPL-MCNC: 9.2 MG/DL (ref 8.5–10.1)
CALCULATED P AXIS, ECG09: 84 DEGREES
CALCULATED P AXIS, ECG09: 86 DEGREES
CALCULATED R AXIS, ECG10: -152 DEGREES
CALCULATED R AXIS, ECG10: -52 DEGREES
CALCULATED R AXIS, ECG10: -58 DEGREES
CALCULATED T AXIS, ECG11: -15 DEGREES
CALCULATED T AXIS, ECG11: 85 DEGREES
CALCULATED T AXIS, ECG11: 92 DEGREES
CHLORIDE SERPL-SCNC: 104 MMOL/L (ref 97–108)
CO2 SERPL-SCNC: 20 MMOL/L (ref 21–32)
COLOR UR: ABNORMAL
CREAT SERPL-MCNC: 0.73 MG/DL (ref 0.55–1.02)
DIAGNOSIS, 93000: NORMAL
DIFFERENTIAL METHOD BLD: NORMAL
EOSINOPHIL # BLD: 0.1 K/UL (ref 0–0.4)
EOSINOPHIL NFR BLD: 1 % (ref 0–7)
EPITH CASTS URNS QL MICRO: ABNORMAL /LPF
ERYTHROCYTE [DISTWIDTH] IN BLOOD BY AUTOMATED COUNT: 13.2 % (ref 11.5–14.5)
GLUCOSE BLD STRIP.AUTO-MCNC: 85 MG/DL (ref 65–117)
GLUCOSE BLD STRIP.AUTO-MCNC: 96 MG/DL (ref 65–117)
GLUCOSE SERPL-MCNC: 85 MG/DL (ref 65–100)
GLUCOSE UR STRIP.AUTO-MCNC: NEGATIVE MG/DL
HCT VFR BLD AUTO: 39.8 % (ref 35–47)
HGB BLD-MCNC: 12.9 G/DL (ref 11.5–16)
HGB UR QL STRIP: NEGATIVE
IMM GRANULOCYTES # BLD AUTO: 0 K/UL (ref 0–0.04)
IMM GRANULOCYTES NFR BLD AUTO: 0 % (ref 0–0.5)
KETONES UR QL STRIP.AUTO: NEGATIVE MG/DL
LACTATE SERPL-SCNC: 1 MMOL/L (ref 0.4–2)
LEUKOCYTE ESTERASE UR QL STRIP.AUTO: ABNORMAL
LYMPHOCYTES # BLD: 2.6 K/UL (ref 0.8–3.5)
LYMPHOCYTES NFR BLD: 32 % (ref 12–49)
MAGNESIUM SERPL-MCNC: 2.2 MG/DL (ref 1.6–2.4)
MAGNESIUM SERPL-MCNC: 2.4 MG/DL (ref 1.6–2.4)
MCH RBC QN AUTO: 30.1 PG (ref 26–34)
MCHC RBC AUTO-ENTMCNC: 32.4 G/DL (ref 30–36.5)
MCV RBC AUTO: 93 FL (ref 80–99)
MONOCYTES # BLD: 0.9 K/UL (ref 0–1)
MONOCYTES NFR BLD: 11 % (ref 5–13)
NEUTS SEG # BLD: 4.4 K/UL (ref 1.8–8)
NEUTS SEG NFR BLD: 56 % (ref 32–75)
NITRITE UR QL STRIP.AUTO: NEGATIVE
NRBC # BLD: 0 K/UL (ref 0–0.01)
NRBC BLD-RTO: 0 PER 100 WBC
P-R INTERVAL, ECG05: 206 MS
PH UR STRIP: 5.5 [PH] (ref 5–8)
PLATELET # BLD AUTO: 164 K/UL (ref 150–400)
PMV BLD AUTO: 10.8 FL (ref 8.9–12.9)
POTASSIUM SERPL-SCNC: 4.5 MMOL/L (ref 3.5–5.1)
PROT UR STRIP-MCNC: NEGATIVE MG/DL
Q-T INTERVAL, ECG07: 396 MS
Q-T INTERVAL, ECG07: 434 MS
Q-T INTERVAL, ECG07: 590 MS
QRS DURATION, ECG06: 128 MS
QRS DURATION, ECG06: 132 MS
QRS DURATION, ECG06: 132 MS
QTC CALCULATION (BEZET), ECG08: 497 MS
QTC CALCULATION (BEZET), ECG08: 510 MS
QTC CALCULATION (BEZET), ECG08: 733 MS
RBC # BLD AUTO: 4.28 M/UL (ref 3.8–5.2)
RBC #/AREA URNS HPF: ABNORMAL /HPF (ref 0–5)
SERVICE CMNT-IMP: NORMAL
SERVICE CMNT-IMP: NORMAL
SODIUM SERPL-SCNC: 132 MMOL/L (ref 136–145)
SP GR UR REFRACTOMETRY: 1.01 (ref 1–1.03)
UROBILINOGEN UR QL STRIP.AUTO: 0.2 EU/DL (ref 0.2–1)
VENTRICULAR RATE, ECG03: 100 BPM
VENTRICULAR RATE, ECG03: 79 BPM
VENTRICULAR RATE, ECG03: 93 BPM
WBC # BLD AUTO: 8 K/UL (ref 3.6–11)
WBC URNS QL MICRO: ABNORMAL /HPF (ref 0–4)

## 2021-05-27 PROCEDURE — 83605 ASSAY OF LACTIC ACID: CPT

## 2021-05-27 PROCEDURE — 74011250637 HC RX REV CODE- 250/637: Performed by: HOSPITALIST

## 2021-05-27 PROCEDURE — 85025 COMPLETE CBC W/AUTO DIFF WBC: CPT

## 2021-05-27 PROCEDURE — 74011250637 HC RX REV CODE- 250/637: Performed by: NURSE PRACTITIONER

## 2021-05-27 PROCEDURE — 93005 ELECTROCARDIOGRAM TRACING: CPT

## 2021-05-27 PROCEDURE — 74011250637 HC RX REV CODE- 250/637: Performed by: FAMILY MEDICINE

## 2021-05-27 PROCEDURE — 82962 GLUCOSE BLOOD TEST: CPT

## 2021-05-27 PROCEDURE — 80048 BASIC METABOLIC PNL TOTAL CA: CPT

## 2021-05-27 PROCEDURE — 81001 URINALYSIS AUTO W/SCOPE: CPT

## 2021-05-27 PROCEDURE — 99233 SBSQ HOSP IP/OBS HIGH 50: CPT | Performed by: INTERNAL MEDICINE

## 2021-05-27 PROCEDURE — 74011250636 HC RX REV CODE- 250/636: Performed by: FAMILY MEDICINE

## 2021-05-27 PROCEDURE — 83735 ASSAY OF MAGNESIUM: CPT

## 2021-05-27 PROCEDURE — 99232 SBSQ HOSP IP/OBS MODERATE 35: CPT | Performed by: SPECIALIST

## 2021-05-27 PROCEDURE — 36415 COLL VENOUS BLD VENIPUNCTURE: CPT

## 2021-05-27 PROCEDURE — 65660000001 HC RM ICU INTERMED STEPDOWN

## 2021-05-27 RX ORDER — SODIUM CHLORIDE 9 MG/ML
75 INJECTION, SOLUTION INTRAVENOUS CONTINUOUS
Status: DISCONTINUED | OUTPATIENT
Start: 2021-05-27 | End: 2021-05-28 | Stop reason: HOSPADM

## 2021-05-27 RX ORDER — AMLODIPINE BESYLATE 5 MG/1
5 TABLET ORAL DAILY
Status: DISCONTINUED | OUTPATIENT
Start: 2021-05-27 | End: 2021-05-28 | Stop reason: HOSPADM

## 2021-05-27 RX ORDER — DOFETILIDE 0.12 MG/1
250 CAPSULE ORAL EVERY 12 HOURS
Status: DISCONTINUED | OUTPATIENT
Start: 2021-05-27 | End: 2021-05-28 | Stop reason: HOSPADM

## 2021-05-27 RX ADMIN — DOFETILIDE 250 MCG: 0.12 CAPSULE ORAL at 21:01

## 2021-05-27 RX ADMIN — APIXABAN 2.5 MG: 2.5 TABLET, FILM COATED ORAL at 09:25

## 2021-05-27 RX ADMIN — AMLODIPINE BESYLATE 5 MG: 5 TABLET ORAL at 16:02

## 2021-05-27 RX ADMIN — Medication 10 ML: at 16:02

## 2021-05-27 RX ADMIN — MAGNESIUM OXIDE TAB 400 MG (241.3 MG ELEMENTAL MG) 400 MG: 400 (241.3 MG) TAB at 09:25

## 2021-05-27 RX ADMIN — DOFETILIDE 250 MCG: 0.12 CAPSULE ORAL at 09:25

## 2021-05-27 RX ADMIN — ACETAMINOPHEN 650 MG: 325 TABLET ORAL at 07:09

## 2021-05-27 RX ADMIN — Medication 10 ML: at 22:00

## 2021-05-27 RX ADMIN — MAGNESIUM OXIDE TAB 400 MG (241.3 MG ELEMENTAL MG) 400 MG: 400 (241.3 MG) TAB at 16:47

## 2021-05-27 RX ADMIN — ATORVASTATIN CALCIUM 10 MG: 10 TABLET, FILM COATED ORAL at 21:01

## 2021-05-27 RX ADMIN — APIXABAN 2.5 MG: 2.5 TABLET, FILM COATED ORAL at 21:00

## 2021-05-27 RX ADMIN — CARVEDILOL 3.12 MG: 3.12 TABLET, FILM COATED ORAL at 16:47

## 2021-05-27 RX ADMIN — POTASSIUM CHLORIDE 20 MEQ: 750 TABLET, EXTENDED RELEASE ORAL at 09:25

## 2021-05-27 RX ADMIN — FUROSEMIDE 20 MG: 10 INJECTION, SOLUTION INTRAMUSCULAR; INTRAVENOUS at 09:25

## 2021-05-27 RX ADMIN — ACETAMINOPHEN 650 MG: 325 TABLET ORAL at 21:01

## 2021-05-27 RX ADMIN — Medication 10 ML: at 07:06

## 2021-05-27 RX ADMIN — CARVEDILOL 3.12 MG: 3.12 TABLET, FILM COATED ORAL at 09:25

## 2021-05-27 RX ADMIN — SODIUM CHLORIDE 75 ML/HR: 9 INJECTION, SOLUTION INTRAVENOUS at 09:26

## 2021-05-27 NOTE — PROGRESS NOTES
Transitions of Care: 15% risk of re-admission      -Anticipate discharge home when stable, will need transportation, patient previously endorsed agreement with Hospital to Home transportation    -PCP and Cardiology follow-up      The CM spoke with RN, LASHANDA if patient will discharge today vs. Tomorrow- will need hospital to home transportation home, patient previously endorsed she would be agreeable for paying OOP. The RN endorses that patient is ambulatory, no mobility concerns at this time. The patient will need 2nd IM letter prior to discharge.      Williams Griffin, GERALD

## 2021-05-27 NOTE — PROGRESS NOTES
He has LBBB so QTc goal is less than 550 ms  She wants to be back on tikosyn 250 mcg bid since she had recurrent PAF overnight and wants to go home and take her tikosyn again

## 2021-05-27 NOTE — PROGRESS NOTES
[facility]                                                                                          Hospitalist Progress Note  Odin Ervin MD  Answering service: [phone] OR [phone] from in house phone        Date of Service:  2021  NAME:  Tarsha Holloway Day  :  1940  MRN:  [id number]    This documentation was facilitated by a Voice Recognition software and may contain inadvertent typographical errors. Admission Summary:   Admitted for AFIB w/RVR when she presented with palpitation and SOB. PMH significant for afib,bronchientasis,breast cancer. She was recently diagnosed with pneumonia on  and treated with one week of doxycycline   Patient has followed at [facility] for breast cancer, bronchiectasis per patient  She sees pulmonary at [facility] for MAC per records,but herself not sure. She said they recommended 2 years of antibiotics she is reluctant to accept. She also sees EP cards at [facility]. Interval history / Subjective:        Patient denies any complaints or problems, eager to go home  Assessment & Plan:   Persistent afib with RVR. Previously failed chemical and electrical cardioversion   Currently in sinus rhythm  -Status post cardioversion 2021  -Cardizem gtt. discontinued  -On coreg and Tikosyn at home.  -Would like to resume Tikosyn in spite of the associated risk  -If to want to continue Tikosyn, should keep potassium greater than 4 and magnesium greater than 2  -On apixaban for stroke prevention  -TTE normal EF,diastolic function. Metabolic acidosis  Unclear etiology, gentle IV hydration, repeat labs in the morning, monitor      HTN, blood pressure poorly controlled, will add Norvasc 5 mg p.o. daily  Dyslipidemia: continue atorvastatin    Bronchiectasis,pul nodule? MAC infection  -Follows at [facility]. She sees Dr Mauricio Beckham at [facility]  -nebs and cough meds as needed     H/o Breast cancer        Code status: full  DVT prophylaxis: [address] discussed with: Patient/Family and Nurse  Anticipated Disposition: Home w/Family  Anticipated Discharge: 24 hours to 48 hours  Hospital Problems  Date Reviewed: 5/26/2021        Codes Class Noted POA    Atrial fibrillation with RVR [facility]) ICD-10-CM: I48.91  ICD-9-CM: 427.31  5/25/2021 Unknown                Review of Systems:   A comprehensive review of systems was negative except for that written in the HPI. Vital Signs:    Last 24hrs VS reviewed since prior progress note. Most recent are:  Visit Vitals  BP (!) 151/64   Pulse 96   Temp 98.2 °F (36.8 °C)   Resp 18   Ht 5' 5\" (1.651 m)   Wt 53.2 kg (117 lb 3.2 oz)   SpO2 94%   BMI 19.50 kg/m²         Intake/Output Summary (Last 24 hours) at 5/27/2021 1055  Last data filed at 5/27/2021 0859  Gross per 24 hour   Intake 240 ml   Output    Net 240 ml        Physical Examination:     I had a face to face encounter with this patient and independently examined them on5/27/2021 as outlined below:        Constitutional:  No acute distress, cooperative, pleasant    HEENT:  Atraumatic. Oral mucosa moist,. Non icteric sclera. No pallor. Resp:  CTA bilaterally. No wheezing/rhonchi/rales. No accessory muscle use   Chest Wall: No deformity   CV:  S1-S2 were heard    GI:  Soft, non distended, non tender. normoactive bowel sounds, no hepatosplenomegaly    :  No CVA or suprapubic tenderness    Musculoskeletal:  No edema.     Neurologic:  Mental status:AAOx3,   Cranial nerves II-XII : WNL  Motor exam:Moves all extremities symmetrically              Data Review:    Review and/or order of clinical lab test  Review and/or order of tests in the radiology section of CPT  Review and/or order of tests in the medicine section of CPT      Labs:     Recent Labs     05/26/21 0446 05/25/21  1900   WBC 6.2 7.7   HGB 10.5* 12.0   HCT 32.2* 38.2   * 156     Recent Labs     05/27/21  0937 05/27/21  0319 05/26/21  0446 05/25/21  1900   NA  --  132* 137 134*   K  --  4.5 3.4* 3.8   CL  --  104 105 103 CO2  --  20* 21 21   BUN  --  18 12 14   CREA  --  0.73 0.57 0.76   GLU  --  85 70 91   CA  --  9.2 8.6 8.8   MG 2.2 2.4  --  1.9     Recent Labs     05/25/21  0830   ALT 38   *   TBILI 0.6   TP 7.5   ALB 3.9   GLOB 3.6     No results for input(s): INR, PTP, APTT, INREXT, INREXT in the last 72 hours. No results for input(s): FE, TIBC, PSAT, FERR in the last 72 hours. No results found for: FOL, RBCF   No results for input(s): PH, PCO2, PO2 in the last 72 hours.   Recent Labs     05/26/21  0446 05/25/21  1730 05/25/21  0830   CPK  --  60  58 52   TROIQ <0.05 <0.05 <0.05     Lab Results   Component Value Date/Time    Cholesterol, total 139 04/30/2021 08:24 AM    HDL Cholesterol 63 04/30/2021 08:24 AM    LDL, calculated 62 04/30/2021 08:24 AM    LDL, calculated 74.6 10/26/2020 04:01 PM    Triglyceride 68 04/30/2021 08:24 AM    CHOL/HDL Ratio 2.5 10/26/2020 04:01 PM     No results found for: [facility]  Lab Results   Component Value Date/Time    Color YELLOW/STRAW 05/25/2021 12:46 PM    Appearance CLEAR 05/25/2021 12:46 PM    Specific gravity 1.009 05/25/2021 12:46 PM    Specific gravity 1.015 08/24/2013 04:10 AM    pH (UA) 6.5 05/25/2021 12:46 PM    Protein Negative 05/25/2021 12:46 PM    Glucose Negative 05/25/2021 12:46 PM    Ketone 15 (A) 05/25/2021 12:46 PM    Bilirubin Negative 05/25/2021 12:46 PM    Urobilinogen 0.2 05/25/2021 12:46 PM    Nitrites Negative 05/25/2021 12:46 PM    Leukocyte Esterase Negative 05/25/2021 12:46 PM    Epithelial cells FEW 05/25/2021 12:46 PM    Bacteria Negative 05/25/2021 12:46 PM    WBC 0-4 05/25/2021 12:46 PM    RBC 0-5 05/25/2021 12:46 PM         Medications Reviewed:     Current Facility-Administered Medications   Medication Dose Route Frequency    dofetilide (TIKOSYN) capsule 250 mcg  250 mcg Oral Q12H    0.9% sodium chloride infusion  75 mL/hr IntraVENous CONTINUOUS    benzonatate (TESSALON) capsule 100 mg  100 mg Oral TID PRN    albuterol-ipratropium (DUO-NEB) 2.5 MG-0.5 MG/3 ML  3 mL Nebulization Q6H PRN    magnesium oxide (MAG-OX) tablet 400 mg  400 mg Oral BID    potassium chloride SR (KLOR-CON 10) tablet 20 mEq  20 mEq Oral DAILY    apixaban (ELIQUIS) tablet 2.5 mg  2.5 mg Oral BID    atorvastatin (LIPITOR) tablet 10 mg  10 mg Oral QHS    carvediloL (COREG) tablet 3.125 mg  3.125 mg Oral BID WITH MEALS    sodium chloride (NS) flush 5-40 mL  5-40 mL IntraVENous Q8H    sodium chloride (NS) flush 5-40 mL  5-40 mL IntraVENous PRN    acetaminophen (TYLENOL) tablet 650 mg  650 mg Oral Q4H PRN    furosemide (LASIX) injection 20 mg  20 mg IntraVENous BID     ______________________________________________________________________  EXPECTED LENGTH OF STAY: 3d 12h  ACTUAL LENGTH OF STAY:          2                 Anni Gillespie MD

## 2021-05-27 NOTE — PROGRESS NOTES
Cardiac Electrophysiology Hospital Progress Note     Subjective:      Amisha Humphreys is a 79 y.o. patient who is seen for management of persistent AF. Initial consult was requested by Dr. Do Gonzalez. Admitted 05/25/2021 with increased fatigue & GURROLA, also with occasional \"thump\" in chest, but denied palpitations. ECG in ER showed AFL/AF with incomplete LBBB, ventricular rate 113. Received diltiazem 14 mg IV x 1, now with rate better controlled, 90s. On Tikosyn 250 mcg po bid PTA & low dose carvedilol. Strip labeled as VT in patient's chart, but more consistent with AFL/AF with RVR. Underwent successful DCCV on 05/26/2021. Brief recurrent AF later that evening, but now NSR. She has not yet restarted home Tikosyn. QTc 497 ms on 05/26/2021, but with LBBB.     K 4.5, Mg 2.4, Na 132. Cr WNL. BP controlled. Previous:   AF diagnosed >8 yrs ago. Followed by EP at [facility]. Multiple prior cardioversions, last approx 3 yrs ago; reports good success with it. Long QT on Tikosyn 500 mcg dose. Bronchiectasis. Cardiac cath in 2010 without significant CAD. EP is Dr. Aspen Xiong.     Primary cardiologist is Dr. Mila Aguiar.        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 NN reviewed document with patient & patient denies changes to the document. Hyperkalemia ICD-10-CM: E87.5  ICD-9-CM: 276.7  12/20/2013        PVC (premature ventricular contraction) ICD-10-CM: I49.3  ICD-9-CM: 427.69  12/17/2013        Fatigue ICD-10-CM: R53.83  ICD-9-CM: 780.79  12/17/2013        Hypertension, essential, benign ICD-10-CM: I10  ICD-9-CM: 401.1  8/26/2013        DNR (do not resuscitate) ICD-10-CM: Z66  ICD-9-CM: V49.86  5/19/2011    Overview Signed 5/19/2011 10:23 AM by Reji Morales MD     5/19/11             Lung nodule ICD-10-CM: R91.1  ICD-9-CM: 793.11  10/1/2009    Overview Signed 10/1/2009 11:53 AM by Reji Morales MD     RLL 1 cm-low activity on pet scan             Breast cancer [facility]) ICD-10-CM: C50.919  ICD-9-CM: 174.9  3/18/2009    Overview Signed 3/18/2009 10:51 AM by Lita Ly     XRT;              DVT of leg (deep venous thrombosis) ([facility] [address] ICD-10-CM: I82.409  ICD-9-CM: 453.40  3/18/2009    Overview Signed 3/18/2009 10:52 AM by Lita Ly     Left Leg; ? Tamoxifen             Rectal polyp ICD-10-CM: K62.1  ICD-9-CM: 569.0  3/18/2009    Overview Signed 3/18/2009 10:52 AM by Lita Ly     -adenocarcinoma Stage 1             Osteoporosis ICD-10-CM: M81.0  ICD-9-CM: 733.00  3/18/2009        Mixed hyperlipidemia (Chronic) ICD-10-CM: O65.9  ICD-9-CM: 272.2  3/18/2009        Allergic rhinitis ICD-10-CM: J30.9  ICD-9-CM: 477.9  3/18/2009                Current Facility-Administered Medications   Medication Dose Route Frequency Provider Last Rate Last Admin    dofetilide (TIKOSYN) capsule 250 mcg  250 mcg Oral Q12H Madeline FERREIRA NP        benzonatate (TESSALON) capsule 100 mg  100 mg Oral TID PRN SKYE Nguyen MD        albuterol-ipratropium (DUO-NEB) 2.5 MG-0.5 MG/3 ML  3 mL Nebulization Q6H PRN OLIVIA Nguyen MD        magnesium oxide (MAG-OX) tablet 400 mg  400 mg Oral BID Patrick Sandra Arnold MD   400 mg at 05/26/21 1814    potassium chloride SR (KLOR-CON 10) tablet 20 mEq  20 mEq Oral DAILY Michelle No B, NP   20 mEq at 05/26/21 1030    dilTIAZem (CARDIZEM) 125 mg in dextrose 5% 125 mL infusion  0-15 mg/hr IntraVENous TITRATE Konstantin Arenas DO   Held at 05/25/21 2832    apixaban (ELIQUIS) tablet 2.5 mg  2.5 mg Oral BID Aiden Ramirez MD   2.5 mg at 05/26/21 2250    atorvastatin (LIPITOR) tablet 10 mg  10 mg Oral QHS Aiden Ramirez MD   10 mg at 05/26/21 2250    carvediloL (COREG) tablet 3.125 mg  3.125 mg Oral BID WITH MEALS Aiden Ramirez MD   3.125 mg at 05/26/21 1802    sodium chloride (NS) flush 5-40 mL  5-40 mL IntraVENous Q8H Aiden Ramirez MD   10 mL at 05/27/21 0706    sodium chloride (NS) flush 5-40 mL  5-40 mL IntraVENous PRN Aiden Ramirez MD   10 mL at 05/25/21 1243    acetaminophen (TYLENOL) tablet 650 mg  650 mg Oral Q4H PRN Aiden Ramirez MD   650 mg at 05/27/21 0709    furosemide (LASIX) injection 20 mg  20 mg IntraVENous BID Aiden Ramirez MD   20 mg at 05/26/21 1803     Allergies   Allergen Reactions    Ciprofloxacin Hives     Past Medical History:   Diagnosis Date    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 Tamoxifen     Essential hypertension     Lung nodule 10/1/2009    Mixed hyperlipidemia 3/18/2009    Paroxysmal A-fib (HCC)     Paroxysmal atrial fibrillation ([address] 11/14/2019    Dr Aye Thomas    Rectal polyp 3/18/2009    -adenocarcinoma Stage 1      Past Surgical History:   Procedure Laterality Date    CARDIOVERSION EXTERNAL  9/18/2013         ENDOSCOPY, COLON, DIAGNOSTIC      8/09 neg    HX BREAST LUMPECTOMY Right     HX COLONOSCOPY      HX ENDOSCOPY      HX GI      colon resection    HX GI      hemorrhoidectomy    HX ORTHOPAEDIC      Mortons neuroma left foot    HX PELVIC LAPAROSCOPY      ovarian cystectomy    HX TONSILLECTOMY       Family History   Problem Relation Age of Onset    Stroke Father     Stroke Brother      Social History     Tobacco Use    Smoking status: Never Smoker    Smokeless tobacco: Never Used   Substance Use Topics    Alcohol use: Yes     Alcohol/week: 1.7 standard drinks     Types: 2 Glasses of wine per week     Comment: occasional        Review of Systems: Review of all other systems otherwise negative. Constitutional: Negative for fever, chills, weight loss, + malaise/fatigue. HEENT: Negative for nosebleeds, vision changes. Respiratory: Negative for cough, hemoptysis  Cardiovascular: Negative for chest pain, palpitations, orthopnea, claudication, leg swelling, syncope, and PND. + GURROLA  Gastrointestinal: Negative for nausea, vomiting, diarrhea, blood in stool and melena. Genitourinary: Negative for dysuria, and hematuria. Musculoskeletal: Negative for myalgias, arthralgia. Skin: Negative for rash. Heme: Does not bleed or bruise easily. Neurological: Negative for speech change and focal weakness     Objective:     Visit Vitals  BP (!) 141/62 (BP 1 Location: Left upper arm, BP Patient Position: At rest)   Pulse 80   Temp 97.8 °F (36.6 °C)   Resp 19   Ht 5' 5\" (1.651 m)   Wt 117 lb 3.2 oz (53.2 kg)   LMP  (LMP Unknown)   SpO2 99%   BMI 19.50 kg/m²      Physical Exam:   Constitutional: Well-developed and well-nourished. No respiratory distress. Head: Normocephalic and atraumatic. Eyes: Pupils are equal, round  ENT: Hearing normal  Neck: Supple. No JVD present. Cardiovascular: Normal rate, regular rhythm. Exam reveals no gallop and no friction rub. 2/6 systolic murmur heard. Pulmonary/Chest: Effort normal and breath sounds normal. No wheezes. Abdominal: Soft, no tenderness. Musculoskeletal: Moves extremities independently. Vasc/lymphatic: No edema. Neurological: Alert,oriented. Skin: Skin is warm and dry. Psychiatric: Normal mood and affect.  Behavior is normal. Judgment and thought content normal.      EKG (05/26/2021):  NSR 79 bpm with PACs, LBBB (QRSd 132 ms). QTc 497 ms. Assessment/Plan:     Imaging/Studies:   Echo (05/25/2021): LVEF 55-60%. Severely dilated RA, mod dilated LA. Mild to mod MR. Mild to mod TR. Mild PH. Holter (10/23/2017): Predominant rhythm AF  bpm.  Rare RVR.  BBB. Frequent PVCs, rare ventricular couplets, triplets, bigeminy, & trigeminy. Rare NSVT. AF/AFL with RVR: No VT. Now s/p DCCV 05/26/2021, had brief recurrent AF overnight, but NSR this morning. She prefers rhythm maintenance over rate control. With patient's known ZOË, mild to mod MR, & chronic pulmonary problems, unlikely that NSR would be successful long term post DCCV, but she said it had previously lasted 3 years and she wanted it again. Restart Tikosyn 250 mcg po bid as PTA. QTc 497 ms, but with LBBB (QRSd 132 ms). She may eventually need dose reduced; she should follow closely with Dr. Silvia Lopez. With Tikosyn, maintain K>4 & Mg>2. Anticoagulation: Continue Eliquis for embolic CVA prophylaxis. On low dose, is 3 months away from being 81 & weight <60 kg. She confirms no missed doses in the past few weeks. ALCIDES Silva[address] [facility] [place]  05/27/21      Addendum from EP attending:   I have seen, examined patient, and discussed with nurse practitioner, registered nurse, reviewed, updated note and agree with the assessment and plan    I have talked to her this am. She is concerned that she has PAF and no tikosyn ordered  She wants to go home  Vital signs with high BP  Exam shows regular rhythm    Frail   Barrel chest  Assessment and Plan:   With LBBB and QTc under 500 ms she said she has been chronically on tikosyn at home 250 mcg bid  There is risk of TdP I told her  Her K and Mag and creatinine normal range   She said \"you guys do not get my message\"  she wants to go home and back on Tikosyn and she has this at home    Thank you for involving me in this patient's care and please call with further concerns or questions. Flor Romero M.D.   Electrophysiology/Cardiology  [facility] [place]  [facility] [address], [facility] [address] [place], [place] [address]reji Davalos [address] [place] [address]  [phone]

## 2021-05-27 NOTE — PROGRESS NOTES
Bedside shift change report given to Matt Tiwari (oncoming nurse) by Marco Antonio Iyer (offgoing nurse). Report included the following information SBAR, Kardex, Intake/Output, MAR and Cardiac Rhythm Afib.

## 2021-05-27 NOTE — PROGRESS NOTES
Bedside shift change report given to Bree Lucero RN (oncoming nurse) by CATALINA Chow (offgoing nurse). Report included the following information SBAR, Kardex, Intake/Output, MAR, Accordion and Cardiac Rhythm NSR.

## 2021-05-28 VITALS
HEIGHT: 65 IN | TEMPERATURE: 98.5 F | OXYGEN SATURATION: 100 % | DIASTOLIC BLOOD PRESSURE: 44 MMHG | RESPIRATION RATE: 17 BRPM | WEIGHT: 120.5 LBS | SYSTOLIC BLOOD PRESSURE: 92 MMHG | HEART RATE: 74 BPM | BODY MASS INDEX: 20.08 KG/M2

## 2021-05-28 LAB
ALBUMIN SERPL-MCNC: 3.9 G/DL (ref 3.5–5)
ALBUMIN/GLOB SERPL: 1.3 {RATIO} (ref 1.1–2.2)
ALP SERPL-CCNC: 95 U/L (ref 45–117)
ALT SERPL-CCNC: 26 U/L (ref 12–78)
ANION GAP SERPL CALC-SCNC: 8 MMOL/L (ref 5–15)
AST SERPL-CCNC: 24 U/L (ref 15–37)
BASOPHILS # BLD: 0 K/UL (ref 0–0.1)
BASOPHILS NFR BLD: 1 % (ref 0–1)
BILIRUB SERPL-MCNC: 0.6 MG/DL (ref 0.2–1)
BUN SERPL-MCNC: 21 MG/DL (ref 6–20)
BUN/CREAT SERPL: 33 (ref 12–20)
CALCIUM SERPL-MCNC: 9 MG/DL (ref 8.5–10.1)
CHLORIDE SERPL-SCNC: 102 MMOL/L (ref 97–108)
CO2 SERPL-SCNC: 25 MMOL/L (ref 21–32)
CREAT SERPL-MCNC: 0.64 MG/DL (ref 0.55–1.02)
DIFFERENTIAL METHOD BLD: NORMAL
EOSINOPHIL # BLD: 0.1 K/UL (ref 0–0.4)
EOSINOPHIL NFR BLD: 2 % (ref 0–7)
ERYTHROCYTE [DISTWIDTH] IN BLOOD BY AUTOMATED COUNT: 13.2 % (ref 11.5–14.5)
GLOBULIN SER CALC-MCNC: 2.9 G/DL (ref 2–4)
GLUCOSE SERPL-MCNC: 81 MG/DL (ref 65–100)
HCT VFR BLD AUTO: 37.9 % (ref 35–47)
HGB BLD-MCNC: 12.2 G/DL (ref 11.5–16)
IMM GRANULOCYTES # BLD AUTO: 0 K/UL (ref 0–0.04)
IMM GRANULOCYTES NFR BLD AUTO: 0 % (ref 0–0.5)
LYMPHOCYTES # BLD: 2.1 K/UL (ref 0.8–3.5)
LYMPHOCYTES NFR BLD: 35 % (ref 12–49)
MCH RBC QN AUTO: 29.8 PG (ref 26–34)
MCHC RBC AUTO-ENTMCNC: 32.2 G/DL (ref 30–36.5)
MCV RBC AUTO: 92.4 FL (ref 80–99)
MONOCYTES # BLD: 0.7 K/UL (ref 0–1)
MONOCYTES NFR BLD: 11 % (ref 5–13)
NEUTS SEG # BLD: 3.1 K/UL (ref 1.8–8)
NEUTS SEG NFR BLD: 51 % (ref 32–75)
NRBC # BLD: 0 K/UL (ref 0–0.01)
NRBC BLD-RTO: 0 PER 100 WBC
PLATELET # BLD AUTO: 162 K/UL (ref 150–400)
PMV BLD AUTO: 10.6 FL (ref 8.9–12.9)
POTASSIUM SERPL-SCNC: 4 MMOL/L (ref 3.5–5.1)
PROT SERPL-MCNC: 6.8 G/DL (ref 6.4–8.2)
RBC # BLD AUTO: 4.1 M/UL (ref 3.8–5.2)
SODIUM SERPL-SCNC: 135 MMOL/L (ref 136–145)
WBC # BLD AUTO: 6 K/UL (ref 3.6–11)

## 2021-05-28 PROCEDURE — 85025 COMPLETE CBC W/AUTO DIFF WBC: CPT

## 2021-05-28 PROCEDURE — 74011250637 HC RX REV CODE- 250/637: Performed by: NURSE PRACTITIONER

## 2021-05-28 PROCEDURE — 74011250637 HC RX REV CODE- 250/637: Performed by: HOSPITALIST

## 2021-05-28 PROCEDURE — 80053 COMPREHEN METABOLIC PANEL: CPT

## 2021-05-28 PROCEDURE — 99232 SBSQ HOSP IP/OBS MODERATE 35: CPT | Performed by: SPECIALIST

## 2021-05-28 PROCEDURE — 74011250637 HC RX REV CODE- 250/637: Performed by: FAMILY MEDICINE

## 2021-05-28 PROCEDURE — 36415 COLL VENOUS BLD VENIPUNCTURE: CPT

## 2021-05-28 RX ORDER — CETIRIZINE HCL 10 MG
10 TABLET ORAL
Qty: 30 TABLET | Refills: 0 | Status: SHIPPED | OUTPATIENT
Start: 2021-05-28 | End: 2021-07-26 | Stop reason: SDUPTHER

## 2021-05-28 RX ORDER — BENZONATATE 100 MG/1
100 CAPSULE ORAL
Qty: 20 CAPSULE | Refills: 0 | Status: SHIPPED | OUTPATIENT
Start: 2021-05-28 | End: 2021-06-04

## 2021-05-28 RX ADMIN — DOFETILIDE 250 MCG: 0.12 CAPSULE ORAL at 09:11

## 2021-05-28 RX ADMIN — APIXABAN 2.5 MG: 2.5 TABLET, FILM COATED ORAL at 09:02

## 2021-05-28 RX ADMIN — MAGNESIUM OXIDE TAB 400 MG (241.3 MG ELEMENTAL MG) 400 MG: 400 (241.3 MG) TAB at 09:02

## 2021-05-28 RX ADMIN — AMLODIPINE BESYLATE 5 MG: 5 TABLET ORAL at 09:02

## 2021-05-28 RX ADMIN — CARVEDILOL 3.12 MG: 3.12 TABLET, FILM COATED ORAL at 09:02

## 2021-05-28 RX ADMIN — POTASSIUM CHLORIDE 20 MEQ: 750 TABLET, EXTENDED RELEASE ORAL at 09:02

## 2021-05-28 NOTE — PROGRESS NOTES
Bedside and Verbal shift change report given to Fatou RN (oncoming nurse) by Karma Zimmerman RN (offgoing nurse). Report included the following information Kardex, Procedure Summary, Intake/Output, MAR, Recent Results and Cardiac Rhythm Normal sinus.

## 2021-05-28 NOTE — DISCHARGE INSTRUCTIONS
DISCHARGE SUMMARY from Nurse    PATIENT INSTRUCTIONS:      These are general instructions for a healthy lifestyle:    No smoking/ No tobacco products/ Avoid exposure to second hand smoke  Surgeon General's Warning:  Quitting smoking now greatly reduces serious risk to your health. Obesity, smoking, and sedentary lifestyle greatly increases your risk for illness    A healthy diet, regular physical exercise & weight monitoring are important for maintaining a healthy lifestyle    You may be retaining fluid if you have a history of heart failure or if you experience any of the following symptoms:  Weight gain of 3 pounds or more overnight or 5 pounds in a week, increased swelling in our hands or feet or shortness of breath while lying flat in bed. Please call your doctor as soon as you notice any of these symptoms; do not wait until your next office visit. The discharge information has been reviewed with the patient. The patient verbalized understanding. Discharge medications reviewed with the patient and appropriate educational materials and side effects teaching were provided. ___________________________________________________________________________________________________________________________________Please follow-up with your primary care physician on May 31, 2021 to have a CBC and a BMP and monitor electrolytes    Please call EMS and  return to ED for any acute medical condition including but not limited to Headache, Blurry vision, trouble swallowing, trouble with speech, chest pain, dizziness, lightheaded, fevers, chills, cough, Shortness of breath, cough, fevers, chills abdominal pain, nausea,vomiting, diarrhea, weakness in arms or legs, slurred speech,  vomiting, palpations, rashes, abnormal labs, falls, injuries or any medical concerns.

## 2021-05-28 NOTE — PROGRESS NOTES
Pt seen by Dr. Edith Tapia in NSR. She is ok from cardiology perspective to be discharged this a.m. She will follow up with Dr. Yessica Haider in 1 month. Full progress note to follow.

## 2021-05-28 NOTE — ROUTINE PROCESS
1030:  SBP low after Norvasc/Coreg. Will continue to monitor. 1245:  Pt voiced she usually take her Norvasc at night. She added she walked to the bathroom and was feeling fine. Educate about monitoring BP and resuming home medication regiment. F/u with PCP on Tuesday. 1305:  Pt wants to eat lunch first before finish dressing. 1348:  Discharge reviewed with patient. Remind to stop taking two of the BP medications. Had issue with printer and IV bleeding but pt was wheelchair done for discharge about 1400. Pt has her discharge paperwork. Pt signed the hard copy of discharge as room does not have computer. Ride is waiting for patient downstairs.

## 2021-05-28 NOTE — PROGRESS NOTES
Problem: Discharge Planning  Goal: *Discharge to safe environment  Outcome: Resolved/Met     Problem: Falls - Risk of  Goal: *Absence of Falls  Description: Document Taisha Peralta Fall Risk and appropriate interventions in the flowsheet.   Outcome: Resolved/Met     Problem: Patient Education: Go to Patient Education Activity  Goal: Patient/Family Education  Outcome: Resolved/Met

## 2021-05-28 NOTE — PROGRESS NOTES
Henrry Humphreys     1940      [place]    Date of Visit-5/28/2021   PCP is Lizz Street MD     [facility] [place]  [facility]  HPI:  Henrry Humphreys is a 79 y.o. female   Subjective: Pt underwent successful DCCV on 5/26/21, had brief episode of PAF that note but none since. She again denies chest pain and SOB this a.m. She remains in NSR. She is eager to go home. She desires to follow up with Dr. Paxton Keenan.     Assessment/Plan:     1. Persistent atrial fibrillation, with RVR       -S/p successful DCCV 5/26/21        -Contnue Coreg 3.125 mg BID       -Continue Tikosyn (QtC goal is less than 550 ms per Dr. Umair Quintero with LBBB)       -Continue Eliquis 2.5 mg BID (on reduced dosing, Age 79- near age [de-identified] in 2 months, wt 53.2 Kg)           05/25/21    ECHO ADULT COMPLETE 05/25/2021 5/25/2021    Interpretation Summary  · LV: Estimated LVEF is 55 - 60%. Normal cavity size, wall thickness, systolic function (ejection fraction normal) and diastolic function. Wall motion: normal.  · MV: Mild to moderate mitral valve regurgitation is present. · PA: Mild pulmonary hypertension. Pulmonary arterial systolic pressure is 40 mmHg. Signed by: Jinny Covarrubias MD on 5/25/2021  4:55 PM        2. HTN -- bp at goal for age       -Continue Amlodipine and Coreg. 3. PVCs: keep K above 4 mag above 2  4. LBBB  5. Dyslipidemia,        - on Atorvastatin. 6. Acute HFpEF related to A. fib with RVR        -Appears compensated currently        -No diuresis secondary to hypotension    Other comorbids  7. Bronchiectasis, ? MAC infection        -follows with Antonio, sees Dr. Patricio Moeller at [facility].  8. History of Breast CA,Pulmonary nodules, followed at [facility]. History of cancer in the colon, adenocarcinoma, breast cancer. 9. Body mass index is 20.05 kg/m². Try to keep her weight up      Remains in NSR, no PAF noted on tele review. [id number] [place] [address] for discharge from cardiology perspective.  Follow up with Dr. David Jamison in 1 month. Pt was seen by Dr. David Jamison today. Future Appointments   Date Time Provider Corky Mann   7/2/2021  2:20 PM MD BRIGID Orourke BS AMB   10/21/2021  7:30 AM Edith Rea MD [facility] BS AMB                Impression:   1. Atrial fibrillation with RVR ([address]    2. Congestive heart failure, unspecified HF chronicity, unspecified heart failure type (HCC)    3. Elevated brain natriuretic peptide (BNP) level    4. Atrial fibrillation, unspecified type ([address]    5. Seasonal allergic rhinitis due to pollen    6. Persistent atrial fibrillation (HCC)       Cardiac History:   MAGALI 9-18-13=EF 55-60, mild MR  CV 9-18-13-sucessful with 150 J one shock  ECHO 8-26-13=mild dilated RV, 2+ ZOË, 2+ MR,TR, mild pulm HTN  ECHO 3-= EF 65% ZOË,mild MR,TR  CATH 4-= normal cors, ef 60%     HPI:  She was on a lower dose of Eliquis because she'd had some GYN bleeding previously. She saw Dr. 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 Dr. Jac Palacios recommended that she return to sinus rhythm and referred her to Dr. Eliud Villasenor at [facility]. She has seen her recently. She has had 2 cardioversions but back in aflutter again. Once with po tikosyn and once with cardioversion. Dr. London Robertson said she was not a candidate for ablation. She could not get in to see Dr. London Robertson to address this acute episode of atrial fibrillation so came into the hospital for control. Seen in [place] point day before presentation. Back in afib, woke her up, thinks it started last week. Hx of metoprolol dofetalide(long QT on that) had to lower the dose and now on it with the coreg. She came in with the shortness of breath and cough starting up, kept thinking the afib was going to go away but it never did. Sunday getting faint and dizzy. Thinks it is all afib.      So we discussed options for cardioversion given the timing of things yesterday she elected to take an extra Tikosyn dose as that is worked in the past.  It did not work to convert her. Yesterday evening, a rapid response was called due to acute decompensation. All of a sudden her heart rate went faster and the telemetry monitor was reading ventricular tachycardia. She became acutely hypotensive and near syncopal and was laid in the bed where she was given a bolus of normal saline. Over time her blood pressure and her mental status did recover without any additional interventions. Nursing was able to run several EKGs for me which I reviewed and revealed that she was not in V. tach but in fact having A. fib with RVR. Given the difficulties in managing her A. fib with RVR she would normally be best served by ablation but Dr. Pamela Lynn has already told her she is not a candidate for that I do not have those details available to me at the moment. She did not respond to additional Tikosyn therapy and other antiarrhythmics are relatively contraindicated. So Dr. Roberts [place] to saw her. She undewrent successful DCCV on 5/26/21. ROS-except as noted above. . A complete cardiac and respiratory are reviewed and negative except as above ; Resp-denies wheezing  or productive cough,. 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 Tamoxifen     Essential hypertension     Lung nodule 10/1/2009    Mixed hyperlipidemia 3/18/2009    Paroxysmal A-fib (HCC)     Paroxysmal atrial fibrillation ([address] 11/14/2019    Dr Tami Gutierrez Rectal polyp 3/18/2009    -adenocarcinoma Stage 1     Additional PMH : Hx also of seeing Irina Yee Dr. Ernie, having a previous history of breast cancer and adenocarcinoma. She was seen by GYN 05/24/18 with a histography, polypectomy and D&C done at that time as an outpatient. Pulmonary nodules CT follow-up   MRI for Thoracic surgery at [address] every 6 months  Saw Dr Ramakrishna Figueroa EP [facility] Dec 2018 with ACE added for high BP    Social Hx= reports that she has never smoked. She has never used smokeless tobacco. She reports current alcohol use of about 1.7 standard drinks of alcohol per week. She reports that she does not use drugs. Exam and Labs:  BP (!) 119/54 (BP 1 Location: Right upper arm, BP Patient Position: Sitting)   Pulse 84   Temp 98.4 °F (36.9 °C)   Resp 18   Ht 5' 5\" (1.651 m)   Wt 120 lb 8 oz (54.7 kg)   LMP  (LMP Unknown)   SpO2 99%   BMI 20.05 kg/m² Constitutional:  NAD, comfortable    General: alert, cooperative NAD, thin  Neck: supple  Lungs: clear to auscultation bilaterally  Heart: Regular rate and rhythm, I-II/VI systolic murmur  Abdomen: soft, nondistended. .    Extremities:  Edema is none.   Neuro: Alert and oriented, MEZA     Wt Readings from Last 3 Encounters:   05/28/21 120 lb 8 oz (54.7 kg)   04/23/21 124 lb 12.8 oz (56.6 kg)   01/07/21 125 lb 6.4 oz (56.9 kg)      BP Readings from Last 3 Encounters:   05/28/21 (!) 119/54   04/23/21 128/82   01/07/21 (!) 142/76      Current Facility-Administered Medications   Medication Dose Route Frequency    dofetilide (TIKOSYN) capsule 250 mcg  250 mcg Oral Q12H    0.9% sodium chloride infusion  75 mL/hr IntraVENous CONTINUOUS    amLODIPine (NORVASC) tablet 5 mg  5 mg Oral DAILY    benzonatate (TESSALON) capsule 100 mg  100 mg Oral TID PRN    albuterol-ipratropium (DUO-NEB) 2.5 MG-0.5 MG/3 ML  3 mL Nebulization Q6H PRN    magnesium oxide (MAG-OX) tablet 400 mg  400 mg Oral BID    potassium chloride SR (KLOR-CON 10) tablet 20 mEq  20 mEq Oral DAILY    apixaban (ELIQUIS) tablet 2.5 mg  2.5 mg Oral BID    atorvastatin (LIPITOR) tablet 10 mg  10 mg Oral QHS    carvediloL (COREG) tablet 3.125 mg  3.125 mg Oral BID WITH MEALS    sodium chloride (NS) flush 5-40 mL  5-40 mL IntraVENous Q8H    sodium chloride (NS) flush 5-40 mL  5-40 mL IntraVENous PRN    acetaminophen (TYLENOL) tablet 650 mg  650 mg Oral Q4H PRN      Impression see above. Prior to Admission Medications   Prescriptions Last Dose Informant Patient Reported? Taking?   acetaminophen (TYLENOL EXTRA STRENGTH) 500 mg tablet   Yes No   Sig: Take  by mouth as needed for Pain. amLODIPine (NORVASC) 5 mg tablet   No No   Sig: Take 1.5 Tabs by mouth daily. Patient taking differently: Take 5 mg by mouth daily. apixaban (Eliquis) 2.5 mg tablet   No No   Sig: Take 1 Tab by mouth two (2) times a day. atorvastatin (LIPITOR) 10 mg tablet   No No   Sig: TAKE 1/2 TABLET BY MOUTH EVERY DAY   carvediloL (COREG) 3.125 mg tablet   Yes No   Sig: TAKE 1 TABLET BY MOUTH TWICE DAILY. STOP METOPROLOL   cetirizine (ZYRTEC) 10 mg tablet   No No   Sig: Take 1 Tab by mouth daily as needed for Allergies. diphenhydrAMINE (Benadryl Allergy) 25 mg tablet   Yes No   Sig: Take 25 mg by mouth every six (6) hours as needed. dofetilide (TIKOSYN) 250 mcg capsule   Yes No   Sig: Take 250 mcg by mouth two (2) times a day. fluticasone propionate (FLONASE) 50 mcg/actuation nasal spray   No No   Si Sprays by Both Nostrils route daily.    hydrocortisone (HYTONE) 2.5 % topical cream   Yes No   Sig: APPLY TO AFFECTED AREA TWICE A DAY   valsartan (DIOVAN) 320 mg tablet   No No   Sig: TAKE 1 TABLET BY MOUTH DAILY      Facility-Administered Medications: None

## 2021-05-28 NOTE — PROGRESS NOTES
Transitions of Care: 14% risk of re-admission      -Patient will discharge home today, will need transportation through [facility]    -PCP follow-up, requested PCP appointment requested for Case Management Specialist       The CM noted discharge orders, CM met with the patient at bedside, denied any concerns for discharge home today- has list of private duty resources if needed in the future. The patient endorses she is ambulatory, she does want [facility] transport and willing to pay OOP- will need some help getting in-and-out of vehicle. Medicare pt has received and reviewed 2nd IM letter informing them of their right to appeal the discharge. Copy has been placed on pt bedside chart. The CM called [facility], spoke with Nelsonmartha Barry- provided address (confirmed with patient), and informed [facility] patient will need assist getting in and out of vehicle, they can accommodate. Pick-up between 1:30-2:00 p.m. today, cost approximately $30.00. The CM met with the patient at bedside- agreeable with above information, she has phone number for [facility] to pay via phone prior to pick-up.      CM has asked RN to ambulate patient to ensure no concerns, CM also requested PCP appointment via Case Management Specialist.     Juan Layne LCSW

## 2021-05-28 NOTE — PROGRESS NOTES
Problem: Discharge Planning  Goal: *Discharge to safe environment  Outcome: Progressing Towards Goal     Problem: Falls - Risk of  Goal: *Absence of Falls  Description: Document Darren Abilio Fall Risk and appropriate interventions in the flowsheet.   Outcome: Progressing Towards Goal  Note: Fall Risk Interventions:            Medication Interventions: Teach patient to arise slowly                   Problem: Patient Education: Go to Patient Education Activity  Goal: Patient/Family Education  Outcome: Progressing Towards Goal

## 2021-05-28 NOTE — PROGRESS NOTES
Hospital follow-up PCP transitional care appointment has been scheduled with Dr. Heather Valdes for Tuesday, 6/1/21 at 1:45 p.m. Pending patient discharge.   Jason Flores, Care Management Specialist.

## 2021-05-28 NOTE — DISCHARGE SUMMARY
[address] [facility] Adult  Hospitalist Group                                                                                          Hospitalist Progress Note  Stephy Siddiqui MD  Answering service: [phone] OR [phone] from in house phone        Date of Service:  2021  NAME:  Dano Salinas Day  :  1940  MRN:  [id number]    This documentation was facilitated by a Voice Recognition software and may contain inadvertent typographical errors. Admission Summary:   Admitted for AFIB w/RVR when she presented with palpitation and SOB. PMH significant for afib,bronchientasis,breast cancer. She was recently diagnosed with pneumonia on  and treated with one week of doxycycline   Patient has followed at [facility] for breast cancer, bronchiectasis per patient  She sees pulmonary at [facility] for MAC per records,but herself not sure. She said they recommended 2 years of antibiotics she is reluctant to accept. She also sees EP cards at [facility]. Interval history / Subjective:        Patient resting in bed, denies any complaints or problems, eager to go home, reports that she will not \"stay in this hospital another day no matter what\"  Assessment & Plan:   Persistent afib with RVR. Previously failed chemical and electrical cardioversion   Currently in sinus rhythm  -Status post cardioversion 2021  -Rate controlled, denies any symptoms  -Cardizem gtt.  discontinued  -On coreg and Tikosyn at home.  -Would like to resume Tikosyn in spite of the associated risk  -If to want to continue Tikosyn, should keep potassium greater than 4 and magnesium greater than 2  -On apixaban for stroke prevention  -TTE normal EF,diastolic function.  -Cardiology cleared patient for discharge      Metabolic acidosis  Resolved, no obvious signs of infection, close monitoring      HTN, soft blood pressures, hold Norvasc, discharge patient on Coreg and Tikosyn, follow-up with primary care physician for blood pressure management  Dyslipidemia: continue atorvastatin    Bronchiectasis,pul nodule? MAC infection  -Follows at [facility]. She sees Dr Camryn Vail at [facility]  -[facility] and cough meds as needed     H/o Breast cancer    I spoke to the patient in detail, recommended home PT and/or home health, patient refused and reports she does not want any of those as she is \"very well to function on her own\". I had the patient walk with me in the room and she has no ambulatory dysfunction, no ataxia or gait abnormality was noted. Patient reports that she is all set to take care of herself and does not need to help at home. Diet: Cardiac  Ambulation: Ad maya., fall precautions discussed with patient's  Code status: full  Follow-up with PCP on 5/31/2021 for blood draw    Hospital Problems  Date Reviewed: 5/26/2021        Codes Class Noted POA    Atrial fibrillation with RVR [facility]) ICD-10-CM: I48.91  ICD-9-CM: 427.31  5/25/2021 Unknown                Review of Systems:   A comprehensive review of systems was negative except for that written in the HPI. Vital Signs:    Last 24hrs VS reviewed since prior progress note. Most recent are:  Visit Vitals  BP (!) 119/54 (BP 1 Location: Right upper arm, BP Patient Position: Sitting)   Pulse 84   Temp 98.4 °F (36.9 °C)   Resp 18   Ht 5' 5\" (1.651 m)   Wt 54.7 kg (120 lb 8 oz)   SpO2 99%   BMI 20.05 kg/m²         Intake/Output Summary (Last 24 hours) at 5/28/2021 0931  Last data filed at 5/27/2021 1900  Gross per 24 hour   Intake 200 ml   Output    Net 200 ml        Physical Examination:     I had a face to face encounter with this patient and independently examined them on5/28/2021 as outlined below:        Constitutional:  No acute distress, cooperative, pleasant    HEENT:  Atraumatic. Oral mucosa moist,. Non icteric sclera. No pallor. Resp:  CTA bilaterally. No wheezing/rhonchi/rales. No accessory muscle use   Chest Wall: No deformity   CV:  S1-S2 were heard    GI:  Soft, non distended, non tender.  normoactive bowel sounds, no hepatosplenomegaly : No CVA or suprapubic tenderness    Musculoskeletal:  No edema. Neurologic:  Mental status:AAOx3,   Cranial nerves II-XII : WNL  Motor exam:Moves all extremities symmetrically              Data Review:    Review and/or order of clinical lab test  Review and/or order of tests in the radiology section of CPT  Review and/or order of tests in the medicine section of [facility]      Labs:     Recent Labs     05/28/21  0413 05/27/21  1901   WBC 6.0 8.0   HGB 12.2 12.9   HCT 37.9 39.8    164     Recent Labs     05/28/21  0413 05/27/21  0937 05/27/21  0319 05/26/21  0446 05/25/21  1900   *  --  132* 137 134*   K 4.0  --  4.5 3.4* 3.8     --  104 105 103   CO2 25  --  20* 21 21   BUN 21*  --  18 12 14   CREA 0.64  --  0.73 0.57 0.76   GLU 81  --  85 70 91   CA 9.0  --  9.2 8.6 8.8   MG  --  2.2 2.4  --  1.9     Recent Labs     05/28/21  0413   ALT 26   AP 95   TBILI 0.6   TP 6.8   ALB 3.9   GLOB 2.9     No results for input(s): INR, PTP, APTT, INREXT, INREXT in the last 72 hours. No results for input(s): FE, TIBC, PSAT, FERR in the last 72 hours. No results found for: FOL, RBCF   No results for input(s): PH, PCO2, PO2 in the last 72 hours.   Recent Labs     05/26/21 0446 05/25/21  1730   CPK  --  60  62   TROIQ <0.05 <0.05     Lab Results   Component Value Date/Time    Cholesterol, total 139 04/30/2021 08:24 AM    HDL Cholesterol 63 04/30/2021 08:24 AM    LDL, calculated 62 04/30/2021 08:24 AM    LDL, calculated 74.6 10/26/2020 04:01 PM    Triglyceride 68 04/30/2021 08:24 AM    CHOL/HDL Ratio 2.5 10/26/2020 04:01 PM     Lab Results   Component Value Date/Time    Glucose (POC) 85 05/27/2021 04:39 PM    Glucose (POC) 96 05/27/2021 12:28 PM     Lab Results   Component Value Date/Time    Color YELLOW/STRAW 05/27/2021 03:59 PM    Appearance CLEAR 05/27/2021 03:59 PM    Specific gravity 1.008 05/27/2021 03:59 PM    Specific gravity 1.015 08/24/2013 04:10 AM    pH (UA) 5.5 05/27/2021 03:59 PM    Protein Negative 05/27/2021 03:59 PM    Glucose Negative 05/27/2021 03:59 PM    Ketone Negative 05/27/2021 03:59 PM    Bilirubin Negative 05/27/2021 03:59 PM    Urobilinogen 0.2 05/27/2021 03:59 PM    Nitrites Negative 05/27/2021 03:59 PM    Leukocyte Esterase TRACE (A) 05/27/2021 03:59 PM    Epithelial cells FEW 05/27/2021 03:59 PM    Bacteria 1+ (A) 05/27/2021 03:59 PM    WBC 0-4 05/27/2021 03:59 PM    RBC 0-5 05/27/2021 03:59 PM         Medications Reviewed:     Current Facility-Administered Medications   Medication Dose Route Frequency    dofetilide (TIKOSYN) capsule 250 mcg  250 mcg Oral Q12H    0.9% sodium chloride infusion  75 mL/hr IntraVENous CONTINUOUS    amLODIPine (NORVASC) tablet 5 mg  5 mg Oral DAILY    benzonatate (TESSALON) capsule 100 mg  100 mg Oral TID PRN    albuterol-ipratropium (DUO-NEB) 2.5 MG-0.5 MG/3 ML  3 mL Nebulization Q6H PRN    magnesium oxide (MAG-OX) tablet 400 mg  400 mg Oral BID    potassium chloride SR (KLOR-CON 10) tablet 20 mEq  20 mEq Oral DAILY    apixaban (ELIQUIS) tablet 2.5 mg  2.5 mg Oral BID    atorvastatin (LIPITOR) tablet 10 mg  10 mg Oral QHS    carvediloL (COREG) tablet 3.125 mg  3.125 mg Oral BID WITH MEALS    sodium chloride (NS) flush 5-40 mL  5-40 mL IntraVENous Q8H    sodium chloride (NS) flush 5-40 mL  5-40 mL IntraVENous PRN    acetaminophen (TYLENOL) tablet 650 mg  650 mg Oral Q4H PRN     ______________________________________________________________________  EXPECTED LENGTH OF STAY: 3d 12h  ACTUAL LENGTH OF STAY:          3                 Sandra Zuñiga MD

## 2021-06-01 ENCOUNTER — OFFICE VISIT (OUTPATIENT)
Dept: INTERNAL MEDICINE CLINIC | Age: 81
End: 2021-06-01
Payer: MEDICARE

## 2021-06-01 ENCOUNTER — PATIENT OUTREACH (OUTPATIENT)
Dept: CASE MANAGEMENT | Age: 81
End: 2021-06-01

## 2021-06-01 VITALS
HEART RATE: 71 BPM | SYSTOLIC BLOOD PRESSURE: 142 MMHG | DIASTOLIC BLOOD PRESSURE: 82 MMHG | WEIGHT: 118.2 LBS | TEMPERATURE: 97.6 F | RESPIRATION RATE: 16 BRPM | BODY MASS INDEX: 19.69 KG/M2 | HEIGHT: 65 IN | OXYGEN SATURATION: 96 %

## 2021-06-01 DIAGNOSIS — A31.9 MYCOBACTERIUM INFECTIONS, ATYPICAL: ICD-10-CM

## 2021-06-01 DIAGNOSIS — E78.5 DYSLIPIDEMIA: ICD-10-CM

## 2021-06-01 DIAGNOSIS — J47.9 BRONCHIECTASIS WITHOUT COMPLICATION (HCC): ICD-10-CM

## 2021-06-01 DIAGNOSIS — I48.91 ATRIAL FIBRILLATION WITH RVR (HCC): ICD-10-CM

## 2021-06-01 DIAGNOSIS — I82.409 DEEP VEIN THROMBOSIS (DVT) OF LOWER EXTREMITY, UNSPECIFIED CHRONICITY, UNSPECIFIED LATERALITY, UNSPECIFIED VEIN (HCC): ICD-10-CM

## 2021-06-01 DIAGNOSIS — I48.0 PAROXYSMAL ATRIAL FIBRILLATION (HCC): Primary | ICD-10-CM

## 2021-06-01 LAB
ALBUMIN SERPL-MCNC: 4.4 G/DL (ref 3.5–5)
ALBUMIN/GLOB SERPL: 1.5 {RATIO} (ref 1.1–2.2)
ALP SERPL-CCNC: 109 U/L (ref 45–117)
ALT SERPL-CCNC: 31 U/L (ref 12–78)
ANION GAP SERPL CALC-SCNC: 6 MMOL/L (ref 5–15)
AST SERPL-CCNC: 31 U/L (ref 15–37)
BILIRUB SERPL-MCNC: 0.5 MG/DL (ref 0.2–1)
BUN SERPL-MCNC: 21 MG/DL (ref 6–20)
BUN/CREAT SERPL: 27 (ref 12–20)
CALCIUM SERPL-MCNC: 9.1 MG/DL (ref 8.5–10.1)
CHLORIDE SERPL-SCNC: 104 MMOL/L (ref 97–108)
CO2 SERPL-SCNC: 27 MMOL/L (ref 21–32)
CREAT SERPL-MCNC: 0.79 MG/DL (ref 0.55–1.02)
GLOBULIN SER CALC-MCNC: 2.9 G/DL (ref 2–4)
GLUCOSE SERPL-MCNC: 79 MG/DL (ref 65–100)
MAGNESIUM SERPL-MCNC: 2.2 MG/DL (ref 1.6–2.4)
POTASSIUM SERPL-SCNC: 4.6 MMOL/L (ref 3.5–5.1)
PROT SERPL-MCNC: 7.3 G/DL (ref 6.4–8.2)
SODIUM SERPL-SCNC: 137 MMOL/L (ref 136–145)

## 2021-06-01 PROCEDURE — 99496 TRANSJ CARE MGMT HIGH F2F 7D: CPT | Performed by: INTERNAL MEDICINE

## 2021-06-01 PROCEDURE — G8427 DOCREV CUR MEDS BY ELIG CLIN: HCPCS | Performed by: INTERNAL MEDICINE

## 2021-06-01 RX ORDER — DOXYCYCLINE 100 MG/1
CAPSULE ORAL
COMMUNITY
Start: 2021-05-09 | End: 2021-06-01 | Stop reason: ALTCHOICE

## 2021-06-01 RX ORDER — ATORVASTATIN CALCIUM 10 MG/1
TABLET, FILM COATED ORAL
Qty: 45 TABLET | Refills: 1 | Status: SHIPPED | OUTPATIENT
Start: 2021-06-01 | End: 2021-10-21 | Stop reason: SDUPTHER

## 2021-06-01 NOTE — PROGRESS NOTES
HISTORY OF PRESENT ILLNESS  Bessie Humphreys is a 79 y.o. female. HPI  Seen today with her daughter who is visiting from [place]. This is a transition of care visit. Radha Fowler was hospitalized at [address] [place] [address], May 26 through May 28, for AFib with rapid ventricular response. She underwent a cardioversion on May 26. She is maintained on Coreg, Eliquis, and Tikosyn and will see Dr. Frederick Lau as well as Dr. Gavino Krause at [facility] in the next month. She, in May, was treated at [facility] for pneumonia with doxycycline. She did have a chest x-ray in the hospital, which showed stable right middle lobe opacities, but unchanged and consistent with known bronchiectasis. She has been seen at [facility] in the past by Dr. Kike Spring for bronchiectasis and MAC. Currently denies any hemoptysis, fevers, chills, or shortness of breath. Denies any recent palpitations. Denies unusual bleeding. Does request refill on Lipitor. Review of Systems   Constitutional: Negative for chills, diaphoresis, fever, malaise/fatigue and weight loss. Respiratory: Negative for cough, shortness of breath and wheezing. Cardiovascular: Negative for chest pain, palpitations, orthopnea, leg swelling and PND. Gastrointestinal: Negative for abdominal pain, constipation, heartburn, nausea and vomiting. Musculoskeletal: Negative for myalgias. Neurological: Negative for dizziness and headaches. Endo/Heme/Allergies: Does not bruise/bleed easily. Physical Exam  Vitals and nursing note reviewed. Constitutional:       Appearance: She is well-developed. HENT:      Head: Normocephalic and atraumatic. Neck:      Thyroid: No thyromegaly. Vascular: No carotid bruit. Cardiovascular:      Rate and Rhythm: Normal rate and regular rhythm. Heart sounds: Normal heart sounds, S1 normal and S2 normal. No murmur heard. Pulmonary:      Effort: Pulmonary effort is normal. No respiratory distress. Breath sounds: Normal breath sounds.  No wheezing or rales. Musculoskeletal:      Cervical back: Normal range of motion and neck supple. Neurological:      Mental Status: She is alert and oriented to person, place, and time. Psychiatric:         Behavior: Behavior normal.         ASSESSMENT and PLAN  Diagnoses and all orders for this visit:    1. Paroxysmal atrial fibrillation (HCC)  -     METABOLIC PANEL, COMPREHENSIVE; Future  -     MAGNESIUM; Future    2. Deep vein thrombosis (DVT) of lower extremity, unspecified chronicity, unspecified laterality, unspecified vein (HCC)-cont on eliquis for a fib    3. Mycobacterium infections, atypical    4. Bronchiectasis without complication ([facility])-discussed chronic lung findings on cxr    5. Atrial fibrillation with RVR ([facility])-now rate controlled and has follow up with cardiology in next week    6.  Dyslipidemia  -     atorvastatin (LIPITOR) 10 mg tablet; 1/2 po qd

## 2021-06-01 NOTE — PROGRESS NOTES
Care Transitions Initial Call    Call within 2 business days of discharge: Yes     Patient: Zara Humphreys Patient : 1940 MRN: [id number]    Last Discharge [address]       Complaint Diagnosis Description Type Department Provider    21 Irregular Heart Beat Atrial fibrillation with RVR ([facility] [address] . .. ED to Hosp-Admission (Discharged) (ADMIT) Beto Mix MD; Melissa Randle. .. Was this an external facility discharge? No     Challenges to be reviewed by the provider   Additional needs identified to be addressed with provider: yes  medications- Norvasc and Valsartan stopped. Does not perform home blood pressure monitoring  Will continue on Tikosyn, should keep K >4 and Mg >2, consider repeat BMP  Needs Lipitor refill         Method of communication with provider : chart routing    Discussed COVID-19 related testing which was not done at this time. Test results were not done. Fully vaccinated    Advance Care Planning:   Does patient have an Advance Directive: yes, reviewed and current. Inpatient Readmission Risk score: Unplanned Readmit Risk Score: 12    Was this a readmission? no   Patient stated reason for the admission: Afib    Patients top risk factors for readmission: medical condition-Afib   Interventions to address risk factors: Scheduled appointment with PCP-, Scheduled appointment with Specialist- and Obtained and reviewed discharge summary and/or continuity of care documents    Care Transition Nurse (CTN) contacted the patient by telephone to perform post hospital discharge assessment. Verified name and  with patient as identifiers. Provided introduction to self, and explanation of the CTN role. Reports doing better. Denies chest pain, sob, palpitations. Cough improved. Tolerating PO.     CTN reviewed discharge instructions, medical action plan and red flags with patient who verbalized understanding. Were discharge instructions available to patient? yes. Reviewed appropriate site of care based on symptoms and resources available to patient including: PCP and Specialist. Patient given an opportunity to ask questions and does not have any further questions or concerns at this time. The patient agrees to contact the PCP office for questions related to their healthcare. Medication reconciliation was performed with patient, who verbalizes understanding of administration of home medications. Referral to Pharm D needed: no     Home Health/Outpatient orders at discharge: refused    [address] ordered at discharge: None      Covid Risk Education    Educated patient about risk for severe COVID-19 due to risk factors according to CDC guidelines. CTN reviewed discharge instructions, medical action plan and red flag symptoms with the patient who verbalized understanding. Discussed COVID vaccination status: yes. Education provided on COVID-19 vaccination as appropriate. Discussed exposure protocols and quarantine with CDC Guidelines. Patient was given an opportunity to verbalize any questions and concerns and agrees to contact CTN or health care provider for questions related to their healthcare. Was patient discharged with a pulse oximeter? no. Discussed follow-up appointments. If no appointment was previously scheduled, appointment scheduling offered: no. Is follow up appointment scheduled within 7 days of discharge? yes. [facility] follow up appointment(s):   Future Appointments   Date Time Provider Corky Mann   6/1/2021  1:45 PM [facility]   7/2/2021  2:20 PM MD BRIGID Donnelly BS AMB   10/21/2021  7:30 AM Shasha Duarte MD [facility] AMB     Non-[facility] follow up appointment(s): NA    Plan for follow-up call in 10-14 days based on severity of symptoms and risk factors. Plan for next call: self management-afib  CTN provided contact information for future needs.     Goals Addressed                 This Visit's Progress     Attend follow up appointments on schedule          06/01/21   Will attend follow up appt with PCP scheduled this afternoon       COMPLETED: Attends follow-up appointments as directed.  Prevent complications post hospitalization.           06/01/21   No falls   Will monitor palpitations/cp/sob

## 2021-06-15 ENCOUNTER — PATIENT OUTREACH (OUTPATIENT)
Dept: CASE MANAGEMENT | Age: 81
End: 2021-06-15

## 2021-06-15 RX ORDER — VALSARTAN 320 MG/1
320 TABLET ORAL DAILY
COMMUNITY
End: 2021-07-26 | Stop reason: SDUPTHER

## 2021-06-15 NOTE — PROGRESS NOTES
Care Transitions Follow Up Call    Care Transition Nurse (CTN) contacted the patient by telephone to follow up after admission on 2021. Verified name and  with patient as identifiers. Addressed changes since last contact: medications- valsartan resumed  Follow up appointment completed? yes. Was follow up appointment scheduled within 7 days of discharge? yes. Advance Care Planning:   Does patient have an Advance Directive: yes, reviewed and current. CTN reviewed discharge instructions, medical action plan and red flags with patient and discussed any barriers to care and/or understanding of plan of care after discharge. Discussed appropriate site of care based on symptoms and resources available to patient including: PCP and Specialist. The patient agrees to contact the PCP office for questions related to their healthcare. Patients top risk factors for readmission: None identified at this time   Interventions to address risk factors: Scheduled appointment with Specialist-waiting to hear from cardiologist for follow up appt    [facility] follow up appointment(s):   Future Appointments   Date Time Provider Corky Mann   2021  2:20 PM Joselito Verma MD [facility]   10/21/2021  7:30 AM Iggy Paez MD [facility] AMB     Non-[facility] follow up appointment(s): NA    CTN provided contact information for future needs. Plan for follow-up call in 10-14 days based on severity of symptoms and risk factors. Plan for next call: heart monitor     Goals Addressed                 This Visit's Progress     Attend follow up appointments on schedule   On track       21   Will attend follow up appt with PCP scheduled this afternoon    06/15/21   Attended follow up appt with PCP   Attended follow up appt with [facility] cards on        Prevent complications post hospitalization.    On track       21   No falls   Will monitor palpitations/cp/sob    06/15/21   Continues with BL sob   Denies palpitations   Will start wearing heart monitor on 6/25 per patient   Continue compliance with medications as prescribed

## 2021-06-29 ENCOUNTER — PATIENT OUTREACH (OUTPATIENT)
Dept: CASE MANAGEMENT | Age: 81
End: 2021-06-29

## 2021-07-14 ENCOUNTER — PATIENT OUTREACH (OUTPATIENT)
Dept: CASE MANAGEMENT | Age: 81
End: 2021-07-14

## 2021-07-14 NOTE — PROGRESS NOTES
Called patient to follow up    Goals      Attend follow up appointments on schedule        06/01/21   Will attend follow up appt with PCP scheduled this afternoon    06/15/21   Attended follow up appt with PCP   Attended [facility] up appt with [facility] cards on 6/9 07/14/21   Will follow up with [facility] cards on 8/12   Will  attend follow up appt with pulm at the end of this month       Prevent complications post hospitalization.         06/01/21   No falls   Will monitor palpitations/cp/sob    06/15/21   Continues with BL sob   Denies palpitations   Will start wearing heart monitor on 6/25 per patient   Continue compliance with medications as prescribed    07/14/21   Heart monitor intact, will continue to wear for another two weeks   Denies palpitations, chest pain

## 2021-07-26 DIAGNOSIS — J30.1 SEASONAL ALLERGIC RHINITIS DUE TO POLLEN: ICD-10-CM

## 2021-07-26 RX ORDER — CETIRIZINE HCL 10 MG
10 TABLET ORAL
Qty: 30 TABLET | Refills: 0 | Status: SHIPPED | OUTPATIENT
Start: 2021-07-26

## 2021-07-26 RX ORDER — VALSARTAN 320 MG/1
320 TABLET ORAL DAILY
Qty: 90 TABLET | Refills: 1 | Status: SHIPPED | OUTPATIENT
Start: 2021-07-26 | End: 2022-01-17

## 2021-08-18 ENCOUNTER — PATIENT OUTREACH (OUTPATIENT)
Dept: CASE MANAGEMENT | Age: 81
End: 2021-08-18

## 2021-08-27 ENCOUNTER — PATIENT OUTREACH (OUTPATIENT)
Dept: CASE MANAGEMENT | Age: 81
End: 2021-08-27

## 2021-08-27 NOTE — PROGRESS NOTES
Patient has graduated from the Bundle program on 8/27/2021. Patient/family has the ability to self-manage at this time Care management goals have been completed. Patient was not referred to the [facility] team for further management. Goals Addressed                 This Visit's Progress     COMPLETED: Attend follow up appointments on schedule          06/01/21   Will attend follow up appt with PCP scheduled this afternoon    06/15/21   Attended follow up appt with PCP   Attended follow up appt with [facility] cards on 6/9 07/14/21   Will follow up with [facility] cards on 8/12   Will  attend follow up appt with pulm at the end of this month       COMPLETED: Prevent complications post hospitalization. 06/01/21   No falls   Will monitor palpitations/cp/sob    06/15/21   Continues with BL sob   Denies palpitations   Will start wearing heart monitor on 6/25 per patient   Continue compliance with medications as prescribed    07/14/21   Heart monitor intact, will continue to wear for another two weeks   Denies palpitations, chest pain            Patient has Care Transition Nurse's contact information for any further questions, concerns, or needs.   Patients upcoming visits:    Future Appointments   Date Time Provider Corky Mann   10/21/2021  7:30 AM Vinod Grier MD [facility] BS AMB

## 2021-10-21 ENCOUNTER — OFFICE VISIT (OUTPATIENT)
Dept: INTERNAL MEDICINE CLINIC | Age: 81
End: 2021-10-21
Payer: MEDICARE

## 2021-10-21 VITALS
TEMPERATURE: 97.5 F | WEIGHT: 121 LBS | HEIGHT: 65 IN | SYSTOLIC BLOOD PRESSURE: 116 MMHG | OXYGEN SATURATION: 98 % | RESPIRATION RATE: 16 BRPM | HEART RATE: 76 BPM | BODY MASS INDEX: 20.16 KG/M2 | DIASTOLIC BLOOD PRESSURE: 74 MMHG

## 2021-10-21 DIAGNOSIS — I48.0 PAROXYSMAL ATRIAL FIBRILLATION (HCC): ICD-10-CM

## 2021-10-21 DIAGNOSIS — I10 PRIMARY HYPERTENSION: ICD-10-CM

## 2021-10-21 DIAGNOSIS — R60.0 EDEMA LEG: ICD-10-CM

## 2021-10-21 DIAGNOSIS — E78.5 DYSLIPIDEMIA: Primary | ICD-10-CM

## 2021-10-21 DIAGNOSIS — F51.02 ADJUSTMENT INSOMNIA: ICD-10-CM

## 2021-10-21 LAB
ALBUMIN SERPL-MCNC: 3.7 G/DL (ref 3.5–5)
ALBUMIN/GLOB SERPL: 1.2 {RATIO} (ref 1.1–2.2)
ALP SERPL-CCNC: 116 U/L (ref 45–117)
ALT SERPL-CCNC: 41 U/L (ref 12–78)
ANION GAP SERPL CALC-SCNC: 6 MMOL/L (ref 5–15)
AST SERPL-CCNC: 30 U/L (ref 15–37)
BILIRUB SERPL-MCNC: 0.5 MG/DL (ref 0.2–1)
BUN SERPL-MCNC: 22 MG/DL (ref 6–20)
BUN/CREAT SERPL: 26 (ref 12–20)
CALCIUM SERPL-MCNC: 9.3 MG/DL (ref 8.5–10.1)
CHLORIDE SERPL-SCNC: 108 MMOL/L (ref 97–108)
CHOLEST SERPL-MCNC: 150 MG/DL
CO2 SERPL-SCNC: 26 MMOL/L (ref 21–32)
CREAT SERPL-MCNC: 0.84 MG/DL (ref 0.55–1.02)
GLOBULIN SER CALC-MCNC: 3.2 G/DL (ref 2–4)
GLUCOSE SERPL-MCNC: 91 MG/DL (ref 65–100)
HDLC SERPL-MCNC: 62 MG/DL
HDLC SERPL: 2.4 {RATIO} (ref 0–5)
LDLC SERPL CALC-MCNC: 75.2 MG/DL (ref 0–100)
POTASSIUM SERPL-SCNC: 4.9 MMOL/L (ref 3.5–5.1)
PROT SERPL-MCNC: 6.9 G/DL (ref 6.4–8.2)
SODIUM SERPL-SCNC: 140 MMOL/L (ref 136–145)
TRIGL SERPL-MCNC: 64 MG/DL (ref ?–150)
TSH SERPL DL<=0.05 MIU/L-ACNC: 1.68 UIU/ML (ref 0.36–3.74)
VLDLC SERPL CALC-MCNC: 12.8 MG/DL

## 2021-10-21 PROCEDURE — 99214 OFFICE O/P EST MOD 30 MIN: CPT | Performed by: INTERNAL MEDICINE

## 2021-10-21 PROCEDURE — G8420 CALC BMI NORM PARAMETERS: HCPCS | Performed by: INTERNAL MEDICINE

## 2021-10-21 PROCEDURE — G8427 DOCREV CUR MEDS BY ELIG CLIN: HCPCS | Performed by: INTERNAL MEDICINE

## 2021-10-21 PROCEDURE — G8752 SYS BP LESS 140: HCPCS | Performed by: INTERNAL MEDICINE

## 2021-10-21 PROCEDURE — G8536 NO DOC ELDER MAL SCRN: HCPCS | Performed by: INTERNAL MEDICINE

## 2021-10-21 PROCEDURE — 1101F PT FALLS ASSESS-DOCD LE1/YR: CPT | Performed by: INTERNAL MEDICINE

## 2021-10-21 PROCEDURE — G8432 DEP SCR NOT DOC, RNG: HCPCS | Performed by: INTERNAL MEDICINE

## 2021-10-21 PROCEDURE — 1090F PRES/ABSN URINE INCON ASSESS: CPT | Performed by: INTERNAL MEDICINE

## 2021-10-21 PROCEDURE — G8754 DIAS BP LESS 90: HCPCS | Performed by: INTERNAL MEDICINE

## 2021-10-21 RX ORDER — AMLODIPINE BESYLATE 5 MG/1
5 TABLET ORAL DAILY
COMMUNITY
Start: 2021-07-28 | End: 2022-01-24 | Stop reason: ALTCHOICE

## 2021-10-21 RX ORDER — ATORVASTATIN CALCIUM 10 MG/1
TABLET, FILM COATED ORAL
Qty: 45 TABLET | Refills: 1 | Status: SHIPPED | OUTPATIENT
Start: 2021-10-21 | End: 2022-04-21

## 2021-10-21 RX ORDER — TRAZODONE HYDROCHLORIDE 50 MG/1
25 TABLET ORAL
Qty: 30 TABLET | Refills: 5 | Status: SHIPPED | OUTPATIENT
Start: 2021-10-21 | End: 2022-01-06 | Stop reason: ALTCHOICE

## 2021-10-21 NOTE — PROGRESS NOTES
HISTORY OF PRESENT ILLNESS  Rosalia Humphreys is a [de-identified] y.o. female. HPI  Seen for med check. Issues:  1. Dyslipidemia, taking half an Atorvastatin 10. No myalgias. Due for lipids. 2. Followed at [facility] for abnormal lung CT. She has another scan scheduled in December. Breathing is fairly stable. 3. HTN, on Amlodipine 5, Valsartan 320. Does have mild dependent edema. No chest pain or changes in breathing, no headaches. 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 4. History of a-fib. Does remain on Eliquis. Has some bruising, but no bleeding. Currently rate controlled. Encouraged to have cardiology follow up. 5. Endorses significant trouble with sleep. Review of Systems   Constitutional: Negative for chills, fever and weight loss. Respiratory: Negative for cough, shortness of breath and wheezing. Cardiovascular: Negative for chest pain, palpitations, orthopnea, leg swelling and PND. Gastrointestinal: Negative for abdominal pain, blood in stool, heartburn and nausea. Musculoskeletal: Negative for myalgias. Neurological: Negative for dizziness and headaches. Psychiatric/Behavioral: The patient is nervous/anxious and has insomnia. Physical Exam  Vitals and nursing note reviewed. Constitutional:       Appearance: She is well-developed. HENT:      Head: Normocephalic and atraumatic. Neck:      Thyroid: No thyromegaly. Vascular: No carotid bruit. Cardiovascular:      Rate and Rhythm: Normal rate. Rhythm irregular. Heart sounds: Normal heart sounds, S1 normal and S2 normal. No murmur heard. Pulmonary:      Effort: Pulmonary effort is normal. No respiratory distress. Breath sounds: Normal breath sounds. No wheezing or rales. Musculoskeletal:      Cervical back: Normal range of motion and neck supple. Right lower leg: Edema present. Left lower leg: Edema (trace bilat) present.    Neurological:      Mental Status: She is alert and oriented to person, place, and time. Psychiatric:         Behavior: Behavior normal.         ASSESSMENT and PLAN  Diagnoses and all orders for this visit:    1. Dyslipidemia  -     METABOLIC PANEL, COMPREHENSIVE; Future  -     LIPID PANEL; Future  -     TSH 3RD GENERATION; Future  -     atorvastatin (LIPITOR) 10 mg tablet; 1/2 po qd    2. Primary hypertension    3. Paroxysmal atrial fibrillation (HCC)    4. Edema leg    5. Adjustment insomnia  -     traZODone (DESYREL) 50 mg tablet; Take 0.5 Tablets by mouth nightly.       the following changes in treatment are made: trazodone low dose for sleep  See cardiology  appt in 6mo

## 2021-11-22 ENCOUNTER — HOSPITAL ENCOUNTER (OUTPATIENT)
Dept: GENERAL RADIOLOGY | Age: 81
Discharge: HOME | End: 2021-11-22
Payer: MEDICARE

## 2021-11-22 ENCOUNTER — TELEPHONE (OUTPATIENT)
Dept: INTERNAL MEDICINE CLINIC | Age: 81
End: 2021-11-22

## 2021-11-22 ENCOUNTER — OFFICE VISIT (OUTPATIENT)
Dept: INTERNAL MEDICINE CLINIC | Age: 81
End: 2021-11-22
Payer: MEDICARE

## 2021-11-22 VITALS
DIASTOLIC BLOOD PRESSURE: 84 MMHG | TEMPERATURE: 97.7 F | SYSTOLIC BLOOD PRESSURE: 127 MMHG | WEIGHT: 118 LBS | OXYGEN SATURATION: 99 % | HEART RATE: 122 BPM | RESPIRATION RATE: 16 BRPM | BODY MASS INDEX: 19.66 KG/M2 | HEIGHT: 65 IN

## 2021-11-22 DIAGNOSIS — J47.9 BRONCHIECTASIS WITHOUT COMPLICATION (HCC): ICD-10-CM

## 2021-11-22 DIAGNOSIS — R06.02 SOB (SHORTNESS OF BREATH): ICD-10-CM

## 2021-11-22 DIAGNOSIS — I10 PRIMARY HYPERTENSION: Primary | ICD-10-CM

## 2021-11-22 DIAGNOSIS — I48.0 PAROXYSMAL ATRIAL FIBRILLATION (HCC): ICD-10-CM

## 2021-11-22 PROCEDURE — G8427 DOCREV CUR MEDS BY ELIG CLIN: HCPCS | Performed by: INTERNAL MEDICINE

## 2021-11-22 PROCEDURE — G8510 SCR DEP NEG, NO PLAN REQD: HCPCS | Performed by: INTERNAL MEDICINE

## 2021-11-22 PROCEDURE — 71046 X-RAY EXAM CHEST 2 VIEWS: CPT

## 2021-11-22 PROCEDURE — G8754 DIAS BP LESS 90: HCPCS | Performed by: INTERNAL MEDICINE

## 2021-11-22 PROCEDURE — G8536 NO DOC ELDER MAL SCRN: HCPCS | Performed by: INTERNAL MEDICINE

## 2021-11-22 PROCEDURE — 1101F PT FALLS ASSESS-DOCD LE1/YR: CPT | Performed by: INTERNAL MEDICINE

## 2021-11-22 PROCEDURE — G8752 SYS BP LESS 140: HCPCS | Performed by: INTERNAL MEDICINE

## 2021-11-22 PROCEDURE — 99214 OFFICE O/P EST MOD 30 MIN: CPT | Performed by: INTERNAL MEDICINE

## 2021-11-22 PROCEDURE — G8420 CALC BMI NORM PARAMETERS: HCPCS | Performed by: INTERNAL MEDICINE

## 2021-11-22 PROCEDURE — G0463 HOSPITAL OUTPT CLINIC VISIT: HCPCS | Performed by: INTERNAL MEDICINE

## 2021-11-22 PROCEDURE — 1090F PRES/ABSN URINE INCON ASSESS: CPT | Performed by: INTERNAL MEDICINE

## 2021-11-22 RX ORDER — BENZONATATE 100 MG/1
100 CAPSULE ORAL
COMMUNITY
End: 2022-05-11

## 2021-11-22 RX ORDER — DOXYCYCLINE 100 MG/1
100 CAPSULE ORAL 2 TIMES DAILY
Qty: 20 CAPSULE | Refills: 0 | Status: SHIPPED | OUTPATIENT
Start: 2021-11-22 | End: 2022-01-06 | Stop reason: ALTCHOICE

## 2021-11-22 NOTE — TELEPHONE ENCOUNTER
----- Message from Ravi Dooley sent at 11/22/2021  8:16 AM EST -----  Subject: Message to Provider    QUESTIONS  Information for Provider? Estuardo Scott would like to speak to the PA about the   cough she still has that is better, but it is still bothering her and the   dr had told her she may need an X-ray. She is not sure if she should come   there first or go for the X-ray first. she is trying to be proactive as   she does not want pneumonia and have to go to the hospital. Please have   the PA call her.  ---------------------------------------------------------------------------  --------------  CALL BACK INFO  What is the best way for the office to contact you? Do not leave any   message, patient will call back for answer  Preferred Call Back Phone Number? [phone]  ---------------------------------------------------------------------------  --------------  SCRIPT ANSWERS  Relationship to Patient?  Self

## 2021-11-22 NOTE — PROGRESS NOTES
Can you let her know there is something going on in middle part of her right lung - I want to make sure everything is ok and want to order a CT scan- is she willing?  Of chest

## 2021-11-22 NOTE — TELEPHONE ENCOUNTER
Returned call to patient. She reports having a wet cough since last Thur. She denies significant trouble breathing, fevers, chills & body aches. She is fully vaccinated & no knows covid positive contacts. She has h/o pneumonia. She states the mucus she is coughing up is whitish-not dark & discolored. She used an old tessalon yessi rx over the weekend which has helped significantly. She would like her lungs listened to & chest x-ray if indicated. Patient scheduled to see Sia Ornelas today at 11:30 for a lung eval. Patient was thankful for the appt.

## 2021-11-22 NOTE — PROGRESS NOTES
Corrie Humphreys (: 1940) is a [de-identified] y.o. female, established patient, here for evaluation of the following chief complaint(s):  Cough (started last week)       ASSESSMENT/PLAN:  Below is the assessment and plan developed based on review of pertinent history, physical exam, labs, studies, and medications. 1. Primary hypertension  BP is at goal. I do not recommend any change in medications. Continue with ongoing regimen of Amlodipine, valsartan, and coreg. 2. Bronchiectasis without complication (HCC)  -     XR CHEST PA LAT; Future  -     doxycycline (MONODOX) 100 mg capsule; Take 1 Capsule by mouth two (2) times a day., Normal, Disp-20 Capsule, R-0  Given her hx of bronchiectasis and longevity of sx, I will rx doxycycline as I presume she has an infection. I will order a chest x-ray and encouraged her to continue taking Tessalon Perles to manage her cough. 3. Paroxysmal atrial fibrillation (HCC)  Stable and well-managed. Continue with ongoing regimen of Eliquis and Tikosyn. 4. SOB (shortness of breath)  -     XR CHEST PA LAT; Future  -     doxycycline (MONODOX) 100 mg capsule; Take 1 Capsule by mouth two (2) times a day., Normal, Disp-20 Capsule, R-0  Given her hx of bronchiectasis and longevity of sx, I will rx doxycycline as I presume she has an infection. I will order a chest x-ray and encouraged her to continue taking Tessalon Perles to manage her cough. Will continue to monitor for improvements or changes. No follow-ups on file. SUBJECTIVE/OBJECTIVE:  HPI     Cough: Pt believes that she is suffering from pneumonia again as she has a cough and SOB persisting for a week. She denies ear pain, sore throat, fever, chills, sweats, nausea, vomiting, or nasal congestion, but endorses that she is coughing up large amounts of phlegm. Pt reports a hx of bronchiectasis and is prone to infection.      Hypertension ROS: taking medications as instructed, no medication side effects noted, no TIA's, no chest pain on exertion, no dyspnea on exertion, no swelling of ankles. BP in office today is 127/84. Review of Systems   Respiratory: Positive for cough and shortness of breath. All other systems reviewed and are negative. Physical Exam  Constitutional:       Appearance: Normal appearance. HENT:      Right Ear: Tympanic membrane and external ear normal.      Left Ear: Tympanic membrane and external ear normal.      Mouth/Throat:      Mouth: Mucous membranes are moist.      Pharynx: Oropharynx is clear. Cardiovascular:      Rate and Rhythm: Normal rate and regular rhythm. Pulses: Normal pulses. Heart sounds: Normal heart sounds. Pulmonary:      Effort: Pulmonary effort is normal.      Breath sounds: Normal breath sounds. Musculoskeletal:         General: Normal range of motion. Skin:     General: Skin is warm and dry. Neurological:      General: No focal deficit present. Mental Status: She is alert and oriented to person, place, and time. Psychiatric:         Mood and Affect: Mood normal.         Behavior: Behavior normal.     On this date 11/22/2021 I have spent 30 minutes reviewing previous notes, test results and face to face with the patient discussing the diagnosis and importance of compliance with the treatment plan as well as documenting on the day of the visit. An electronic signature was used to authenticate this note. Written by Leila Saleh as dictated by Dr. Som Maguire.    -- Leila Saleh

## 2021-11-23 ENCOUNTER — TELEPHONE (OUTPATIENT)
Dept: INTERNAL MEDICINE CLINIC | Age: 81
End: 2021-11-23

## 2021-11-23 NOTE — TELEPHONE ENCOUNTER
----- Message from Praful Izaguirre sent at 11/23/2021  8:59 AM EST -----  Subject: Message to Provider    QUESTIONS  Information for Provider? pt returning call, dr Adelina Butts wanted to schedule a   ct for her. Pt says she gets them done every 3 months from Dr Josette Schultz in   [facility] . his phone # is [phone] Her next   scan is 12/8/21 with him . She wants to know if its okay that she just   continues her normal scans and does not get the one recommended by Dr Adelina Butts  ---------------------------------------------------------------------------  --------------  [address]  What is the best way for the office to contact you? OK to leave message on   voicemail  Preferred Call Back Phone Number? [phone]  ---------------------------------------------------------------------------  --------------  SCRIPT ANSWERS  Relationship to Patient?  Self

## 2021-11-23 NOTE — PROGRESS NOTES
ERIN I asked Kinga Fernandez to call her yesterday to see if we can get a CT of chest unless you do not feel this is necessary given her history?

## 2021-11-23 NOTE — TELEPHONE ENCOUNTER
V/m left for patient notifying MD is fine with her waiting until 12/8 to complete her routine CT scan. Reassured findings on x-ray were not emergent. Office number left if patient has additional questions or concerns.

## 2022-01-05 ENCOUNTER — NURSE TRIAGE (OUTPATIENT)
Dept: OTHER | Facility: CLINIC | Age: 82
End: 2022-01-05

## 2022-01-05 ENCOUNTER — TELEPHONE (OUTPATIENT)
Dept: INTERNAL MEDICINE CLINIC | Age: 82
End: 2022-01-05
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
## 2022-01-06 ENCOUNTER — OFFICE VISIT (OUTPATIENT)
Dept: INTERNAL MEDICINE CLINIC | Age: 82
End: 2022-01-06
Payer: MEDICARE

## 2022-01-06 ENCOUNTER — HOSPITAL ENCOUNTER (OUTPATIENT)
Dept: GENERAL RADIOLOGY | Age: 82
Discharge: HOME | End: 2022-01-06
Payer: MEDICARE

## 2022-01-06 ENCOUNTER — TELEPHONE (OUTPATIENT)
Dept: INTERNAL MEDICINE CLINIC | Age: 82
End: 2022-01-06

## 2022-01-06 VITALS
OXYGEN SATURATION: 90 % | WEIGHT: 121.8 LBS | RESPIRATION RATE: 16 BRPM | SYSTOLIC BLOOD PRESSURE: 124 MMHG | HEART RATE: 105 BPM | DIASTOLIC BLOOD PRESSURE: 82 MMHG | HEIGHT: 65 IN | BODY MASS INDEX: 20.29 KG/M2

## 2022-01-06 DIAGNOSIS — I48.0 PAROXYSMAL ATRIAL FIBRILLATION (HCC): ICD-10-CM

## 2022-01-06 DIAGNOSIS — R07.89 STERNAL PAIN: Primary | ICD-10-CM

## 2022-01-06 DIAGNOSIS — R07.89 STERNAL PAIN: ICD-10-CM

## 2022-01-06 DIAGNOSIS — C80.1 MALIGNANT (PRIMARY) NEOPLASM, UNSPECIFIED (HCC): ICD-10-CM

## 2022-01-06 DIAGNOSIS — J47.9 BRONCHIECTASIS WITHOUT COMPLICATION (HCC): ICD-10-CM
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
## 2022-01-06 PROCEDURE — G8420 CALC BMI NORM PARAMETERS: HCPCS | Performed by: INTERNAL MEDICINE

## 2022-01-06 PROCEDURE — 1101F PT FALLS ASSESS-DOCD LE1/YR: CPT | Performed by: INTERNAL MEDICINE

## 2022-01-06 PROCEDURE — G0463 HOSPITAL OUTPT CLINIC VISIT: HCPCS | Performed by: INTERNAL MEDICINE

## 2022-01-06 PROCEDURE — G8752 SYS BP LESS 140: HCPCS | Performed by: INTERNAL MEDICINE

## 2022-01-06 PROCEDURE — G8536 NO DOC ELDER MAL SCRN: HCPCS | Performed by: INTERNAL MEDICINE

## 2022-01-06 PROCEDURE — 1090F PRES/ABSN URINE INCON ASSESS: CPT | Performed by: INTERNAL MEDICINE

## 2022-01-06 PROCEDURE — G8432 DEP SCR NOT DOC, RNG: HCPCS | Performed by: INTERNAL MEDICINE

## 2022-01-06 PROCEDURE — G8427 DOCREV CUR MEDS BY ELIG CLIN: HCPCS | Performed by: INTERNAL MEDICINE

## 2022-01-06 PROCEDURE — 71046 X-RAY EXAM CHEST 2 VIEWS: CPT

## 2022-01-06 PROCEDURE — 99214 OFFICE O/P EST MOD 30 MIN: CPT | Performed by: INTERNAL MEDICINE

## 2022-01-06 PROCEDURE — G8754 DIAS BP LESS 90: HCPCS | Performed by: INTERNAL MEDICINE

## 2022-01-06 RX ORDER — DOXYCYCLINE 100 MG/1
100 TABLET ORAL 2 TIMES DAILY
Qty: 14 TABLET | Refills: 0 | Status: SHIPPED | OUTPATIENT
Start: 2022-01-06 | End: 2022-01-17

## 2022-01-06 RX ORDER — TRAMADOL HYDROCHLORIDE 50 MG/1
50 TABLET ORAL
Qty: 28 TABLET | Refills: 0 | Status: SHIPPED | OUTPATIENT
Start: 2022-01-06 | End: 2022-01-20

## 2022-01-06 RX ORDER — AMOXICILLIN AND CLAVULANATE POTASSIUM 875; 125 MG/1; MG/1
1 TABLET, FILM COATED ORAL EVERY 12 HOURS
COMMUNITY
End: 2022-01-24 | Stop reason: ALTCHOICE

## 2022-01-06 NOTE — PATIENT INSTRUCTIONS
sennokot to take daily with the tramadol to avoid constipation  May take the tramadol up to twice a day  But can start taking it just at bedtime and continue the tylenol in the day
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
## 2022-01-07 NOTE — TELEPHONE ENCOUNTER
I called in doxycycline to cover the pneumonia seen on cxr-we spoke in the hallway earlier today about the xray findings and I told her I would call this in  Advised appt in 2 weeks for follow up

## 2022-01-10 ENCOUNTER — OFFICE VISIT (OUTPATIENT)
Dept: CARDIOLOGY CLINIC | Age: 82
End: 2022-01-10
Payer: MEDICARE

## 2022-01-10 VITALS
BODY MASS INDEX: 20.33 KG/M2 | HEIGHT: 65 IN | SYSTOLIC BLOOD PRESSURE: 110 MMHG | DIASTOLIC BLOOD PRESSURE: 60 MMHG | OXYGEN SATURATION: 94 % | HEART RATE: 85 BPM | RESPIRATION RATE: 16 BRPM | WEIGHT: 122 LBS

## 2022-01-10 DIAGNOSIS — I48.19 PERSISTENT ATRIAL FIBRILLATION (HCC): Primary | ICD-10-CM

## 2022-01-10 DIAGNOSIS — E78.5 DYSLIPIDEMIA: ICD-10-CM

## 2022-01-10 DIAGNOSIS — I10 HYPERTENSION, ESSENTIAL, BENIGN: ICD-10-CM

## 2022-01-10 DIAGNOSIS — I49.3 PVC (PREMATURE VENTRICULAR CONTRACTION): ICD-10-CM

## 2022-01-10 DIAGNOSIS — A31.9 ATYPICAL MYCOBACTERIAL DISEASE: ICD-10-CM

## 2022-01-10 PROCEDURE — 1090F PRES/ABSN URINE INCON ASSESS: CPT | Performed by: SPECIALIST

## 2022-01-10 PROCEDURE — G8510 SCR DEP NEG, NO PLAN REQD: HCPCS | Performed by: SPECIALIST

## 2022-01-10 PROCEDURE — 99214 OFFICE O/P EST MOD 30 MIN: CPT | Performed by: SPECIALIST

## 2022-01-10 PROCEDURE — G8420 CALC BMI NORM PARAMETERS: HCPCS | Performed by: SPECIALIST

## 2022-01-10 PROCEDURE — G8427 DOCREV CUR MEDS BY ELIG CLIN: HCPCS | Performed by: SPECIALIST

## 2022-01-10 PROCEDURE — G8754 DIAS BP LESS 90: HCPCS | Performed by: SPECIALIST

## 2022-01-10 PROCEDURE — G8752 SYS BP LESS 140: HCPCS | Performed by: SPECIALIST

## 2022-01-10 PROCEDURE — G8536 NO DOC ELDER MAL SCRN: HCPCS | Performed by: SPECIALIST

## 2022-01-10 PROCEDURE — G0463 HOSPITAL OUTPT CLINIC VISIT: HCPCS | Performed by: SPECIALIST

## 2022-01-10 PROCEDURE — 1101F PT FALLS ASSESS-DOCD LE1/YR: CPT | Performed by: SPECIALIST
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
## 2022-01-10 NOTE — Clinical Note
1/10/2022    Patient: Jed Humphreys   YOB: 1940   Date of Visit: 1/10/2022     [address] [facility]  [address]  [address] [facility]  Via In Basket    Dear Ashley South MD,      Thank you for referring Ms. Antony Humphreys to [facility] for evaluation. My notes for this consultation are attached. If you have questions, please do not hesitate to call me. I look forward to following your patient along with you.       Sincerely,    Blossom Jacobo MD

## 2022-01-17 RX ORDER — VALSARTAN 320 MG/1
320 TABLET ORAL DAILY
Qty: 90 TABLET | Refills: 1 | Status: SHIPPED | OUTPATIENT
Start: 2022-01-17 | End: 2022-07-16

## 2022-01-24 ENCOUNTER — OFFICE VISIT (OUTPATIENT)
Dept: INTERNAL MEDICINE CLINIC | Age: 82
End: 2022-01-24
Payer: MEDICARE

## 2022-01-24 VITALS
HEART RATE: 120 BPM | HEIGHT: 65 IN | OXYGEN SATURATION: 98 % | RESPIRATION RATE: 16 BRPM | WEIGHT: 117.6 LBS | DIASTOLIC BLOOD PRESSURE: 87 MMHG | BODY MASS INDEX: 19.59 KG/M2 | SYSTOLIC BLOOD PRESSURE: 139 MMHG

## 2022-01-24 DIAGNOSIS — R07.89 STERNAL PAIN: Primary | ICD-10-CM

## 2022-01-24 DIAGNOSIS — R91.1 LUNG NODULE: ICD-10-CM

## 2022-01-24 DIAGNOSIS — I82.409 DEEP VEIN THROMBOSIS (DVT) OF LOWER EXTREMITY, UNSPECIFIED CHRONICITY, UNSPECIFIED LATERALITY, UNSPECIFIED VEIN (HCC): ICD-10-CM

## 2022-01-24 DIAGNOSIS — I48.0 PAROXYSMAL ATRIAL FIBRILLATION (HCC): ICD-10-CM

## 2022-01-24 DIAGNOSIS — J98.4 CHRONIC LUNG DISEASE: ICD-10-CM

## 2022-01-24 DIAGNOSIS — I50.20 SYSTOLIC HEART FAILURE, UNSPECIFIED HF CHRONICITY (HCC): ICD-10-CM

## 2022-01-24 PROCEDURE — 1101F PT FALLS ASSESS-DOCD LE1/YR: CPT | Performed by: INTERNAL MEDICINE

## 2022-01-24 PROCEDURE — 99214 OFFICE O/P EST MOD 30 MIN: CPT | Performed by: INTERNAL MEDICINE

## 2022-01-24 PROCEDURE — G8432 DEP SCR NOT DOC, RNG: HCPCS | Performed by: INTERNAL MEDICINE

## 2022-01-24 PROCEDURE — G8754 DIAS BP LESS 90: HCPCS | Performed by: INTERNAL MEDICINE

## 2022-01-24 PROCEDURE — G8536 NO DOC ELDER MAL SCRN: HCPCS | Performed by: INTERNAL MEDICINE

## 2022-01-24 PROCEDURE — G8420 CALC BMI NORM PARAMETERS: HCPCS | Performed by: INTERNAL MEDICINE

## 2022-01-24 PROCEDURE — G8752 SYS BP LESS 140: HCPCS | Performed by: INTERNAL MEDICINE

## 2022-01-24 PROCEDURE — G0463 HOSPITAL OUTPT CLINIC VISIT: HCPCS | Performed by: INTERNAL MEDICINE

## 2022-01-24 PROCEDURE — G8427 DOCREV CUR MEDS BY ELIG CLIN: HCPCS | Performed by: INTERNAL MEDICINE

## 2022-01-24 PROCEDURE — 1090F PRES/ABSN URINE INCON ASSESS: CPT | Performed by: INTERNAL MEDICINE

## 2022-01-24 RX ORDER — CARVEDILOL 3.12 MG/1
6.25 TABLET ORAL 2 TIMES DAILY WITH MEALS
Qty: 120 TABLET | Refills: 2 | Status: SHIPPED | OUTPATIENT
Start: 2022-01-24 | End: 2022-04-25 | Stop reason: SDUPTHER

## 2022-01-24 RX ORDER — AMOXICILLIN AND CLAVULANATE POTASSIUM 875; 125 MG/1; MG/1
1 TABLET, FILM COATED ORAL EVERY 12 HOURS
Qty: 14 TABLET | Refills: 0 | Status: SHIPPED | OUTPATIENT
Start: 2022-01-24 | End: 2022-05-11

## 2022-01-24 NOTE — Clinical Note
Dr Natalie Yee [phone] did bronch can you get the report and the pathology report from biopsy taken at [address] [place] [address] thanks
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
## 2022-01-26 ENCOUNTER — TELEPHONE (OUTPATIENT)
Dept: INTERNAL MEDICINE CLINIC | Age: 82
End: 2022-01-26

## 2022-01-26 NOTE — TELEPHONE ENCOUNTER
I got a report on the bronch from dr Mayito Contreras at [facility]  No endobronchial masses seen  Cytology negative  Fungal stains neg on bal  afb positive but final results pending  I left her a report that no sign of cancer seen on reports from [facility]

## 2022-01-27 ENCOUNTER — TELEPHONE (OUTPATIENT)
Dept: INTERNAL MEDICINE CLINIC | Age: 82
End: 2022-01-27

## 2022-01-27 NOTE — TELEPHONE ENCOUNTER
Patient is calling to thank the doctor and let her know that she has been scheduled for a cardioversion on February 3rd.

## 2022-02-20 DIAGNOSIS — I48.19 PERSISTENT ATRIAL FIBRILLATION (HCC): ICD-10-CM

## 2022-02-20 RX ORDER — APIXABAN 2.5 MG/1
TABLET, FILM COATED ORAL
Qty: 180 TABLET | Refills: 3 | Status: SHIPPED | OUTPATIENT
Start: 2022-02-20 | End: 2022-05-11 | Stop reason: SDUPTHER

## 2022-03-18 PROBLEM — I48.91 ATRIAL FIBRILLATION WITH RVR (HCC): Status: ACTIVE | Noted: 2021-05-25

## 2022-03-19 PROBLEM — A31.9 ATYPICAL MYCOBACTERIAL DISEASE: Status: ACTIVE | Noted: 2018-06-04

## 2022-03-19 PROBLEM — I48.0 PAROXYSMAL ATRIAL FIBRILLATION (HCC): Status: ACTIVE | Noted: 2019-11-14

## 2022-03-19 PROBLEM — R06.09 DYSPNEA ON EXERTION: Status: ACTIVE | Noted: 2018-02-08

## 2022-03-19 PROBLEM — N28.9 KIDNEY LESION: Status: ACTIVE | Noted: 2018-02-08

## 2022-04-20 DIAGNOSIS — E78.5 DYSLIPIDEMIA: ICD-10-CM

## 2022-04-21 RX ORDER — ATORVASTATIN CALCIUM 10 MG/1
TABLET, FILM COATED ORAL
Qty: 45 TABLET | Refills: 1 | Status: SHIPPED | OUTPATIENT
Start: 2022-04-21 | End: 2022-10-06

## 2022-04-25 DIAGNOSIS — I48.0 PAROXYSMAL ATRIAL FIBRILLATION (HCC): ICD-10-CM

## 2022-04-25 RX ORDER — CARVEDILOL 3.12 MG/1
6.25 TABLET ORAL 2 TIMES DAILY WITH MEALS
Qty: 120 TABLET | Refills: 2 | Status: SHIPPED | OUTPATIENT
Start: 2022-04-25 | End: 2022-05-11

## 2022-05-11 ENCOUNTER — OFFICE VISIT (OUTPATIENT)
Dept: INTERNAL MEDICINE CLINIC | Age: 82
End: 2022-05-11
Payer: MEDICARE

## 2022-05-11 VITALS
HEIGHT: 65 IN | BODY MASS INDEX: 19.49 KG/M2 | RESPIRATION RATE: 16 BRPM | TEMPERATURE: 97.5 F | DIASTOLIC BLOOD PRESSURE: 96 MMHG | OXYGEN SATURATION: 99 % | SYSTOLIC BLOOD PRESSURE: 150 MMHG | HEART RATE: 90 BPM | WEIGHT: 117 LBS

## 2022-05-11 DIAGNOSIS — I50.20 SYSTOLIC HEART FAILURE, UNSPECIFIED HF CHRONICITY (HCC): ICD-10-CM

## 2022-05-11 DIAGNOSIS — I10 HTN, GOAL BELOW 130/80: Primary | ICD-10-CM

## 2022-05-11 DIAGNOSIS — I48.19 PERSISTENT ATRIAL FIBRILLATION (HCC): ICD-10-CM

## 2022-05-11 DIAGNOSIS — E78.5 DYSLIPIDEMIA: ICD-10-CM

## 2022-05-11 DIAGNOSIS — J98.4 CHRONIC LUNG DISEASE: ICD-10-CM

## 2022-05-11 DIAGNOSIS — Z00.00 MEDICARE ANNUAL WELLNESS VISIT, SUBSEQUENT: ICD-10-CM

## 2022-05-11 PROCEDURE — G8536 NO DOC ELDER MAL SCRN: HCPCS | Performed by: INTERNAL MEDICINE

## 2022-05-11 PROCEDURE — 1101F PT FALLS ASSESS-DOCD LE1/YR: CPT | Performed by: INTERNAL MEDICINE

## 2022-05-11 PROCEDURE — G0439 PPPS, SUBSEQ VISIT: HCPCS | Performed by: INTERNAL MEDICINE

## 2022-05-11 PROCEDURE — G8432 DEP SCR NOT DOC, RNG: HCPCS | Performed by: INTERNAL MEDICINE

## 2022-05-11 PROCEDURE — G8755 DIAS BP > OR = 90: HCPCS | Performed by: INTERNAL MEDICINE

## 2022-05-11 PROCEDURE — G0463 HOSPITAL OUTPT CLINIC VISIT: HCPCS | Performed by: INTERNAL MEDICINE

## 2022-05-11 PROCEDURE — G8420 CALC BMI NORM PARAMETERS: HCPCS | Performed by: INTERNAL MEDICINE

## 2022-05-11 PROCEDURE — G8753 SYS BP > OR = 140: HCPCS | Performed by: INTERNAL MEDICINE

## 2022-05-11 PROCEDURE — 99214 OFFICE O/P EST MOD 30 MIN: CPT | Performed by: INTERNAL MEDICINE

## 2022-05-11 PROCEDURE — 1090F PRES/ABSN URINE INCON ASSESS: CPT | Performed by: INTERNAL MEDICINE

## 2022-05-11 PROCEDURE — G8427 DOCREV CUR MEDS BY ELIG CLIN: HCPCS | Performed by: INTERNAL MEDICINE

## 2022-05-11 RX ORDER — CARVEDILOL 3.12 MG/1
6.25 TABLET ORAL 2 TIMES DAILY WITH MEALS
Qty: 60 TABLET | Refills: 2
Start: 2022-05-11 | End: 2022-07-23
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
## 2022-05-11 NOTE — PATIENT INSTRUCTIONS
Please increase the coreg from daily to twice a day until you see cardiology  Medicare Wellness Visit, Female     The best way to live healthy is to have a lifestyle where you eat a well-balanced diet, exercise regularly, limit alcohol use, and quit all forms of tobacco/nicotine, if applicable. Regular preventive services are another way to keep healthy. Preventive services (vaccines, screening tests, monitoring & exams) can help personalize your care plan, which helps you manage your own care. Screening tests can find health problems at the earliest stages, when they are easiest to treat. [facility] follows the current, evidence-based guidelines published by the [facility] States [facility]iel [facility] ([facility]) when recommending preventive services for our patients. Because we follow these guidelines, sometimes recommendations change over time as research supports it. (For example, mammograms used to be recommended annually. Even though Medicare will still pay for an annual mammogram, the newer guidelines recommend a mammogram every two years for women of average risk). Of course, you and your doctor may decide to screen more often for some diseases, based on your risk and your co-morbidities (chronic disease you are already diagnosed with). Preventive services for you include:  - Medicare offers their members a free annual wellness visit, which is time for you and your primary care provider to discuss and plan for your preventive service needs. Take advantage of this benefit every year!  -All adults over the age of 72 should receive the recommended pneumonia vaccines. Current USPSTF guidelines recommend a series of two vaccines for the best pneumonia protection.   -All adults should have a flu vaccine yearly and a tetanus vaccine every 10 years.   -All adults age 48 and older should receive the shingles vaccines (series of two vaccines).       -All adults age 38-68 who are overweight should have a diabetes screening test once every three years.   -All adults born between [birth year] and 1964 should be screened once for Hepatitis C.  -Other screening tests and preventive services for persons with diabetes include: an eye exam to screen for diabetic retinopathy, a kidney function test, a foot exam, and stricter control over your cholesterol.   -Cardiovascular screening for adults with routine risk involves an electrocardiogram (ECG) at intervals determined by your doctor.   -Colorectal cancer screenings should be done for adults age 54-65 with no increased risk factors for colorectal cancer. There are a number of acceptable methods of screening for this type of cancer. Each test has its own benefits and drawbacks. Discuss with your doctor what is most appropriate for you during your annual wellness visit. The different tests include: colonoscopy (considered the best screening method), a fecal occult blood test, a fecal DNA test, and sigmoidoscopy.    -A bone mass density test is recommended when a woman turns 65 to screen for osteoporosis. This test is only recommended one time, as a screening. Some providers will use this same test as a disease monitoring tool if you already have osteoporosis. -Breast cancer screenings are recommended every other year for women of normal risk, age 54-69.  -Cervical cancer screenings for women over age 72 are only recommended with certain risk factors.      Here is a list of your current Health Maintenance items (your personalized list of preventive services) with a due date:  Health Maintenance Due   Topic Date Due    Shingles Vaccine (1 of 2) Never done    DTaP/Tdap/Td  (2 - Td or Tdap) 01/18/2022    Annual Well Visit  04/24/2022

## 2022-05-18 ENCOUNTER — TELEPHONE (OUTPATIENT)
Dept: INTERNAL MEDICINE CLINIC | Age: 82
End: 2022-05-18

## 2022-05-18 NOTE — TELEPHONE ENCOUNTER
Returned call to patient. She complains of runny nose, post-nasal drip & irritated throat. She denies fevers, chills, cough, chest congestion at this time. She is currently doing Zyrtec & tylenol. Suggested she add Mucinex dm to help with the drainage & warm liquids for the irritated throat. Discussed the lingering drainage may be the reason for her throat irritation. Advised continue the Zyrtec for allergies & the drainage as well. Patient asked to call back if no improvement in symptoms in a week.

## 2022-05-18 NOTE — TELEPHONE ENCOUNTER
----- Message from Elyse Renzo sent at 5/18/2022 10:41 AM EDT -----  Subject: Message to Provider    QUESTIONS  Information for Provider? PT called in to request a nurse to give her a   call back regarding cold like symptoms. Pt did have Covid vaccine last   week and within the last cpl days she has developed sore throat and   allergy like symptoms. Patient has been taking Tylenol and Zyrtec. Pt   would like a call back to go over what she should be doing. Pt will be at   work until 1. If call is before 1pm please call [phone], after that   contact pt cell phone.   ---------------------------------------------------------------------------  --------------  [address]  What is the best way for the office to contact you? OK to leave message on   voicemail  Preferred Call Back Phone Number? [phone]  ---------------------------------------------------------------------------  --------------  SCRIPT ANSWERS  Relationship to Patient?  Self

## 2022-07-12 ENCOUNTER — OFFICE VISIT (OUTPATIENT)
Dept: INTERNAL MEDICINE CLINIC | Age: 82
End: 2022-07-12
Payer: MEDICARE

## 2022-07-12 VITALS
OXYGEN SATURATION: 96 % | TEMPERATURE: 97.7 F | RESPIRATION RATE: 14 BRPM | SYSTOLIC BLOOD PRESSURE: 152 MMHG | DIASTOLIC BLOOD PRESSURE: 80 MMHG | HEART RATE: 66 BPM | BODY MASS INDEX: 18.59 KG/M2 | HEIGHT: 65 IN | WEIGHT: 111.6 LBS

## 2022-07-12 DIAGNOSIS — H10.12 ALLERGIC CONJUNCTIVITIS OF LEFT EYE: Primary | ICD-10-CM
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
## 2022-07-12 DIAGNOSIS — L84 PRE-ULCERATIVE CORN OR CALLOUS: ICD-10-CM

## 2022-07-12 PROCEDURE — G8432 DEP SCR NOT DOC, RNG: HCPCS | Performed by: NURSE PRACTITIONER

## 2022-07-12 PROCEDURE — G8754 DIAS BP LESS 90: HCPCS | Performed by: NURSE PRACTITIONER

## 2022-07-12 PROCEDURE — G8536 NO DOC ELDER MAL SCRN: HCPCS | Performed by: NURSE PRACTITIONER

## 2022-07-12 PROCEDURE — G8753 SYS BP > OR = 140: HCPCS | Performed by: NURSE PRACTITIONER

## 2022-07-12 PROCEDURE — G8427 DOCREV CUR MEDS BY ELIG CLIN: HCPCS | Performed by: NURSE PRACTITIONER

## 2022-07-12 PROCEDURE — G0463 HOSPITAL OUTPT CLINIC VISIT: HCPCS | Performed by: NURSE PRACTITIONER

## 2022-07-12 PROCEDURE — G8420 CALC BMI NORM PARAMETERS: HCPCS | Performed by: NURSE PRACTITIONER

## 2022-07-12 PROCEDURE — 1101F PT FALLS ASSESS-DOCD LE1/YR: CPT | Performed by: NURSE PRACTITIONER

## 2022-07-12 PROCEDURE — 1090F PRES/ABSN URINE INCON ASSESS: CPT | Performed by: NURSE PRACTITIONER

## 2022-07-12 PROCEDURE — 99214 OFFICE O/P EST MOD 30 MIN: CPT | Performed by: NURSE PRACTITIONER

## 2022-07-12 RX ORDER — KETOTIFEN FUMARATE 0.35 MG/ML
1 SOLUTION/ DROPS OPHTHALMIC 2 TIMES DAILY
Qty: 5 ML | Refills: 0
Start: 2022-07-12 | End: 2022-07-22

## 2022-07-12 NOTE — PATIENT INSTRUCTIONS
Pinkeye: Care Instructions  Overview     Pinkeye is redness and swelling of the eye surface and the conjunctiva (the lining of the eyelid and the covering of the white part of the eye). Pinkeye is also called conjunctivitis. Pinkeye is often caused by infection with bacteria or a virus. Dry air, allergies, smoke, and chemicals are other common causes. Pinkeye often gets better on its own in 7 to 10 days. Antibiotics only help if the pinkeye is caused by bacteria. Pinkeye caused by infection spreads easily. If an allergy or chemical is causing pinkeye, it will not go away unless you can avoid whatever is causing it. Follow-up care is a key part of your treatment and safety. Be sure to make and go to all appointments, and call your doctor if you are having problems. It's also a good idea to know your test results and keep a list of the medicines you take. How can you care for yourself at home? · Wash your hands often. Always wash them before and after you treat pinkeye or touch your eyes or face. · Use moist cotton or a clean, wet cloth to remove crust. Wipe from the inside corner of the eye to the outside. Use a clean part of the cloth for each wipe. · Put cold or warm wet cloths on your eye a few times a day if the eye hurts. · Do not wear contact lenses or eye makeup until the pinkeye is gone. Throw away any eye makeup you were using when you got pinkeye. Clean your contacts and storage case. If you wear disposable contacts, use a new pair when your eye has cleared and it is safe to wear contacts again. · If the doctor gave you antibiotic ointment or eyedrops, use them as directed. Use the medicine for as long as instructed, even if your eye starts looking better soon. Keep the bottle tip clean, and do not let it touch the eye area. · To put in eyedrops or ointment:  ? Tilt your head back, and pull your lower eyelid down with one finger. ? Drop or squirt the medicine inside the lower lid.   ? Close your eye for 30 to 60 seconds to let the drops or ointment move around. ? Do not touch the ointment or dropper tip to your eyelashes or any other surface. · Do not share towels, pillows, or washcloths while you have pinkeye. When should you call for help? Call your doctor now or seek immediate medical care if:    · You have pain in your eye, not just irritation on the surface.     · You have a change in vision or loss of vision.     · You have an increase in discharge from the eye.     · Your eye has not started to improve or begins to get worse within 48 hours after you start using antibiotics.     · Pinkeye lasts longer than 7 days. Watch closely for changes in your health, and be sure to contact your doctor if you have any problems. Where can you learn more? Go to http://www.gray.com/  Enter Y392 in the search box to learn more about \"Pinkeye: Care Instructions. \"  Current as of: July 1, 2021               Content Version: 13.2  © 2006-2022 [employer]. Care instructions adapted under license by [employer] (which disclaims liability or warranty for this information). If you have questions about a medical condition or this instruction, always ask your healthcare professional. [address] any warranty or liability for your use of this information.

## 2022-07-12 NOTE — PROGRESS NOTES
Faheem Humphreys (: 1940) is a [de-identified] y.o. female, established patient, here for evaluation of the following chief complaint(s):  Eye Problem (eye \"sticky and red\") and Fall (wants her back looked at to check for bruising)       ASSESSMENT/PLAN:  Below is the assessment and plan developed based on review of pertinent history, physical exam, labs, studies, and medications. 1. Allergic conjunctivitis of left eye --no signs of viral or bacterial conjunctivitis; most likely having issues with allergic conjunctivitis. Can use OTC Zaditor eye solution if symptoms recur  -     ketotifen (Zaditor) 0.025 % (0.035 %) ophthalmic solution; Administer 1 Drop to both eyes two (2) times a day for 10 days. , No Print, Disp-5 mL, R-0    2. Fall in home, initial encounter --was seen at [facility] and reports x-rays did not show any fracture; called [facility] for records. 3. Pre-ulcerative corn or callous  -     REFERRAL TO PODIATRY      SUBJECTIVE/OBJECTIVE:  HPI    Patient of Dr. Zendejas  presents with complaints of itching and watery drainage from left eye for several days. Reports noting lashes crusted shut but drainage was primarily watery without purulence. Symptoms have improved since yesterday. Denies any visual disturbances. Reports she had a fall at home on  when she lost her balance after squatting down to lift a box from floor; reports she stepped back and hit left side of back and shoulder blade on a wood door frame. Was seen at [facility] urgent care and x-rays were negative for fracture. Has had mild tenderness at area and has taken occasional Tylenol. Has been having difficulty cutting her own toenails and having issues with a foot callus; was given name of podiatrist in the past but she cannot locate this.     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    Paroxysmal atrial fibrillation (HCC) I48.0    Dyspnea on exertion R06.00    Kidney lesion N28.9    Atrial fibrillation with RVR (HCC) I48.91     Past Surgical History:   Procedure Laterality Date    CARDIOVERSION EXTERNAL  9/18/2013         ENDOSCOPY, COLON, DIAGNOSTIC      8/09 neg    HX BREAST LUMPECTOMY Right     HX COLONOSCOPY      HX ENDOSCOPY      HX GI      colon resection    HX GI      hemorrhoidectomy    HX ORTHOPAEDIC      Mortons neuroma left foot    HX PELVIC LAPAROSCOPY      ovarian cystectomy    HX TONSILLECTOMY       Social History     Socioeconomic History    Marital status:      Spouse name: Not on file    Number of children: Not on file    Years of education: Not on file    Highest education level: Not on file   Occupational History    Not on file   Tobacco Use    Smoking status: Never Smoker    Smokeless tobacco: Never Used   Vaping Use    Vaping Use: Never used   Substance and Sexual Activity    Alcohol use: Yes     Alcohol/week: 1.7 standard drinks     Types: 2 Glasses of wine per week     Comment: occasional    Drug use: No    Sexual activity: Not Currently   Other Topics Concern    Not on file   Social History Narrative    Not on file     Social Determinants of Health     Financial Resource Strain:     Difficulty of Paying Living Expenses: Not on file   Food Insecurity:     Worried About Running Out of Food in the Last Year: Not on file    Mimi of Food in the Last Year: Not on file   Transportation Needs:     Lack of Transportation (Medical):  Not on file    Lack of Transportation (Non-Medical): Not on file   Physical Activity:     Days of Exercise per Week: Not on file    Minutes of Exercise per Session: Not on file   Stress:     Feeling of Stress : Not on file   Social Connections:     Frequency of Communication with Friends and Family: Not on file    Frequency of Social Gatherings with Friends and Family: Not on file    Attends [employer] Services: Not on file    Active Member of [address] [place] [address] or Organizations: Not on file    Attends Club or Organization Meetings: Not on file    Marital Status: Not on file   Intimate Partner Violence:     Fear of Current or Ex-Partner: Not on file    Emotionally Abused: Not on file    Physically Abused: Not on file    Sexually Abused: Not on file   Housing Stability:     Unable to Pay for Housing in the Last Year: Not on file    Number of [place] in the Last Year: Not on file    Unstable Housing in the Last Year: Not on file     Family History   Problem Relation Age of Onset    Stroke Father     Stroke Brother      Current Outpatient Medications   Medication Sig    ketotifen (Zaditor) 0.025 % (0.035 %) ophthalmic solution Administer 1 Drop to both eyes two (2) times a day for 10 days.  amiodarone HCl (AMIODARONE PO) Take  by mouth daily.  apixaban (Eliquis) 2.5 mg tablet Take 1 Tablet by mouth two (2) times a day.  carvediloL (COREG) 3.125 mg tablet Take 2 Tablets by mouth two (2) times daily (with meals).  atorvastatin (LIPITOR) 10 mg tablet TAKE 1/2 TABLET BY MOUTH EVERY DAY    valsartan (DIOVAN) 320 mg tablet TAKE 1 TABLET BY MOUTH DAILY    cetirizine (ZYRTEC) 10 mg tablet Take 1 Tablet by mouth daily as needed for Allergies.  hydrocortisone (HYTONE) 2.5 % topical cream APPLY TO AFFECTED AREA TWICE A DAY    diphenhydrAMINE (Benadryl Allergy) 25 mg tablet Take 25 mg by mouth every six (6) hours as needed.  fluticasone propionate (FLONASE) 50 mcg/actuation nasal spray 2 Sprays by Both Nostrils route daily.     acetaminophen (TYLENOL EXTRA STRENGTH) 500 mg tablet Take  by mouth as needed for Pain. No current facility-administered medications for this visit. Allergies   Allergen Reactions    Ciprofloxacin Hives     Immunization History   Administered Date(s) Administered    (RETIRED) Pneumococcal Vaccine (Unspecified Type) 10/18/2006    COVID-19, PFIZER PURPLE top, DILUTE for use, (age 15 y+), IM, 30mcg/0.3mL 01/22/2021, 02/11/2021, 09/27/2021    Influenza High Dose Vaccine PF 10/04/2017, 10/03/2018, 10/05/2019, 09/27/2021    Influenza Vaccine 12/09/2013, 01/05/2015, 10/15/2015    Influenza Vaccine (Tri) Adjuvanted (>65 Yrs FLUAD TRI 58913) 10/03/2019    Influenza Vaccine PF 12/29/2015    Influenza Vaccine Split 10/20/2011    Influenza, High-dose, Quadrivalent (>65 Yrs Fluzone High Dose Quad 09070) 09/02/2020    Pneumococcal Conjugate (PCV-13) 10/04/2017, 04/03/2019    Pneumococcal Vaccine (Unspecified Type) 10/18/2006    TDAP Vaccine 01/18/2012    Zoster 07/01/2007    Zoster Vaccine, Live 07/01/2007       Review of Systems   Constitutional: Negative for chills and fever. HENT: Negative for congestion, postnasal drip and sore throat. Eyes: Positive for discharge, redness and itching. Negative for pain and visual disturbance. Respiratory: Negative for cough and shortness of breath. Cardiovascular: Negative for chest pain. Musculoskeletal: Positive for back pain. Skin: Negative for rash. BP (!) 152/80 (BP 1 Location: Left upper arm, BP Patient Position: Sitting, BP Cuff Size: Adult) Comment: manual cuff  Pulse 66   Temp 97.7 °F (36.5 °C) (Oral)   Resp 14   Ht 5' 5\" (1.651 m)   Wt 111 lb 9.6 oz (50.6 kg)   LMP  (LMP Unknown)   SpO2 96%   BMI 18.57 kg/m²   Physical Exam  Vitals and nursing note reviewed. Constitutional:       General: She is not in acute distress. Appearance: Normal appearance. HENT:      Head: Normocephalic and atraumatic.       Right Ear: Tympanic membrane, ear canal and external ear normal.      Left Ear: Tympanic membrane, ear canal and external ear normal.      Nose: Nose normal.      Mouth/Throat:      Mouth: Mucous membranes are moist.      Pharynx: Oropharynx is clear. Eyes:      Extraocular Movements: Extraocular movements intact. Conjunctiva/sclera: Conjunctivae normal.   Cardiovascular:      Rate and Rhythm: Normal rate and regular rhythm. Pulmonary:      Effort: Pulmonary effort is normal.   Musculoskeletal:      Cervical back: Normal range of motion and neck supple. Thoracic back: Tenderness present. Back:       Comments: Mild tenderness over left thoracic paraspinal muscles and left scapula; no edema, ecchymosis noted   Skin:     General: Skin is warm and dry. Findings: No bruising or rash. Neurological:      General: No focal deficit present. Mental Status: She is alert and oriented to person, place, and time. Psychiatric:         Mood and Affect: Mood normal.         Behavior: Behavior normal.               An electronic signature was used to authenticate this note.   -- Ashley Estes NP

## 2022-07-16 RX ORDER — VALSARTAN 320 MG/1
320 TABLET ORAL DAILY
Qty: 90 TABLET | Refills: 1 | Status: SHIPPED | OUTPATIENT
Start: 2022-07-16

## 2022-07-23 DIAGNOSIS — I10 HTN, GOAL BELOW 130/80: ICD-10-CM

## 2022-07-23 RX ORDER — CARVEDILOL 3.12 MG/1
TABLET ORAL
Qty: 360 TABLET | Refills: 1 | Status: SHIPPED | OUTPATIENT
Start: 2022-07-23

## 2022-07-26 ENCOUNTER — TELEPHONE (OUTPATIENT)
Dept: INTERNAL MEDICINE CLINIC | Age: 82
End: 2022-07-26

## 2022-07-26 ENCOUNTER — OFFICE VISIT (OUTPATIENT)
Dept: INTERNAL MEDICINE CLINIC | Age: 82
End: 2022-07-26
Payer: MEDICARE

## 2022-07-26 VITALS
TEMPERATURE: 97.5 F | HEART RATE: 53 BPM | BODY MASS INDEX: 18.49 KG/M2 | WEIGHT: 111 LBS | OXYGEN SATURATION: 98 % | SYSTOLIC BLOOD PRESSURE: 190 MMHG | DIASTOLIC BLOOD PRESSURE: 75 MMHG | HEIGHT: 65 IN | RESPIRATION RATE: 16 BRPM

## 2022-07-26 DIAGNOSIS — I10 HTN, GOAL BELOW 130/80: Primary | ICD-10-CM

## 2022-07-26 DIAGNOSIS — R11.0 NAUSEA: ICD-10-CM

## 2022-07-26 DIAGNOSIS — I48.0 PAROXYSMAL ATRIAL FIBRILLATION (HCC): ICD-10-CM

## 2022-07-26 DIAGNOSIS — R00.1 BRADYCARDIA: ICD-10-CM

## 2022-07-26 DIAGNOSIS — R42 DIZZY: ICD-10-CM

## 2022-07-26 PROCEDURE — G0463 HOSPITAL OUTPT CLINIC VISIT: HCPCS | Performed by: INTERNAL MEDICINE

## 2022-07-26 PROCEDURE — 1101F PT FALLS ASSESS-DOCD LE1/YR: CPT | Performed by: INTERNAL MEDICINE

## 2022-07-26 PROCEDURE — G8536 NO DOC ELDER MAL SCRN: HCPCS | Performed by: INTERNAL MEDICINE

## 2022-07-26 PROCEDURE — G8753 SYS BP > OR = 140: HCPCS | Performed by: INTERNAL MEDICINE

## 2022-07-26 PROCEDURE — G8754 DIAS BP LESS 90: HCPCS | Performed by: INTERNAL MEDICINE

## 2022-07-26 PROCEDURE — 99214 OFFICE O/P EST MOD 30 MIN: CPT | Performed by: INTERNAL MEDICINE

## 2022-07-26 PROCEDURE — G8419 CALC BMI OUT NRM PARAM NOF/U: HCPCS | Performed by: INTERNAL MEDICINE

## 2022-07-26 PROCEDURE — G8427 DOCREV CUR MEDS BY ELIG CLIN: HCPCS | Performed by: INTERNAL MEDICINE

## 2022-07-26 PROCEDURE — 1090F PRES/ABSN URINE INCON ASSESS: CPT | Performed by: INTERNAL MEDICINE

## 2022-07-26 PROCEDURE — G8432 DEP SCR NOT DOC, RNG: HCPCS | Performed by: INTERNAL MEDICINE

## 2022-07-26 RX ORDER — AMLODIPINE BESYLATE 5 MG/1
5 TABLET ORAL DAILY
Qty: 30 TABLET | Refills: 1 | Status: SHIPPED | OUTPATIENT
Start: 2022-07-26 | End: 2022-08-18

## 2022-07-26 RX ORDER — ONDANSETRON 4 MG/1
4 TABLET, ORALLY DISINTEGRATING ORAL
Qty: 15 TABLET | Refills: 1 | Status: SHIPPED | OUTPATIENT
Start: 2022-07-26 | End: 2022-08-23 | Stop reason: ALTCHOICE

## 2022-07-26 NOTE — PATIENT INSTRUCTIONS
Low heart rate please decrease the  carvedilol  to 1/2 tablet twice a day  Due to persistent high blood pressure I am adding amlodipine 5 mg daily   Please see cardiology asap for follow up  on this

## 2022-07-26 NOTE — PROGRESS NOTES
HISTORY OF PRESENT ILLNESS  Say Humphreys is a [de-identified] y.o. female. 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 The nausea has been going on for months. She has had at least three falls, but they were all associated with bending over or getting knocked over. She is not having true syncopal spells. She is not having abdominal pain or vomiting. Weight is down about 6 pounds from May. She saw cardiology earlier this month and because of elevated blood pressure her Coreg dose has been increased from 3.125 up to 12.5 mg b.i.d. She is also on Valsartan 320, Eliquis, Lipitor and Amiodarone. Blood pressure is quite elevated today. Denies chest pain. Review of Systems   Constitutional:  Positive for malaise/fatigue. Negative for chills, fever and weight loss. Respiratory:  Negative for cough, shortness of breath and wheezing. Cardiovascular:  Negative for chest pain, palpitations, orthopnea, leg swelling and PND. Gastrointestinal:  Positive for nausea. Negative for abdominal pain, diarrhea, heartburn and vomiting. Musculoskeletal:  Negative for myalgias. Neurological:  Positive for dizziness. Negative for headaches. Physical Exam  Vitals and nursing note reviewed. Constitutional:       Appearance: She is well-developed. HENT:      Head: Normocephalic and atraumatic. Neck:      Thyroid: No thyromegaly. Vascular: No carotid bruit. Cardiovascular:      Rate and Rhythm: Bradycardia present. Rhythm irregular. Heart sounds: Normal heart sounds, S1 normal and S2 normal. No murmur heard. Pulmonary:      Effort: Pulmonary effort is normal. No respiratory distress. Breath sounds: Normal breath sounds. No wheezing or rales. Musculoskeletal:      Cervical back: Normal range of motion and neck supple. Right lower leg: No edema. Left lower leg: No edema. Neurological:      General: No focal deficit present.       Mental Status: She is alert. Psychiatric:         Behavior: Behavior normal.       ASSESSMENT and PLAN  Diagnoses and all orders for this visit:    1. HTN, goal below 130/80  -     amLODIPine (NORVASC) 5 mg tablet; Take 1 Tablet by mouth in the morning. 2. Dizzy    3. Paroxysmal atrial fibrillation (HCC)    4. Nausea  -     ondansetron (ZOFRAN ODT) 4 mg disintegrating tablet; Take 1 Tablet by mouth every eight (8) hours as needed for Nausea or Vomiting. 5. Bradycardia      the following changes in treatment are made: discussed that I am concerned that the inc in coreg although helpful for her bp is decreasing her heart rate-discussed that cardiology has advised a pacer and I think this makes sense-concern for sick sinus syndrome  Discussed persistent nausea-trial of prn zofran?  From amiodarone  Will add low dose norvasc for her bp with dec in dose of  coreg today from 12.5 mg bid to 6.25 mg bid and advised close follow up with cardiology

## 2022-07-26 NOTE — TELEPHONE ENCOUNTER
Incoming call from patient on the NT line c/o dizziness, increase in nausea & elevated bp. At her last few specialist appts her bp has been high. Her cardiologist recently decreased her amiodarone to once a day. Patient is concerned about how she is feeling. She denies chest pain & sob. Advised visit today for eval & bp review. Patient asked to bring her med bottles with her to today's appt.

## 2022-08-18 ENCOUNTER — OFFICE VISIT (OUTPATIENT)
Dept: INTERNAL MEDICINE CLINIC | Age: 82
End: 2022-08-18
Payer: MEDICARE

## 2022-08-18 VITALS
HEIGHT: 65 IN | SYSTOLIC BLOOD PRESSURE: 162 MMHG | RESPIRATION RATE: 14 BRPM | BODY MASS INDEX: 18.56 KG/M2 | TEMPERATURE: 97.7 F | WEIGHT: 111.4 LBS | DIASTOLIC BLOOD PRESSURE: 76 MMHG | HEART RATE: 58 BPM | OXYGEN SATURATION: 97 %

## 2022-08-18 DIAGNOSIS — I10 HTN, GOAL BELOW 130/80: ICD-10-CM

## 2022-08-18 DIAGNOSIS — L03.115 CELLULITIS OF RIGHT LOWER EXTREMITY: ICD-10-CM

## 2022-08-18 DIAGNOSIS — L08.9 INFECTED SKIN TEAR: Primary | ICD-10-CM

## 2022-08-18 DIAGNOSIS — Z23 ENCOUNTER FOR IMMUNIZATION: ICD-10-CM

## 2022-08-18 DIAGNOSIS — T14.8XXA INFECTED SKIN TEAR: Primary | ICD-10-CM

## 2022-08-18 PROCEDURE — G8427 DOCREV CUR MEDS BY ELIG CLIN: HCPCS | Performed by: NURSE PRACTITIONER

## 2022-08-18 PROCEDURE — G8753 SYS BP > OR = 140: HCPCS | Performed by: NURSE PRACTITIONER

## 2022-08-18 PROCEDURE — 99214 OFFICE O/P EST MOD 30 MIN: CPT | Performed by: NURSE PRACTITIONER

## 2022-08-18 PROCEDURE — G8754 DIAS BP LESS 90: HCPCS | Performed by: NURSE PRACTITIONER

## 2022-08-18 PROCEDURE — G8536 NO DOC ELDER MAL SCRN: HCPCS | Performed by: NURSE PRACTITIONER

## 2022-08-18 PROCEDURE — 90715 TDAP VACCINE 7 YRS/> IM: CPT | Performed by: NURSE PRACTITIONER

## 2022-08-18 PROCEDURE — 96372 THER/PROPH/DIAG INJ SC/IM: CPT | Performed by: NURSE PRACTITIONER

## 2022-08-18 PROCEDURE — 1101F PT FALLS ASSESS-DOCD LE1/YR: CPT | Performed by: NURSE PRACTITIONER

## 2022-08-18 PROCEDURE — G8432 DEP SCR NOT DOC, RNG: HCPCS | Performed by: NURSE PRACTITIONER

## 2022-08-18 PROCEDURE — 90471 IMMUNIZATION ADMIN: CPT | Performed by: NURSE PRACTITIONER

## 2022-08-18 PROCEDURE — 1090F PRES/ABSN URINE INCON ASSESS: CPT | Performed by: NURSE PRACTITIONER

## 2022-08-18 PROCEDURE — G8420 CALC BMI NORM PARAMETERS: HCPCS | Performed by: NURSE PRACTITIONER

## 2022-08-18 PROCEDURE — G0463 HOSPITAL OUTPT CLINIC VISIT: HCPCS | Performed by: NURSE PRACTITIONER

## 2022-08-18 RX ORDER — MUPIROCIN 20 MG/G
OINTMENT TOPICAL DAILY
Qty: 22 G | Refills: 0 | Status: SHIPPED | OUTPATIENT
Start: 2022-08-18 | End: 2022-10-18 | Stop reason: ALTCHOICE

## 2022-08-18 RX ORDER — AMOXICILLIN AND CLAVULANATE POTASSIUM 875; 125 MG/1; MG/1
1 TABLET, FILM COATED ORAL EVERY 12 HOURS
Qty: 14 TABLET | Refills: 0 | Status: SHIPPED | OUTPATIENT
Start: 2022-08-18 | End: 2022-09-12 | Stop reason: ALTCHOICE

## 2022-08-18 RX ORDER — CEFTRIAXONE 1 G/1
1 INJECTION, POWDER, FOR SOLUTION INTRAMUSCULAR; INTRAVENOUS EVERY 24 HOURS
Status: DISCONTINUED | OUTPATIENT
Start: 2022-08-18 | End: 2022-09-12

## 2022-08-18 RX ORDER — AMLODIPINE BESYLATE 5 MG/1
TABLET ORAL
Qty: 30 TABLET | Refills: 1 | Status: SHIPPED | OUTPATIENT
Start: 2022-08-18 | End: 2022-09-09

## 2022-08-18 RX ADMIN — CEFTRIAXONE 1 G: 1 INJECTION, POWDER, FOR SOLUTION INTRAMUSCULAR; INTRAVENOUS at 14:47

## 2022-08-18 NOTE — PROGRESS NOTES
Patient present for routine immunizations. Tdap and Rocephin  Pt denies any symptoms , reactions or allergies that would exclude them from being immunized today. Risks and adverse reactions were discussed and the VIS was given to them. All questions were addressed. Pt was observed for 10 min post injection. There were no reactions observed.     Monserrat Gum

## 2022-08-18 NOTE — PROGRESS NOTES
Kt Humphreys (: 1940) is a [de-identified] y.o. female, established patient, here for evaluation of the following chief complaint(s):  Leg Pain (Car door hit right leg)       ASSESSMENT/PLAN:  Below is the assessment and plan developed based on review of pertinent history, physical exam, labs, studies, and medications. 1. Infected skin tear --wound to right anterior lower leg cleansed and bandage reapplied.  -     amoxicillin-clavulanate (AUGMENTIN) 875-125 mg per tablet; Take 1 Tablet by mouth every twelve (12) hours. , Normal, Disp-14 Tablet, R-0  -     mupirocin (BACTROBAN) 2 % ointment; Apply  to affected area daily. , Normal, Disp-22 g, R-0  -     cefTRIAXone (ROCEPHIN) injection 1 g; 1 g (0.0198 g/kg), IntraMUSCular, EVERY 24 HOURS, First dose on Thu 22 at 1500, Until Discontinued    2. Cellulitis of right lower extremity --Rocephin 1 g IM given in office and patient advised to start Augmentin p.o. tomorrow a.m.; follow-up in office tomorrow afternoon. -     amoxicillin-clavulanate (AUGMENTIN) 875-125 mg per tablet; Take 1 Tablet by mouth every twelve (12) hours. , Normal, Disp-14 Tablet, R-0  -     mupirocin (BACTROBAN) 2 % ointment; Apply  to affected area daily. , Normal, Disp-22 g, R-0  -     cefTRIAXone (ROCEPHIN) injection 1 g; 1 g (0.0198 g/kg), IntraMUSCular, EVERY 24 HOURS, First dose on Thu 22 at 1500, Until Discontinued    3. Encounter for immunization  -     TDAP, BOOSTRIX, (AGE 10 YRS+), IM  -     NC IMMUNIZ ADMIN,1 SINGLE/COMB VAC/TOXOID    4. Paroxysmal atrial fibrillation --remains on Eliquis; the high risk of bleeding    Follow-up in office tomorrow afternoon for recheck. SUBJECTIVE/OBJECTIVE:  HPI    Patient of Dr. Brayan Fang presents with complaints of skin tear to right anterior shin after she accidentally caught her leg against the lower edge of a car door on . Reports she had significant bleeding which eventually subsided with applied pressure.   Has been cleaning wound and applying Neosporin ointment along with gauze bandage. Has since developed redness around wound and more pain in right lower leg. Denies fever, chills but has felt fatigued. Last Tdap 1/18/2012. History of atrial fibrillation and DVT; currently on Eliquis for long-term anticoagulation. 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    Paroxysmal atrial fibrillation ([facility] [address] I48.0    Dyspnea on exertion R06.00    Kidney lesion N28.9    Atrial fibrillation with RVR ([facility] [address] I48.91     Past Surgical History:   Procedure Laterality Date    CARDIOVERSION EXTERNAL  9/18/2013         ENDOSCOPY, COLON, DIAGNOSTIC      8/09 neg    HX BREAST LUMPECTOMY Right     HX COLONOSCOPY      HX ENDOSCOPY      HX GI      colon resection    HX GI      hemorrhoidectomy    HX ORTHOPAEDIC      Mortons neuroma left foot    HX PELVIC LAPAROSCOPY      ovarian cystectomy    HX TONSILLECTOMY       Social History     Socioeconomic History    Marital status:      Spouse name: Not on file    Number of children: Not on file    Years of education: Not on file    Highest education level: Not on file   Occupational History    Not on file   Tobacco Use    Smoking status: Never    Smokeless tobacco: Never   Vaping Use    Vaping Use: Never used   Substance and Sexual Activity    Alcohol use:  Yes     Alcohol/week: 1.7 standard drinks     Types: 2 Glasses of wine per week     Comment: occasional    Drug use: No    Sexual activity: Not Currently   Other Topics Concern    Not on file   Social History Narrative    Not on file     Social Determinants of Health     Financial Resource Strain: Not on file   Food Insecurity: Not on file   Transportation Needs: Not on file   Physical Activity: Not on file   Stress: Not on file   Social Connections: Not on file   Intimate Partner Violence: Not on file   Housing Stability: Not on file     Family History   Problem Relation Age of Onset    Stroke Father     Stroke Brother      Current Outpatient Medications   Medication Sig    amoxicillin-clavulanate (AUGMENTIN) 875-125 mg per tablet Take 1 Tablet by mouth every twelve (12) hours. mupirocin (BACTROBAN) 2 % ointment Apply  to affected area daily. ondansetron (ZOFRAN ODT) 4 mg disintegrating tablet Take 1 Tablet by mouth every eight (8) hours as needed for Nausea or Vomiting. carvediloL (COREG) 3.125 mg tablet TAKE 2 TABLETS BY MOUTH TWICE A DAY WITH MEALS (Patient taking differently: Take 12.5 mg by mouth two (2) times daily (with meals). )    valsartan (DIOVAN) 320 mg tablet TAKE 1 TABLET BY MOUTH DAILY    amiodarone HCl (AMIODARONE PO) Take  by mouth daily. apixaban (Eliquis) 2.5 mg tablet Take 1 Tablet by mouth two (2) times a day. atorvastatin (LIPITOR) 10 mg tablet TAKE 1/2 TABLET BY MOUTH EVERY DAY    acetaminophen (TYLENOL) 500 mg tablet Take  by mouth as needed for Pain. amLODIPine (NORVASC) 5 mg tablet TAKE 1 TABLET BY MOUTH EVERY DAY IN THE MORNING    cetirizine (ZYRTEC) 10 mg tablet Take 1 Tablet by mouth daily as needed for Allergies. (Patient not taking: Reported on 8/18/2022)    hydrocortisone (HYTONE) 2.5 % topical cream APPLY TO AFFECTED AREA TWICE A DAY (Patient not taking: No sig reported)    fluticasone propionate (FLONASE) 50 mcg/actuation nasal spray 2 Sprays by Both Nostrils route daily.  (Patient not taking: Reported on 8/18/2022)     Current Facility-Administered Medications   Medication Dose Route Frequency    cefTRIAXone (ROCEPHIN) injection 1 g  1 g IntraMUSCular Q24H     Allergies   Allergen Reactions    Ciprofloxacin Hives    Doxycycline Nausea Only     Immunization History   Administered Date(s) Administered     Influenza, FLUZONE (age 72 y+), High Dose 09/02/2020    (RETIRED) Pneumococcal Vaccine (Unspecified Type) 10/18/2006    COVID-19, PFIZER PURPLE top, DILUTE for use, (age 15 y+), IM, 30mcg/0.3mL 01/22/2021, 02/11/2021, 09/27/2021, 05/11/2022    Influenza High Dose Vaccine PF 10/04/2017, 10/03/2018, 10/05/2019, 09/27/2021    Influenza Vaccine 12/09/2013, 01/05/2015, 10/15/2015    Influenza Vaccine (Tri) Adjuvanted (>65 Yrs FLUAD TRI 00724) 10/03/2019    Influenza Vaccine PF 12/29/2015    Influenza Vaccine Split 10/20/2011    Pneumococcal Conjugate (PCV-13) 10/04/2017, 04/03/2019    Pneumococcal Vaccine (Unspecified Type) 10/18/2006    TDAP Vaccine 01/18/2012    Tdap 08/18/2022    Zoster 07/01/2007    Zoster Vaccine, Live 07/01/2007          Review of Systems   Constitutional:  Positive for fatigue. Negative for chills and fever. Respiratory:  Negative for cough and shortness of breath. Cardiovascular:  Negative for chest pain. Gastrointestinal:  Negative for abdominal pain. Musculoskeletal:  Positive for joint swelling. Skin:  Positive for color change and wound. Neurological:  Negative for headaches. Psychiatric/Behavioral:  Negative for dysphoric mood. The patient is not nervous/anxious. BP (!) 162/76 (BP 1 Location: Left upper arm, BP Patient Position: Sitting, BP Cuff Size: Small adult)   Pulse (!) 58   Temp 97.7 °F (36.5 °C) (Oral)   Resp 14   Ht 5' 5\" (1.651 m)   Wt 111 lb 6.4 oz (50.5 kg)   LMP  (LMP Unknown)   SpO2 97%   BMI 18.54 kg/m²    Physical Exam  Vitals and nursing note reviewed. Constitutional:       General: She is not in acute distress. Appearance: Normal appearance. HENT:      Head: Normocephalic and atraumatic.    Pulmonary:      Effort: Pulmonary effort is normal.   Skin:     Findings: Wound present. Comments: Skin tear measuring 4 cm x 3 cm with erythema, edema, tenderness to surrounding skin measuring 17 cm long by 7 cm wide. Area marked. Neurological:      General: No focal deficit present. Mental Status: She is alert and oriented to person, place, and time. Psychiatric:         Mood and Affect: Mood normal.         Behavior: Behavior normal.             An electronic signature was used to authenticate this note.   -- Rudy Peterson NP

## 2022-08-19 ENCOUNTER — OFFICE VISIT (OUTPATIENT)
Dept: INTERNAL MEDICINE CLINIC | Age: 82
End: 2022-08-19
Payer: MEDICARE

## 2022-08-19 VITALS
DIASTOLIC BLOOD PRESSURE: 77 MMHG | RESPIRATION RATE: 15 BRPM | TEMPERATURE: 98.2 F | HEART RATE: 78 BPM | HEIGHT: 65 IN | WEIGHT: 111 LBS | SYSTOLIC BLOOD PRESSURE: 137 MMHG | BODY MASS INDEX: 18.49 KG/M2 | OXYGEN SATURATION: 96 %

## 2022-08-19 DIAGNOSIS — S81.811D NONINFECTED SKIN TEAR OF LEG, RIGHT, SUBSEQUENT ENCOUNTER: Primary | ICD-10-CM

## 2022-08-19 DIAGNOSIS — L03.115 CELLULITIS OF RIGHT LOWER EXTREMITY: ICD-10-CM

## 2022-08-19 PROCEDURE — G8432 DEP SCR NOT DOC, RNG: HCPCS | Performed by: NURSE PRACTITIONER

## 2022-08-19 PROCEDURE — 1101F PT FALLS ASSESS-DOCD LE1/YR: CPT | Performed by: NURSE PRACTITIONER

## 2022-08-19 PROCEDURE — G8752 SYS BP LESS 140: HCPCS | Performed by: NURSE PRACTITIONER

## 2022-08-19 PROCEDURE — 1090F PRES/ABSN URINE INCON ASSESS: CPT | Performed by: NURSE PRACTITIONER

## 2022-08-19 PROCEDURE — G8419 CALC BMI OUT NRM PARAM NOF/U: HCPCS | Performed by: NURSE PRACTITIONER

## 2022-08-19 PROCEDURE — G0463 HOSPITAL OUTPT CLINIC VISIT: HCPCS | Performed by: NURSE PRACTITIONER

## 2022-08-19 PROCEDURE — 99213 OFFICE O/P EST LOW 20 MIN: CPT | Performed by: NURSE PRACTITIONER

## 2022-08-19 PROCEDURE — G8427 DOCREV CUR MEDS BY ELIG CLIN: HCPCS | Performed by: NURSE PRACTITIONER

## 2022-08-19 PROCEDURE — G8536 NO DOC ELDER MAL SCRN: HCPCS | Performed by: NURSE PRACTITIONER

## 2022-08-19 PROCEDURE — G8754 DIAS BP LESS 90: HCPCS | Performed by: NURSE PRACTITIONER

## 2022-08-19 NOTE — PROGRESS NOTES
Kt Humphreys (: 1940) is a [de-identified] y.o. female, established patient, here for evaluation of the following chief complaint(s):  Leg Pain (Rt leg pain and infection follow up)       ASSESSMENT/PLAN:  Below is the assessment and plan developed based on review of pertinent history, physical exam, labs, studies, and medications. 1. Noninfected skin tear of leg, right, subsequent encounter -- wound appears clean; bandage changed; advised to change bandage on a daily basis. 2. Cellulitis of right lower extremity -- has improved since yesterday; had Rocephin 1 gm IM yesterday and is currently on Augmentin 875 mg twice daily      SUBJECTIVE/OBJECTIVE:  HPI    Presents for recheck of right lower leg wound. Received Rocephin 1 gm IM in office yesterday and has started on Augmentin this morning. Denies fever, chills.     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    Paroxysmal atrial fibrillation ([facility]) I48.0    Dyspnea on exertion R06.00    Kidney lesion N28.9    Atrial fibrillation with RVR ([facility]) I48.91     Past Surgical History:   Procedure Laterality Date    CARDIOVERSION EXTERNAL  2013         ENDOSCOPY, COLON, DIAGNOSTIC       neg    HX BREAST LUMPECTOMY Right     HX COLONOSCOPY      HX ENDOSCOPY      HX GI      colon resection    HX GI      hemorrhoidectomy    HX ORTHOPAEDIC      Mortons neuroma left foot    HX PELVIC LAPAROSCOPY      ovarian cystectomy    HX TONSILLECTOMY       Social History Socioeconomic History    Marital status:      Spouse name: Not on file    Number of children: Not on file    Years of education: Not on file    Highest education level: Not on file   Occupational History    Not on file   Tobacco Use    Smoking status: Never    Smokeless tobacco: Never   Vaping Use    Vaping Use: Never used   Substance and Sexual Activity    Alcohol use: Yes     Alcohol/week: 1.7 standard drinks     Types: 2 Glasses of wine per week     Comment: occasional    Drug use: No    Sexual activity: Not Currently   Other Topics Concern    Not on file   Social History Narrative    Not on file     Social Determinants of Health     Financial Resource Strain: Not on file   Food Insecurity: Not on file   Transportation Needs: Not on file   Physical Activity: Not on file   Stress: Not on file   Social Connections: Not on file   Intimate Partner Violence: Not on file   Housing Stability: Not on file     Family History   Problem Relation Age of Onset    Stroke Father     Stroke Brother      Current Outpatient Medications   Medication Sig    amLODIPine (NORVASC) 5 mg tablet TAKE 1 TABLET BY MOUTH EVERY DAY IN THE MORNING    amoxicillin-clavulanate (AUGMENTIN) 875-125 mg per tablet Take 1 Tablet by mouth every twelve (12) hours.  mupirocin (BACTROBAN) 2 % ointment Apply  to affected area daily.  ondansetron (ZOFRAN ODT) 4 mg disintegrating tablet Take 1 Tablet by mouth every eight (8) hours as needed for Nausea or Vomiting.  carvediloL (COREG) 3.125 mg tablet TAKE 2 TABLETS BY MOUTH TWICE A DAY WITH MEALS (Patient taking differently: Take 12.5 mg by mouth two (2) times daily (with meals). )    valsartan (DIOVAN) 320 mg tablet TAKE 1 TABLET BY MOUTH DAILY    amiodarone HCl (AMIODARONE PO) Take  by mouth daily.  apixaban (Eliquis) 2.5 mg tablet Take 1 Tablet by mouth two (2) times a day.     atorvastatin (LIPITOR) 10 mg tablet TAKE 1/2 TABLET BY MOUTH EVERY DAY    cetirizine (ZYRTEC) 10 mg tablet Take 1 Tablet by mouth daily as needed for Allergies.  hydrocortisone (HYTONE) 2.5 % topical cream APPLY TO AFFECTED AREA TWICE A DAY    fluticasone propionate (FLONASE) 50 mcg/actuation nasal spray 2 Sprays by Both Nostrils route daily.  acetaminophen (TYLENOL) 500 mg tablet Take  by mouth as needed for Pain. Current Facility-Administered Medications   Medication Dose Route Frequency    cefTRIAXone (ROCEPHIN) injection 1 g  1 g IntraMUSCular Q24H     Allergies   Allergen Reactions    Ciprofloxacin Hives    Doxycycline Nausea Only     Immunization History   Administered Date(s) Administered     Influenza, FLUZONE (age 72 y+), High Dose 09/02/2020    (RETIRED) Pneumococcal Vaccine (Unspecified Type) 10/18/2006    COVID-19, PFIZER PURPLE top, DILUTE for use, (age 15 y+), IM, 30mcg/0.3mL 01/22/2021, 02/11/2021, 09/27/2021, 05/11/2022    Influenza High Dose Vaccine PF 10/04/2017, 10/03/2018, 10/05/2019, 09/27/2021    Influenza Vaccine 12/09/2013, 01/05/2015, 10/15/2015    Influenza Vaccine (Tri) Adjuvanted (>65 Yrs FLUAD TRI 53952) 10/03/2019    Influenza Vaccine PF 12/29/2015    Influenza Vaccine Split 10/20/2011    Pneumococcal Conjugate (PCV-13) 10/04/2017, 04/03/2019    Pneumococcal Vaccine (Unspecified Type) 10/18/2006    TDAP Vaccine 01/18/2012    Tdap 08/18/2022    Zoster 07/01/2007    Zoster Vaccine, Live 07/01/2007        Review of Systems   Constitutional:  Negative for chills and fever. Skin:  Positive for wound. Physical Exam  Vitals and nursing note reviewed. Constitutional:       Appearance: Normal appearance. Pulmonary:      Effort: Pulmonary effort is normal.   Skin:     Findings: Lesion present. Comments: Area of cellulitis right anterior lower leg has decreased in size; wound cleansed and new bandage applied. Neurological:      General: No focal deficit present.       Mental Status: She is alert and oriented to person, place, and time.   Psychiatric:         Mood and Affect: Mood normal.         Behavior: Behavior normal.           An electronic signature was used to authenticate this note.   -- Deyvi Adan NP

## 2022-08-19 NOTE — PROGRESS NOTES
Room 10     Identified pt with two pt identifiers(name and ). Reviewed record in preparation for visit and have obtained necessary documentation. All patient medications has been reviewed. Chief Complaint   Patient presents with    Leg Pain     Rt leg pain and infection follow up       3 most recent PHQ Screens 2022   Little interest or pleasure in doing things Not at all   Feeling down, depressed, irritable, or hopeless Not at all   Total Score PHQ 2 0     Abuse Screening Questionnaire 2019   Do you ever feel afraid of your partner? N   Are you in a relationship with someone who physically or mentally threatens you? N   Is it safe for you to go home? Y       Health Maintenance Due   Topic    Shingrix Vaccine Age 49> (1 of 2)     Health Maintenance Review: Patient reminded of \"due or due soon\" health maintenance. I have asked the patient to contact his/her primary care provider (PCP) for follow-up on his/her health maintenance. Vitals:    22 1457   BP: 137/77   Pulse: 78   Resp: 15   Temp: 98.2 °F (36.8 °C)   TempSrc: Oral   SpO2: 96%   Weight: 111 lb (50.3 kg)   Height: 5' 5\" (1.651 m)   PainSc:   0 - No pain       Wt Readings from Last 3 Encounters:   22 111 lb (50.3 kg)   22 111 lb 6.4 oz (50.5 kg)   22 111 lb (50.3 kg)     Temp Readings from Last 3 Encounters:   22 98.2 °F (36.8 °C) (Oral)   22 97.7 °F (36.5 °C) (Oral)   22 97.5 °F (36.4 °C) (Oral)     BP Readings from Last 3 Encounters:   22 137/77   22 (!) 162/76   22 (!) 190/75     Pulse Readings from Last 3 Encounters:   22 78   22 (!) 58   22 (!) 53       1. \"Have you been to the ER, urgent care clinic since your last visit? Hospitalized since your last visit? \" No    2. \"Have you seen or consulted any other health care providers outside of the [address] [place] [address] since your last visit? \" No     3.  For patients aged 39-70: Has the patient had a colonoscopy / FIT/ Cologuard? NA - based on age      If the patient is female:    4. For patients aged 41-77: Has the patient had a mammogram within the past 2 years? No      5. For patients aged 21-65: Has the patient had a pap smear?  NA - based on age or sex

## 2022-08-23 ENCOUNTER — OFFICE VISIT (OUTPATIENT)
Dept: INTERNAL MEDICINE CLINIC | Age: 82
End: 2022-08-23
Payer: MEDICARE

## 2022-08-23 VITALS
WEIGHT: 112.4 LBS | HEIGHT: 65 IN | OXYGEN SATURATION: 95 % | SYSTOLIC BLOOD PRESSURE: 147 MMHG | DIASTOLIC BLOOD PRESSURE: 72 MMHG | BODY MASS INDEX: 18.73 KG/M2 | RESPIRATION RATE: 16 BRPM | TEMPERATURE: 97.6 F | HEART RATE: 95 BPM

## 2022-08-23 DIAGNOSIS — L08.9 INFECTED SKIN TEAR: Primary | ICD-10-CM

## 2022-08-23 DIAGNOSIS — T14.8XXA INFECTED SKIN TEAR: Primary | ICD-10-CM

## 2022-08-23 DIAGNOSIS — L03.115 CELLULITIS OF RIGHT LOWER EXTREMITY: ICD-10-CM

## 2022-08-23 PROCEDURE — G8427 DOCREV CUR MEDS BY ELIG CLIN: HCPCS | Performed by: NURSE PRACTITIONER

## 2022-08-23 PROCEDURE — G0463 HOSPITAL OUTPT CLINIC VISIT: HCPCS | Performed by: NURSE PRACTITIONER

## 2022-08-23 PROCEDURE — G8754 DIAS BP LESS 90: HCPCS | Performed by: NURSE PRACTITIONER

## 2022-08-23 PROCEDURE — 99213 OFFICE O/P EST LOW 20 MIN: CPT | Performed by: NURSE PRACTITIONER

## 2022-08-23 PROCEDURE — 1090F PRES/ABSN URINE INCON ASSESS: CPT | Performed by: NURSE PRACTITIONER

## 2022-08-23 PROCEDURE — G8420 CALC BMI NORM PARAMETERS: HCPCS | Performed by: NURSE PRACTITIONER

## 2022-08-23 PROCEDURE — G8432 DEP SCR NOT DOC, RNG: HCPCS | Performed by: NURSE PRACTITIONER

## 2022-08-23 PROCEDURE — G8536 NO DOC ELDER MAL SCRN: HCPCS | Performed by: NURSE PRACTITIONER

## 2022-08-23 PROCEDURE — G8753 SYS BP > OR = 140: HCPCS | Performed by: NURSE PRACTITIONER

## 2022-08-23 PROCEDURE — 1101F PT FALLS ASSESS-DOCD LE1/YR: CPT | Performed by: NURSE PRACTITIONER

## 2022-08-23 RX ORDER — CEFUROXIME AXETIL 500 MG/1
500 TABLET ORAL 2 TIMES DAILY
Qty: 14 TABLET | Refills: 0 | Status: SHIPPED | OUTPATIENT
Start: 2022-08-23 | End: 2022-09-12 | Stop reason: ALTCHOICE

## 2022-08-23 RX ORDER — METHOCARBAMOL 500 MG/1
500 TABLET, FILM COATED ORAL
Qty: 30 TABLET | Refills: 0 | Status: CANCELLED | OUTPATIENT
Start: 2022-08-23

## 2022-08-23 NOTE — PROGRESS NOTES
Kt Humphreys (: 1940) is a [de-identified] y.o. female, established patient, here for evaluation of the following chief complaint(s):  Skin Infection (Follow up )       ASSESSMENT/PLAN:  Below is the assessment and plan developed based on review of pertinent history, physical exam, labs, studies, and medications. 1. Infected skin tear -- will enlist assistance of Wound Clinic; given information regarding wound clinic by [facility] that accepts walk-in appointments. -     cefUROXime (CEFTIN) 500 mg tablet; Take 1 Tablet by mouth two (2) times a day., Normal, Disp-14 Tablet, R-0  -     REFERRAL TO WOUND CARE    2. Cellulitis of right lower extremity -- cellulitis appears to be worsening again; will discontinue Augmentin and start Ceftin; she has been intolerant of Doxycycline in the past.  -     cefUROXime (CEFTIN) 500 mg tablet; Take 1 Tablet by mouth two (2) times a day., Normal, Disp-14 Tablet, R-0  -     REFERRAL TO WOUND CARE          SUBJECTIVE/OBJECTIVE:  HPI    Returns to office for follow-up wound check. Continues on Augmentin 875 twice daily but has noticed a return of redness to right anterior lower leg. Has been changing bandages on a daily basis and keeping wound clean. Denies fever, chills, myalgias.     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    Paroxysmal atrial fibrillation ([facility] [address] I48.0    Dyspnea on exertion R06.00    Kidney lesion N28.9    Atrial fibrillation with RVR ([facility] [address] I48.91     Past Surgical History: Procedure Laterality Date    CARDIOVERSION EXTERNAL  9/18/2013         ENDOSCOPY, COLON, DIAGNOSTIC      8/09 neg    HX BREAST LUMPECTOMY Right     HX COLONOSCOPY      HX ENDOSCOPY      HX GI      colon resection    HX GI      hemorrhoidectomy    HX ORTHOPAEDIC      Mortons neuroma left foot    HX PELVIC LAPAROSCOPY      ovarian cystectomy    HX TONSILLECTOMY       Social History     Socioeconomic History    Marital status:      Spouse name: Not on file    Number of children: Not on file    Years of education: Not on file    Highest education level: Not on file   Occupational History    Not on file   Tobacco Use    Smoking status: Never    Smokeless tobacco: Never   Vaping Use    Vaping Use: Never used   Substance and Sexual Activity    Alcohol use: Yes     Alcohol/week: 1.7 standard drinks     Types: 2 Glasses of wine per week     Comment: occasional    Drug use: No    Sexual activity: Not Currently   Other Topics Concern    Not on file   Social History Narrative    Not on file     Social Determinants of Health     Financial Resource Strain: Not on file   Food Insecurity: Not on file   Transportation Needs: Not on file   Physical Activity: Not on file   Stress: Not on file   Social Connections: Not on file   Intimate Partner Violence: Not on file   Housing Stability: Not on file     Family History   Problem Relation Age of Onset    Stroke Father     Stroke Brother      Current Outpatient Medications   Medication Sig    cefUROXime (CEFTIN) 500 mg tablet Take 1 Tablet by mouth two (2) times a day. amLODIPine (NORVASC) 5 mg tablet TAKE 1 TABLET BY MOUTH EVERY DAY IN THE MORNING    amoxicillin-clavulanate (AUGMENTIN) 875-125 mg per tablet Take 1 Tablet by mouth every twelve (12) hours. mupirocin (BACTROBAN) 2 % ointment Apply  to affected area daily.     carvediloL (COREG) 3.125 mg tablet TAKE 2 TABLETS BY MOUTH TWICE A DAY WITH MEALS (Patient taking differently: Take 12.5 mg by mouth two (2) times daily (with meals). )    valsartan (DIOVAN) 320 mg tablet TAKE 1 TABLET BY MOUTH DAILY    amiodarone HCl (AMIODARONE PO) Take  by mouth daily. apixaban (Eliquis) 2.5 mg tablet Take 1 Tablet by mouth two (2) times a day. atorvastatin (LIPITOR) 10 mg tablet TAKE 1/2 TABLET BY MOUTH EVERY DAY    cetirizine (ZYRTEC) 10 mg tablet Take 1 Tablet by mouth daily as needed for Allergies. fluticasone propionate (FLONASE) 50 mcg/actuation nasal spray 2 Sprays by Both Nostrils route daily. acetaminophen (TYLENOL) 500 mg tablet Take  by mouth as needed for Pain. Current Facility-Administered Medications   Medication Dose Route Frequency    cefTRIAXone (ROCEPHIN) injection 1 g  1 g IntraMUSCular Q24H     Allergies   Allergen Reactions    Ciprofloxacin Hives    Doxycycline Nausea Only     Immunization History   Administered Date(s) Administered     Influenza, FLUZONE (age 72 y+), High Dose 09/02/2020    (RETIRED) Pneumococcal Vaccine (Unspecified Type) 10/18/2006    COVID-19, PFIZER PURPLE top, DILUTE for use, (age 15 y+), IM, 30mcg/0.3mL 01/22/2021, 02/11/2021, 09/27/2021, 05/11/2022    Influenza High Dose Vaccine PF 10/04/2017, 10/03/2018, 10/05/2019, 09/27/2021    Influenza Vaccine 12/09/2013, 01/05/2015, 10/15/2015    Influenza Vaccine (Tri) Adjuvanted (>65 Yrs FLUAD TRI 96411) 10/03/2019    Influenza Vaccine PF 12/29/2015    Influenza Vaccine Split 10/20/2011    Pneumococcal Conjugate (PCV-13) 10/04/2017, 04/03/2019    Pneumococcal Vaccine (Unspecified Type) 10/18/2006    TDAP Vaccine 01/18/2012    Tdap 08/18/2022    Zoster 07/01/2007    Zoster Vaccine, Live 07/01/2007        Review of Systems   Constitutional:  Negative for chills and fever. Musculoskeletal:  Positive for joint swelling. Skin:  Positive for wound.      Visit Vitals  BP (!) 147/72 (BP 1 Location: Left upper arm, BP Patient Position: Sitting, BP Cuff Size: Small adult) Comment: pt forgot to take her 2nd BP medication this AM   Pulse 95   Temp 97.6 °F (36.4 °C)   Resp 16   Ht 5' 5\" (1.651 m)   Wt 112 lb 6.4 oz (51 kg)   LMP  (LMP Unknown)   SpO2 95%   BMI 18.70 kg/m²      Physical Exam  Vitals and nursing note reviewed. Constitutional:       Appearance: Normal appearance. HENT:      Head: Normocephalic and atraumatic. Musculoskeletal:        Legs:       Comments: Area of erythema, warmth to right anterior shin has increased. Neurological:      General: No focal deficit present. Mental Status: She is alert and oriented to person, place, and time. Psychiatric:         Mood and Affect: Mood normal.         Behavior: Behavior normal.             An electronic signature was used to authenticate this note.   -- Sanchez Roberts NP

## 2022-08-24 ENCOUNTER — PATIENT OUTREACH (OUTPATIENT)
Dept: CASE MANAGEMENT | Age: 82
End: 2022-08-24

## 2022-08-24 NOTE — PROGRESS NOTES
8/24/2022  12:52 PM    Ambulatory Care Management Note    Date/Time:  8/24/2022 12:53 PM    This patient was received as a referral from 1 [facility]. Ambulatory Care Manager outreached to patient today to offer care management services. Introduction to self and role of care manager provided. Patient accepted care management services at this time. Follow up call scheduled at this time. Patient has Ambulatory Care Manager's contact number for for any questions or concerns.

## 2022-09-09 DIAGNOSIS — I10 HTN, GOAL BELOW 130/80: ICD-10-CM

## 2022-09-09 RX ORDER — AMLODIPINE BESYLATE 5 MG/1
TABLET ORAL
Qty: 30 TABLET | Refills: 1 | Status: SHIPPED | OUTPATIENT
Start: 2022-09-09 | End: 2022-10-03

## 2022-09-12 ENCOUNTER — OFFICE VISIT (OUTPATIENT)
Dept: INTERNAL MEDICINE CLINIC | Age: 82
End: 2022-09-12
Attending: INTERNAL MEDICINE
Payer: MEDICARE

## 2022-09-12 ENCOUNTER — HOSPITAL ENCOUNTER (OUTPATIENT)
Dept: ULTRASOUND IMAGING | Age: 82
Discharge: HOME | End: 2022-09-12
Attending: INTERNAL MEDICINE
Payer: MEDICARE

## 2022-09-12 VITALS
WEIGHT: 112 LBS | HEIGHT: 65 IN | HEART RATE: 54 BPM | SYSTOLIC BLOOD PRESSURE: 155 MMHG | TEMPERATURE: 97.5 F | RESPIRATION RATE: 16 BRPM | DIASTOLIC BLOOD PRESSURE: 69 MMHG | OXYGEN SATURATION: 99 % | BODY MASS INDEX: 18.66 KG/M2

## 2022-09-12 DIAGNOSIS — L08.9 INFECTED SKIN TEAR: ICD-10-CM

## 2022-09-12 DIAGNOSIS — A31.9 ATYPICAL MYCOBACTERIAL DISEASE: ICD-10-CM

## 2022-09-12 DIAGNOSIS — K59.09 OTHER CONSTIPATION: ICD-10-CM

## 2022-09-12 DIAGNOSIS — I10 HTN, GOAL BELOW 130/80: ICD-10-CM

## 2022-09-12 DIAGNOSIS — R06.02 SOB (SHORTNESS OF BREATH): ICD-10-CM

## 2022-09-12 DIAGNOSIS — M79.89 RIGHT LEG SWELLING: ICD-10-CM

## 2022-09-12 DIAGNOSIS — T14.8XXA INFECTED SKIN TEAR: ICD-10-CM

## 2022-09-12 DIAGNOSIS — M79.89 RIGHT LEG SWELLING: Primary | ICD-10-CM

## 2022-09-12 PROCEDURE — G8432 DEP SCR NOT DOC, RNG: HCPCS | Performed by: INTERNAL MEDICINE

## 2022-09-12 PROCEDURE — 93971 EXTREMITY STUDY: CPT

## 2022-09-12 PROCEDURE — G8754 DIAS BP LESS 90: HCPCS | Performed by: INTERNAL MEDICINE

## 2022-09-12 PROCEDURE — G8753 SYS BP > OR = 140: HCPCS | Performed by: INTERNAL MEDICINE

## 2022-09-12 PROCEDURE — 99214 OFFICE O/P EST MOD 30 MIN: CPT | Performed by: INTERNAL MEDICINE

## 2022-09-12 PROCEDURE — 1101F PT FALLS ASSESS-DOCD LE1/YR: CPT | Performed by: INTERNAL MEDICINE

## 2022-09-12 PROCEDURE — G8427 DOCREV CUR MEDS BY ELIG CLIN: HCPCS | Performed by: INTERNAL MEDICINE

## 2022-09-12 PROCEDURE — G8536 NO DOC ELDER MAL SCRN: HCPCS | Performed by: INTERNAL MEDICINE

## 2022-09-12 PROCEDURE — 1090F PRES/ABSN URINE INCON ASSESS: CPT | Performed by: INTERNAL MEDICINE

## 2022-09-12 PROCEDURE — G8420 CALC BMI NORM PARAMETERS: HCPCS | Performed by: INTERNAL MEDICINE

## 2022-09-12 NOTE — PROGRESS NOTES
HISTORY OF PRESENT ILLNESS  Tata Humphreys is a 81 y.o. female. HPI  Fidel Moss has been going to the wound care clinic at [facility], had been going weekly, but now will be going once a month as the right anterior shin lesion has improved. She is done with her most recent round of antibiotic, Ceftin, and does feel that it is better. Does note edema in both feet, shortness of breath at night, which is longstanding. 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 Really cannot tell if her breathing has changed significantly. Sats in my office are 99% and weight is stable. No recent chest pain. Does remain on Amlodipine for her blood pressure, which may be contributing to the edema. Also on Valsartan and Coreg. Chronically on Eliquis, which she has not missed. Review of Systems   Constitutional:  Positive for malaise/fatigue. Negative for chills, fever and weight loss. Respiratory:  Positive for shortness of breath. Negative for cough and wheezing. Cardiovascular:  Positive for leg swelling. Negative for chest pain, palpitations, orthopnea and PND. Gastrointestinal:  Negative for heartburn and nausea. Musculoskeletal:  Negative for myalgias. Neurological:  Negative for dizziness and headaches. Physical Exam  Vitals and nursing note reviewed. Constitutional:       Appearance: She is well-developed. HENT:      Head: Normocephalic and atraumatic. Neck:      Thyroid: No thyromegaly. Vascular: No carotid bruit. Cardiovascular:      Rate and Rhythm: Normal rate and regular rhythm. Heart sounds: Normal heart sounds, S1 normal and S2 normal. No murmur heard. Pulmonary:      Effort: Pulmonary effort is normal. No respiratory distress. Breath sounds: Normal breath sounds. No wheezing or rales. Musculoskeletal:      Cervical back: Normal range of motion and neck supple. Right lower leg: Edema present. Left lower leg: Edema present. Comments: Lesion on right ant shin is closing with no drainage and superficial wound only present and no soft tissue infection around site   Neurological:      Mental Status: She is alert and oriented to person, place, and time. Psychiatric:         Behavior: Behavior normal.       ASSESSMENT and PLAN  Diagnoses and all orders for this visit:    1. Right leg swelling-really bilateral edema suspect from norvasc-discussed pros and cons of changing bp med if doppler is neg will not change  On eliquis so low level suspicion for clot but check doppler to be sure  No present sign of infection  -     DUPLEX LOWER EXT VENOUS RIGHT; Future    2. SOB (shortness of breath)-chronic with abn chest ct normal sats today  Cont with follow up at [facility]    3. HTN, goal below 130/80    4. Infected skin tear-improved cont monthly wound care    5. Other constipation-discussed prunes and pericolace qd    6.  Atypical mycobacterial disease

## 2022-09-15 NOTE — PROGRESS NOTES
Can you let her know  I am  happy to see the normal doppler no clots  Do  think her edema is from bp med but not dangerous try to wear compression hose to manage

## 2022-09-16 NOTE — PROGRESS NOTES
Detailed v/m left for patient notifying of normal doppler report. Advised compression hose for her swelling.

## 2022-10-03 DIAGNOSIS — I10 HTN, GOAL BELOW 130/80: ICD-10-CM

## 2022-10-03 RX ORDER — AMLODIPINE BESYLATE 5 MG/1
TABLET ORAL
Qty: 30 TABLET | Refills: 1 | Status: SHIPPED | OUTPATIENT
Start: 2022-10-03 | End: 2022-10-28

## 2022-10-06 DIAGNOSIS — E78.5 DYSLIPIDEMIA: ICD-10-CM

## 2022-10-06 RX ORDER — ATORVASTATIN CALCIUM 10 MG/1
TABLET, FILM COATED ORAL
Qty: 45 TABLET | Refills: 1 | Status: SHIPPED | OUTPATIENT
Start: 2022-10-06

## 2022-10-10 ENCOUNTER — PATIENT OUTREACH (OUTPATIENT)
Dept: CASE MANAGEMENT | Age: 82
End: 2022-10-10

## 2022-10-10 NOTE — LETTER
10/11/2022 1:43 PM    Ms. [place] [address] [address]  [address] [address]            My name is Joseluis Greenberg, and I am the RN Care Manager with [facility] that spoke with you over the phone. As I mentioned before, I partner with your primary care providers office to work with patients who may benefit from additional support understanding, managing, and improving their health. As a member of your care team, I will work with other providers involved in your care, offer education/assistance for your specific health conditions, and connect you with additional resources as needed. I have been unable to reach you via phone. If you would like additional assistance, please contact me at [phone] to discuss how I can support your health care needs.                  Sincerely,      Ricardo Aden

## 2022-10-10 NOTE — PROGRESS NOTES
10/10/2022  10:47 AM    Patient outreach attempt by this ACM today to perform CCM assessment. Attempts to reach patient were unsuccessful. Left voicemail requesting call back and ACM contact information.

## 2022-10-11 NOTE — PROGRESS NOTES
10/11/2022  1:43 PM    Patient outreach attempt by this ACM today to perform CCM assessment. Attempts to reach patient were unsuccessful. Left voicemail requesting call back and ACM contact information. Will send GIT letter and made final attempt in 2 weeks.      Becka Denson RN, BSN - Ambulatory Care Manager  [phone]

## 2022-10-18 ENCOUNTER — VIRTUAL VISIT (OUTPATIENT)
Dept: INTERNAL MEDICINE CLINIC | Age: 82
End: 2022-10-18
Payer: MEDICARE

## 2022-10-18 DIAGNOSIS — J01.40 ACUTE PANSINUSITIS, RECURRENCE NOT SPECIFIED: Primary | ICD-10-CM

## 2022-10-18 PROCEDURE — 99442 PR PHYS/QHP TELEPHONE EVALUATION 11-20 MIN: CPT | Performed by: NURSE PRACTITIONER

## 2022-10-18 RX ORDER — PREDNISONE 20 MG/1
20 TABLET ORAL
Qty: 5 TABLET | Refills: 0 | Status: SHIPPED | OUTPATIENT
Start: 2022-10-18

## 2022-10-18 RX ORDER — AMOXICILLIN AND CLAVULANATE POTASSIUM 875; 125 MG/1; MG/1
1 TABLET, FILM COATED ORAL 2 TIMES DAILY
Qty: 20 TABLET | Refills: 0 | Status: SHIPPED | OUTPATIENT
Start: 2022-10-18

## 2022-10-18 NOTE — PROGRESS NOTES
Jeyson Humphreys (: 1940) is a 81 y.o. female, established patient, here for evaluation of the following chief complaint(s):   Sinus Infection (Symptoms started 12 days, have a prescription for Amoxicillin but need confirmation to take it)       ASSESSMENT/PLAN:  Below is the assessment and plan developed based on review of pertinent history, labs, studies, and medications. 1. Acute pansinusitis, recurrence not specified --not improving with symptomatic treatment; has significant nasal congestion and will treat with Augmentin and short course of prednisone. -     amoxicillin-clavulanate (AUGMENTIN) 875-125 mg per tablet; Take 1 Tablet by mouth two (2) times a day., Normal, Disp-20 Tablet, R-0  -     predniSONE (DELTASONE) 20 mg tablet; Take 1 Tablet by mouth daily (with breakfast). , Normal, Disp-5 Tablet, R-0      Follow-up in office if symptoms not improving. SUBJECTIVE/OBJECTIVE:  HPI    Visit conducted via telephone only. Patient of Dr. Shikha Kam presents with complaints of nasal congestion, sinus pressure over her eyes and right cheek, fatigue, cough, low-grade fever that began about 10 to 12 days ago. Has been taking OTC Benadryl and Tylenol for symptoms without significant improvement. Able to blow out significant amount of dark yellow mucus from nose but has not been improving. Has been using Flonase nasal spray without significant improvement. Has tested negative for COVID several times since symptoms began. Denies shortness of breath, wheezing.     Patient Active Problem List   Diagnosis Code    [facility] C50.919    DVT of leg (deep venous thrombosis) (HCC) I82.409    Rectal polyp K62.1    Osteoporosis M81.0    Mixed hyperlipidemia E78.2    Allergic rhinitis J30.9    Lung nodule R91.1    DNR (do not resuscitate) Z66    Hypertension, essential, benign I10    PVC (premature ventricular contraction) I49.3    Fatigue R53.83    Hyperkalemia E87.5    Advanced care planning/counseling discussion Z71.89    Stroke risk Z91.89    Atypical mycobacterial disease A31.9    Paroxysmal atrial fibrillation (HCC) I48.0    Dyspnea on exertion R06.09    Kidney lesion N28.9    Atrial fibrillation with RVR (HCC) I48.91     Past Surgical History:   Procedure Laterality Date    CARDIOVERSION EXTERNAL  9/18/2013         ENDOSCOPY, COLON, DIAGNOSTIC      8/09 neg    HX BREAST LUMPECTOMY Right     HX COLONOSCOPY      HX ENDOSCOPY      HX GI      colon resection    HX GI      hemorrhoidectomy    HX ORTHOPAEDIC      Mortons neuroma left foot    HX PELVIC LAPAROSCOPY      ovarian cystectomy    HX TONSILLECTOMY       Social History     Socioeconomic History    Marital status:      Spouse name: Not on file    Number of children: Not on file    Years of education: Not on file    Highest education level: Not on file   Occupational History    Not on file   Tobacco Use    Smoking status: Never    Smokeless tobacco: Never   Vaping Use    Vaping Use: Never used   Substance and Sexual Activity    Alcohol use: Yes     Alcohol/week: 1.7 standard drinks     Types: 2 Glasses of wine per week     Comment: occasional    Drug use: No    Sexual activity: Not Currently   Other Topics Concern    Not on file   Social History Narrative    Not on file     Social Determinants of Health     Financial Resource Strain: Not on file   Food Insecurity: Not on file   Transportation Needs: Not on file   Physical Activity: Not on file   Stress: Not on file   Social Connections: Not on file   Intimate Partner Violence: Not on file   Housing Stability: Not on file     Family History   Problem Relation Age of Onset    Stroke Father     Stroke Brother      Current Outpatient Medications   Medication Sig    amoxicillin-clavulanate (AUGMENTIN) 875-125 mg per tablet Take 1 Tablet by mouth two (2) times a day. predniSONE (DELTASONE) 20 mg tablet Take 1 Tablet by mouth daily (with breakfast).     atorvastatin (LIPITOR) 10 mg tablet TAKE 1/2 TABLET BY MOUTH EVERY DAY    amLODIPine (NORVASC) 5 mg tablet TAKE 1 TABLET BY MOUTH EVERY DAY IN THE MORNING    carvediloL (COREG) 3.125 mg tablet TAKE 2 TABLETS BY MOUTH TWICE A DAY WITH MEALS (Patient taking differently: Take 12.5 mg by mouth two (2) times daily (with meals). )    valsartan (DIOVAN) 320 mg tablet TAKE 1 TABLET BY MOUTH DAILY    amiodarone HCl (AMIODARONE PO) Take  by mouth daily. apixaban (Eliquis) 2.5 mg tablet Take 1 Tablet by mouth two (2) times a day. fluticasone propionate (FLONASE) 50 mcg/actuation nasal spray 2 Sprays by Both Nostrils route daily. cetirizine (ZYRTEC) 10 mg tablet Take 1 Tablet by mouth daily as needed for Allergies. (Patient not taking: Reported on 10/18/2022)    acetaminophen (TYLENOL) 500 mg tablet Take  by mouth as needed for Pain. No current facility-administered medications for this visit. Allergies   Allergen Reactions    Ciprofloxacin Hives    Doxycycline Nausea Only     Immunization History   Administered Date(s) Administered     Influenza, FLUZONE (age 72 y+), High Dose 09/02/2020    (RETIRED) Pneumococcal Vaccine (Unspecified Type) 10/18/2006    COVID-19, PFIZER PURPLE top, DILUTE for use, (age 15 y+), IM, 30mcg/0.3mL 01/22/2021, 02/11/2021, 09/27/2021, 05/11/2022    Influenza High Dose Vaccine PF 10/04/2017, 10/03/2018, 10/05/2019, 09/27/2021    Influenza Vaccine 12/09/2013, 01/05/2015, 10/15/2015    Influenza Vaccine (Tri) Adjuvanted (>65 Yrs FLUAD TRI 17994) 10/03/2019    Influenza Vaccine PF 12/29/2015    Influenza Vaccine Split 10/20/2011    Pneumococcal Conjugate (PCV-13) 10/04/2017, 04/03/2019    Pneumococcal Vaccine (Unspecified Type) 10/18/2006    TDAP Vaccine 01/18/2012    Tdap 08/18/2022    Zoster 07/01/2007    Zoster Vaccine, Live 07/01/2007        Review of Systems   Constitutional:  Positive for fatigue and fever. Negative for chills. HENT:  Positive for congestion, postnasal drip, sinus pressure and sinus pain.     Respiratory:  Positive for cough. Negative for shortness of breath and wheezing. Cardiovascular:  Negative for chest pain. Neurological:  Positive for headaches. Negative for dizziness. Patient-Reported Weight: 110lb       Physical Exam    Unable to perform due to telephone nature of visit    On this date 10/18/2022 I have spent 15 minutes reviewing previous notes, test results and telephone call with the patient discussing the diagnosis and importance of compliance with the treatment plan as well as documenting on the day of the visit. I was in the office while conducting this encounter. Consent:  She and/or her healthcare decision maker is aware that this patient-initiated Telehealth encounter is a billable service, with coverage as determined by her insurance carrier. She is aware that she may receive a bill and has provided verbal consent to proceed: Yes    This virtual visit was conducted telephone encounter only. -  I affirm this is a Patient Initiated Episode with an Established Patient who has not had a related appointment within my department in the past 7 days or scheduled within the next 24 hours. Note: this encounter is not billable if this call serves to triage the patient into an appointment for the relevant concern. Total Time: minutes: 11-20 minutes. An electronic signature was used to authenticate this note.   -- Victor Manuel Yee NP

## 2022-10-28 DIAGNOSIS — I10 HTN, GOAL BELOW 130/80: ICD-10-CM

## 2022-10-28 RX ORDER — AMLODIPINE BESYLATE 5 MG/1
TABLET ORAL
Qty: 30 TABLET | Refills: 1 | Status: SHIPPED | OUTPATIENT
Start: 2022-10-28 | End: 2022-11-21

## 2022-11-03 ENCOUNTER — PATIENT OUTREACH (OUTPATIENT)
Dept: CASE MANAGEMENT | Age: 82
End: 2022-11-03

## 2022-11-03 NOTE — PROGRESS NOTES
Ambulatory Care Management Note      Patient has graduated from the Complex Case Management program on 11/3/2022. Multiple unsuccessful attempts have been made to contact patient. Ambulatory Care Management get in touch letter mailed to the patients address on file. No care management goals have been completed. No further Ambulatory Care Manager follow up scheduled. Patient has Ambulatory Care Manager's contact information for any further questions, concerns, or needs.     Patients upcoming visits:    Future Appointments   Date Time Provider Corky Mann   1/10/2023 11:40 AM Mecca Mas MD [address], RN, BSN - Ambulatory Care Manager  [phone]

## 2022-11-21 DIAGNOSIS — I10 HTN, GOAL BELOW 130/80: ICD-10-CM

## 2022-11-21 RX ORDER — AMLODIPINE BESYLATE 5 MG/1
TABLET ORAL
Qty: 30 TABLET | Refills: 1 | Status: SHIPPED | OUTPATIENT
Start: 2022-11-21

## 2022-11-30 ENCOUNTER — TELEPHONE (OUTPATIENT)
Dept: INTERNAL MEDICINE CLINIC | Age: 82
End: 2022-11-30

## 2022-12-01 NOTE — TELEPHONE ENCOUNTER
Incoming call from patient on the Nurse triage line c/o sob & flushing of her face. She states the sob is not a new problem & her breathing has not changed from her baseline. Her real issue is the flushing of her face & itchy hands. In the Summer her coreg dose was decreased & amlodipine was added. The flushing of her face has been going on for about a month & patient feels she is having a reaction to one of her medications. She denies chest pain & seeing an actual rash on her face. Advised appt in-office for eval. Advised she continue her medications until she is seen. Patient scheduled for Dec 5th. Patient was thankful for taking her call & scheduling an appt.

## 2022-12-05 ENCOUNTER — OFFICE VISIT (OUTPATIENT)
Dept: INTERNAL MEDICINE CLINIC | Age: 82
End: 2022-12-05
Payer: MEDICARE

## 2022-12-05 VITALS
BODY MASS INDEX: 17.84 KG/M2 | TEMPERATURE: 97.6 F | SYSTOLIC BLOOD PRESSURE: 152 MMHG | HEART RATE: 57 BPM | WEIGHT: 107.08 LBS | DIASTOLIC BLOOD PRESSURE: 74 MMHG | RESPIRATION RATE: 16 BRPM | HEIGHT: 65 IN | OXYGEN SATURATION: 98 %

## 2022-12-05 DIAGNOSIS — A31.9 ATYPICAL MYCOBACTERIAL DISEASE: ICD-10-CM

## 2022-12-05 DIAGNOSIS — I10 HTN, GOAL BELOW 130/80: Primary | ICD-10-CM

## 2022-12-05 DIAGNOSIS — R23.2 FLUSHING: ICD-10-CM

## 2022-12-05 DIAGNOSIS — R63.4 WEIGHT LOSS: ICD-10-CM

## 2022-12-05 DIAGNOSIS — R06.09 DYSPNEA ON EXERTION: ICD-10-CM

## 2022-12-05 PROCEDURE — 99214 OFFICE O/P EST MOD 30 MIN: CPT | Performed by: INTERNAL MEDICINE

## 2022-12-05 PROCEDURE — G8753 SYS BP > OR = 140: HCPCS | Performed by: INTERNAL MEDICINE

## 2022-12-05 PROCEDURE — G0463 HOSPITAL OUTPT CLINIC VISIT: HCPCS | Performed by: INTERNAL MEDICINE

## 2022-12-05 PROCEDURE — G8754 DIAS BP LESS 90: HCPCS | Performed by: INTERNAL MEDICINE

## 2022-12-05 PROCEDURE — 1090F PRES/ABSN URINE INCON ASSESS: CPT | Performed by: INTERNAL MEDICINE

## 2022-12-05 PROCEDURE — G8419 CALC BMI OUT NRM PARAM NOF/U: HCPCS | Performed by: INTERNAL MEDICINE

## 2022-12-05 PROCEDURE — G8432 DEP SCR NOT DOC, RNG: HCPCS | Performed by: INTERNAL MEDICINE

## 2022-12-05 PROCEDURE — G8536 NO DOC ELDER MAL SCRN: HCPCS | Performed by: INTERNAL MEDICINE

## 2022-12-05 PROCEDURE — G8427 DOCREV CUR MEDS BY ELIG CLIN: HCPCS | Performed by: INTERNAL MEDICINE

## 2022-12-05 PROCEDURE — 1101F PT FALLS ASSESS-DOCD LE1/YR: CPT | Performed by: INTERNAL MEDICINE

## 2022-12-05 RX ORDER — MIRTAZAPINE 7.5 MG/1
7.5 TABLET, FILM COATED ORAL
Qty: 30 TABLET | Refills: 2 | Status: SHIPPED | OUTPATIENT
Start: 2022-12-05

## 2022-12-05 NOTE — PROGRESS NOTES
HISTORY OF PRESENT ILLNESS  Caitlin Humphreys is a 81 y.o. female. HPI  rKistopher Garcia is seen to discuss recent issues. She was wearing a cardiac event monitor for a month, notes while wearing it she did develop a rash on her chest.  The monitor is off now. She also had about two weeks of facial flushing, which has all resolved. Has chronic dyspnea on exertion, which did not change significantly. No fevers or chills. She has known pulmonary MAC and has been seen by Dr. Eyal Painter and Dr. Kishan Pate and has been offered treatment for her progressive lung disease, but is very reluctant given the complexity of the regimen and her fear of adding more meds. Does note it is hard to gain weight. On my scale she is down another 5 pounds and BMI is only 17. Notes she has no appetite, food does not taste good. She is trying to force herself to eat. 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 Review of Systems   Constitutional:  Positive for malaise/fatigue and weight loss. Negative for chills and fever. Respiratory:  Positive for shortness of breath. Negative for cough, hemoptysis and wheezing. Cardiovascular:  Negative for chest pain, palpitations, orthopnea, leg swelling and PND. Gastrointestinal:  Negative for abdominal pain, heartburn and nausea. Musculoskeletal:  Negative for myalgias. Skin:  Positive for rash. Neurological:  Negative for dizziness and headaches. Physical Exam  Vitals and nursing note reviewed. Constitutional:       Appearance: She is well-developed. HENT:      Head: Normocephalic and atraumatic. Neck:      Thyroid: No thyromegaly. Vascular: No carotid bruit. Cardiovascular:      Rate and Rhythm: Normal rate and regular rhythm. Heart sounds: Normal heart sounds, S1 normal and S2 normal. No murmur heard. Pulmonary:      Effort: Pulmonary effort is normal. No respiratory distress. Breath sounds: Rhonchi present. No wheezing or rales. Musculoskeletal:      Cervical back: Normal range of motion and neck supple. Skin:     Comments: Scattered areas of excoriations and a few papules on ant chest in distribution of recent cardiac monitor   Not dermatomal    Neurological:      Mental Status: She is alert and oriented to person, place, and time. Psychiatric:         Behavior: Behavior normal.       ASSESSMENT and PLAN  Diagnoses and all orders for this visit:    1. HTN, goal below 130/80-cont meds    2. Flushing-now resolved    3. Atypical mycobacterial disease    4. Dyspnea on exertion    5. Weight loss  -     mirtazapine (REMERON) 7.5 mg tablet; Take 1 Tablet by mouth nightly.       the following changes in treatment are made: given  ongoing weight loss and poor appetite which may be from the mac but as she is reluctant to pursue rx for this discussed trial of mirtazepine for weight loss and  mood  Appt in 6 weeks

## 2023-01-17 ENCOUNTER — OFFICE VISIT (OUTPATIENT)
Dept: INTERNAL MEDICINE CLINIC | Age: 83
End: 2023-01-17
Payer: MEDICARE

## 2023-01-17 VITALS
HEIGHT: 65 IN | BODY MASS INDEX: 17.83 KG/M2 | RESPIRATION RATE: 16 BRPM | WEIGHT: 107 LBS | DIASTOLIC BLOOD PRESSURE: 86 MMHG | OXYGEN SATURATION: 99 % | TEMPERATURE: 97.4 F | SYSTOLIC BLOOD PRESSURE: 141 MMHG

## 2023-01-17 DIAGNOSIS — I50.20 SYSTOLIC HEART FAILURE, UNSPECIFIED HF CHRONICITY (HCC): ICD-10-CM

## 2023-01-17 DIAGNOSIS — J47.9 BRONCHIECTASIS WITHOUT COMPLICATION (HCC): ICD-10-CM

## 2023-01-17 DIAGNOSIS — A31.0 MAI (MYCOBACTERIUM AVIUM-INTRACELLULARE) (HCC): ICD-10-CM

## 2023-01-17 DIAGNOSIS — I48.0 PAROXYSMAL ATRIAL FIBRILLATION (HCC): ICD-10-CM

## 2023-01-17 DIAGNOSIS — F33.1 MAJOR DEPRESSIVE DISORDER, RECURRENT, MODERATE (HCC): ICD-10-CM

## 2023-01-17 DIAGNOSIS — R63.4 WEIGHT LOSS: Primary | ICD-10-CM

## 2023-01-17 DIAGNOSIS — C80.1 MALIGNANT (PRIMARY) NEOPLASM, UNSPECIFIED (HCC): ICD-10-CM

## 2023-01-17 DIAGNOSIS — Z09 HOSPITAL DISCHARGE FOLLOW-UP: ICD-10-CM

## 2023-01-17 DIAGNOSIS — I82.409 DEEP VEIN THROMBOSIS (DVT) OF LOWER EXTREMITY, UNSPECIFIED CHRONICITY, UNSPECIFIED LATERALITY, UNSPECIFIED VEIN (HCC): ICD-10-CM

## 2023-01-17 PROBLEM — F33.0 MAJOR DEPRESSIVE DISORDER, RECURRENT, MILD (HCC): Status: ACTIVE | Noted: 2023-01-17

## 2023-01-17 PROBLEM — I48.91 ATRIAL FIBRILLATION WITH RVR (HCC): Status: RESOLVED | Noted: 2021-05-25 | Resolved: 2023-01-17

## 2023-01-17 PROBLEM — F33.9 MAJOR DEPRESSIVE DISORDER, RECURRENT, UNSPECIFIED (HCC): Status: ACTIVE | Noted: 2023-01-17

## 2023-01-17 PROBLEM — F33.0 MAJOR DEPRESSIVE DISORDER, RECURRENT, MILD (HCC): Status: RESOLVED | Noted: 2023-01-17 | Resolved: 2023-01-17

## 2023-01-17 PROCEDURE — G8536 NO DOC ELDER MAL SCRN: HCPCS | Performed by: INTERNAL MEDICINE

## 2023-01-17 PROCEDURE — G8419 CALC BMI OUT NRM PARAM NOF/U: HCPCS | Performed by: INTERNAL MEDICINE

## 2023-01-17 PROCEDURE — 1111F DSCHRG MED/CURRENT MED MERGE: CPT | Performed by: INTERNAL MEDICINE

## 2023-01-17 PROCEDURE — 1101F PT FALLS ASSESS-DOCD LE1/YR: CPT | Performed by: INTERNAL MEDICINE

## 2023-01-17 PROCEDURE — G8432 DEP SCR NOT DOC, RNG: HCPCS | Performed by: INTERNAL MEDICINE

## 2023-01-17 PROCEDURE — 99214 OFFICE O/P EST MOD 30 MIN: CPT | Performed by: INTERNAL MEDICINE

## 2023-01-17 PROCEDURE — G8427 DOCREV CUR MEDS BY ELIG CLIN: HCPCS | Performed by: INTERNAL MEDICINE

## 2023-01-17 PROCEDURE — 1090F PRES/ABSN URINE INCON ASSESS: CPT | Performed by: INTERNAL MEDICINE

## 2023-01-17 PROCEDURE — G0463 HOSPITAL OUTPT CLINIC VISIT: HCPCS | Performed by: INTERNAL MEDICINE

## 2023-01-17 RX ORDER — MIRTAZAPINE 15 MG/1
15 TABLET, FILM COATED ORAL
Qty: 30 TABLET | Refills: 5 | Status: SHIPPED | OUTPATIENT
Start: 2023-01-17

## 2023-01-17 NOTE — PROGRESS NOTES
HISTORY OF PRESENT ILLNESS  Rachna Humphreys is a 81 y.o. female. HPI  Seen at follow up after starting on Mirtazapine 7.5 mg for depressive symptoms, as well as nausea and weight loss. She notes it has really helped resolve her nausea. Her weight is stable. She does note some bad dreams and we discussed bumping to the 15 mg to help with this. Since our last visit did have an urgent care evaluation at an [facility] facility, reports on December 29th showed chest xray showing ground glass opacities and nodules. No acute findings. She does have known CLIFF on CAT scans. Influenza swabs were negative. Labs showed a UA 0-2 red cells, 1-5 white cells, rare epithelial cells, white count 9,000, HGB 14, HCT 43, platelets 934, mild lymphocytosis, BUN 15, creatinine 0.84, potassium 4.3, alk phos 139. She was discharged with Keflex. Denies current UTI symptoms. Does have ongoing fatigue. Minimal cough, mostly nonproductive. Her  sale unfortunately fell through. Review of Systems   Constitutional:  Positive for malaise/fatigue. Negative for chills, diaphoresis, fever and weight loss. Respiratory:  Negative for cough, shortness of breath and wheezing. Cardiovascular:  Negative for chest pain, palpitations, orthopnea, leg swelling and PND. Gastrointestinal:  Negative for abdominal pain, diarrhea, heartburn, nausea and vomiting. Musculoskeletal:  Negative for myalgias. Neurological:  Negative for dizziness and headaches. Psychiatric/Behavioral:  The patient has insomnia. Physical Exam  Vitals and nursing note reviewed. Constitutional:       Appearance: She is well-developed. Comments: Frail weight is stable   HENT:      Head: Normocephalic and atraumatic. Neck:      Thyroid: No thyromegaly. Vascular: No carotid bruit. Cardiovascular:      Rate and Rhythm: Normal rate and regular rhythm. Heart sounds: Normal heart sounds, S1 normal and S2 normal. No murmur heard.   Pulmonary: Effort: Pulmonary effort is normal. No respiratory distress. Breath sounds: Normal breath sounds. No wheezing or rales. Musculoskeletal:      Cervical back: Normal range of motion and neck supple. Right lower leg: No edema. Left lower leg: No edema. Neurological:      Mental Status: She is alert and oriented to person, place, and time. Psychiatric:         Behavior: Behavior normal.       ASSESSMENT and PLAN  Diagnoses and all orders for this visit:    1. Weight loss  -     mirtazapine (REMERON) 15 mg tablet; Take 1 Tablet by mouth nightly. 2. Systolic heart failure, unspecified HF chronicity ([address]  Currently euvolemic  3. Bronchiectasis without complication ([address]    4. Deep vein thrombosis (DVT) of lower extremity, unspecified chronicity, unspecified laterality, unspecified vein (HCC)-no current dvt seen cont on eliquis    5. Paroxysmal atrial fibrillation (HCC)-remains on amiodarone and considering visit with a different cardiology practice    6. Malignant (primary) neoplasm, unspecified ([address]    7. 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  Major depressive disorder, recurrent, moderate-better with remeron      the following changes in treatment are made: inc from 7.5 to 15 mg remeron for nightmares and weight loss  Nausea did improve with med

## 2023-01-30 RX ORDER — VALSARTAN 320 MG/1
320 TABLET ORAL DAILY
Qty: 90 TABLET | Refills: 1 | Status: SHIPPED | OUTPATIENT
Start: 2023-01-30

## 2023-02-02 ENCOUNTER — OFFICE VISIT (OUTPATIENT)
Dept: CARDIOLOGY CLINIC | Age: 83
End: 2023-02-02
Payer: MEDICARE

## 2023-02-02 VITALS
HEIGHT: 65 IN | DIASTOLIC BLOOD PRESSURE: 70 MMHG | RESPIRATION RATE: 16 BRPM | HEART RATE: 98 BPM | BODY MASS INDEX: 18.16 KG/M2 | SYSTOLIC BLOOD PRESSURE: 138 MMHG | WEIGHT: 109 LBS | OXYGEN SATURATION: 98 %

## 2023-02-02 DIAGNOSIS — E78.5 DYSLIPIDEMIA: ICD-10-CM

## 2023-02-02 DIAGNOSIS — A31.9 ATYPICAL MYCOBACTERIAL DISEASE: ICD-10-CM

## 2023-02-02 DIAGNOSIS — I10 HYPERTENSION, ESSENTIAL, BENIGN: ICD-10-CM

## 2023-02-02 DIAGNOSIS — I10 HTN, GOAL BELOW 130/80: ICD-10-CM

## 2023-02-02 DIAGNOSIS — I48.19 PERSISTENT ATRIAL FIBRILLATION (HCC): Primary | ICD-10-CM

## 2023-02-02 PROBLEM — I48.0 PAROXYSMAL ATRIAL FIBRILLATION (HCC): Status: RESOLVED | Noted: 2019-11-14 | Resolved: 2023-02-02

## 2023-02-02 PROBLEM — A31.0 PULMONARY MAI (MYCOBACTERIUM AVIUM-INTRACELLULARE) INFECTION (HCC): Status: ACTIVE | Noted: 2023-01-09

## 2023-02-02 PROCEDURE — G0463 HOSPITAL OUTPT CLINIC VISIT: HCPCS | Performed by: SPECIALIST

## 2023-02-02 PROCEDURE — 93005 ELECTROCARDIOGRAM TRACING: CPT | Performed by: SPECIALIST

## 2023-02-02 RX ORDER — AMIODARONE HYDROCHLORIDE 200 MG/1
TABLET ORAL
Qty: 60 TABLET | Refills: 1 | Status: SHIPPED | OUTPATIENT
Start: 2023-02-02

## 2023-02-02 RX ORDER — CARVEDILOL 3.12 MG/1
12.5 TABLET ORAL 2 TIMES DAILY WITH MEALS
COMMUNITY
Start: 2023-02-02

## 2023-02-02 NOTE — PATIENT INSTRUCTIONS
Decrease amiodarone to 5 days/week. May take Monday through Friday. Follow up with Dr. Emma Robledo in 3 months.
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
## 2023-02-02 NOTE — LETTER
2/2/2023    Patient: Angela Humphreys   YOB: 1940   Date of Visit: 2/2/2023     Elaine Navas, [address] [facility]  [address]  [address] [place] [address]  Via In Basket    Dear Elaine Navas MD,      Thank you for referring Ms. Solitario Humphreys to [address] [place] [address] for evaluation. My notes for this consultation are attached. If you have questions, please do not hesitate to call me. I look forward to following your patient along with you.       Sincerely,    Karen Calderon MD

## 2023-03-04 DIAGNOSIS — E78.5 DYSLIPIDEMIA: ICD-10-CM

## 2023-03-04 RX ORDER — ATORVASTATIN CALCIUM 10 MG/1
TABLET, FILM COATED ORAL
Qty: 45 TABLET | Refills: 1 | Status: SHIPPED | OUTPATIENT
Start: 2023-03-04

## 2023-03-08 DIAGNOSIS — I10 HTN, GOAL BELOW 130/80: ICD-10-CM

## 2023-03-08 RX ORDER — AMLODIPINE BESYLATE 5 MG/1
TABLET ORAL
Qty: 90 TABLET | Refills: 1 | Status: SHIPPED | OUTPATIENT
Start: 2023-03-08

## 2023-05-01 DIAGNOSIS — E78.5 DYSLIPIDEMIA: ICD-10-CM

## 2023-05-01 RX ORDER — ATORVASTATIN CALCIUM 10 MG/1
TABLET, FILM COATED ORAL
Qty: 45 TABLET | Refills: 1 | Status: SHIPPED | OUTPATIENT
Start: 2023-05-01

## 2023-06-22 ENCOUNTER — OFFICE VISIT (OUTPATIENT)
Age: 83
End: 2023-06-22
Payer: MEDICARE

## 2023-06-22 VITALS
DIASTOLIC BLOOD PRESSURE: 70 MMHG | RESPIRATION RATE: 16 BRPM | BODY MASS INDEX: 18.43 KG/M2 | SYSTOLIC BLOOD PRESSURE: 131 MMHG | WEIGHT: 110.6 LBS | HEIGHT: 65 IN | TEMPERATURE: 97.6 F | HEART RATE: 85 BPM | OXYGEN SATURATION: 96 %

## 2023-06-22 DIAGNOSIS — R41.3 MEMORY LOSS: ICD-10-CM

## 2023-06-22 DIAGNOSIS — I48.0 PAROXYSMAL ATRIAL FIBRILLATION (HCC): ICD-10-CM

## 2023-06-22 DIAGNOSIS — Z00.00 MEDICARE ANNUAL WELLNESS VISIT, SUBSEQUENT: Primary | ICD-10-CM

## 2023-06-22 DIAGNOSIS — J47.9 BRONCHIECTASIS, UNCOMPLICATED (HCC): ICD-10-CM

## 2023-06-22 DIAGNOSIS — E78.5 DYSLIPIDEMIA, GOAL LDL BELOW 100: ICD-10-CM

## 2023-06-22 DIAGNOSIS — R63.4 WEIGHT LOSS: Primary | ICD-10-CM

## 2023-06-22 DIAGNOSIS — Z79.01 CHRONIC ANTICOAGULATION: ICD-10-CM

## 2023-06-22 DIAGNOSIS — Z23 NEED FOR PROPHYLACTIC VACCINATION AGAINST STREPTOCOCCUS PNEUMONIAE (PNEUMOCOCCUS): ICD-10-CM

## 2023-06-22 PROCEDURE — G0009 ADMIN PNEUMOCOCCAL VACCINE: HCPCS | Performed by: INTERNAL MEDICINE

## 2023-06-22 PROCEDURE — 4004F PT TOBACCO SCREEN RCVD TLK: CPT | Performed by: INTERNAL MEDICINE

## 2023-06-22 PROCEDURE — G8419 CALC BMI OUT NRM PARAM NOF/U: HCPCS | Performed by: INTERNAL MEDICINE

## 2023-06-22 PROCEDURE — 1123F ACP DISCUSS/DSCN MKR DOCD: CPT | Performed by: INTERNAL MEDICINE

## 2023-06-22 PROCEDURE — 3075F SYST BP GE 130 - 139MM HG: CPT | Performed by: INTERNAL MEDICINE

## 2023-06-22 PROCEDURE — 1090F PRES/ABSN URINE INCON ASSESS: CPT | Performed by: INTERNAL MEDICINE

## 2023-06-22 PROCEDURE — G8427 DOCREV CUR MEDS BY ELIG CLIN: HCPCS | Performed by: INTERNAL MEDICINE

## 2023-06-22 PROCEDURE — G0439 PPPS, SUBSEQ VISIT: HCPCS | Performed by: INTERNAL MEDICINE

## 2023-06-22 PROCEDURE — 90677 PCV20 VACCINE IM: CPT | Performed by: INTERNAL MEDICINE

## 2023-06-22 PROCEDURE — 3078F DIAST BP <80 MM HG: CPT | Performed by: INTERNAL MEDICINE

## 2023-06-22 PROCEDURE — G8399 PT W/DXA RESULTS DOCUMENT: HCPCS | Performed by: INTERNAL MEDICINE

## 2023-06-22 PROCEDURE — 99214 OFFICE O/P EST MOD 30 MIN: CPT | Performed by: INTERNAL MEDICINE

## 2023-06-22 RX ORDER — MIRTAZAPINE 15 MG/1
15 TABLET, FILM COATED ORAL NIGHTLY
Qty: 90 TABLET | Refills: 1 | Status: SHIPPED | OUTPATIENT
Start: 2023-06-22

## 2023-06-22 SDOH — ECONOMIC STABILITY: HOUSING INSECURITY
IN THE LAST 12 MONTHS, WAS THERE A TIME WHEN YOU DID NOT HAVE A STEADY PLACE TO SLEEP OR SLEPT IN A SHELTER (INCLUDING NOW)?: PATIENT REFUSED

## 2023-06-22 SDOH — ECONOMIC STABILITY: INCOME INSECURITY: HOW HARD IS IT FOR YOU TO PAY FOR THE VERY BASICS LIKE FOOD, HOUSING, MEDICAL CARE, AND HEATING?: PATIENT DECLINED

## 2023-06-22 SDOH — ECONOMIC STABILITY: FOOD INSECURITY: WITHIN THE PAST 12 MONTHS, YOU WORRIED THAT YOUR FOOD WOULD RUN OUT BEFORE YOU GOT MONEY TO BUY MORE.: PATIENT DECLINED

## 2023-06-22 SDOH — ECONOMIC STABILITY: FOOD INSECURITY: WITHIN THE PAST 12 MONTHS, THE FOOD YOU BOUGHT JUST DIDN'T LAST AND YOU DIDN'T HAVE MONEY TO GET MORE.: PATIENT DECLINED

## 2023-06-22 ASSESSMENT — PATIENT HEALTH QUESTIONNAIRE - PHQ9
1. LITTLE INTEREST OR PLEASURE IN DOING THINGS: 0
SUM OF ALL RESPONSES TO PHQ QUESTIONS 1-9: 0
SUM OF ALL RESPONSES TO PHQ9 QUESTIONS 1 & 2: 0
SUM OF ALL RESPONSES TO PHQ QUESTIONS 1-9: 0
2. FEELING DOWN, DEPRESSED OR HOPELESS: 0

## 2023-06-22 ASSESSMENT — LIFESTYLE VARIABLES: HOW MANY STANDARD DRINKS CONTAINING ALCOHOL DO YOU HAVE ON A TYPICAL DAY: PATIENT DOES NOT DRINK

## 2023-06-22 NOTE — PROGRESS NOTES
breath sounds. Genitourinary:     Comments: Breasts: breasts appear normal, no suspicious masses, no skin or nipple changes or axillary nodes    Musculoskeletal:      Right lower leg: No edema. Left lower leg: No edema. Neurological:      General: No focal deficit present. Mental Status: She is alert. Psychiatric:         Behavior: Behavior normal.                An electronic signature was used to authenticate this note. --Mark Abreu MD Medicare Annual Wellness Visit    Maikel Phillips is here for Medicare AWV    Assessment & Plan   Medicare annual wellness visit, subsequent  Bronchiectasis, uncomplicated ([address]  Paroxysmal atrial fibrillation ([address]  Memory loss  -     [address], [place], [place], Neuropsychology, [place]  -     Vitamin B12; Future  Dyslipidemia, goal LDL below 100  -     Lipid Panel; Future  -     Comprehensive Metabolic Panel; Future  Chronic anticoagulation  Need for prophylactic vaccination against Streptococcus pneumoniae (pneumococcus)  -     Pneumococcal, PCV20, PREVNAR 20, (age 25 yrs+), IM, PF    Recommendations for Preventive Services Due: see orders and patient instructions/AVS.  Recommended screening schedule for the next 5-10 years is provided to the patient in written form: see Patient Instructions/AVS.     No follow-ups on file. Subjective       Patient's complete Health Risk Assessment and screening values have been reviewed and are found in Flowsheets. The following problems were reviewed today and where indicated follow up appointments were made and/or referrals ordered. Positive Risk Factor Screenings with Interventions:       Cognitive:    Words recalled: 2 Words Recalled   Clock Drawing Test (CDT): (!) Abnormal   Total Score: (!) 2   Total Score Interpretation: Abnormal Mini-Cog      Interventions:  Refer for further eval             Weight and Activity:  Physical Activity: Insufficiently Active    Days of Exercise per Week: 5 days    Minutes of

## 2023-07-27 DIAGNOSIS — E78.5 DYSLIPIDEMIA, GOAL LDL BELOW 100: ICD-10-CM

## 2023-07-27 DIAGNOSIS — R41.3 MEMORY LOSS: ICD-10-CM

## 2023-07-28 LAB
ALBUMIN SERPL-MCNC: 4 G/DL (ref 3.5–5)
ALBUMIN/GLOB SERPL: 1.2 (ref 1.1–2.2)
ALP SERPL-CCNC: 116 U/L (ref 45–117)
ALT SERPL-CCNC: 44 U/L (ref 12–78)
ANION GAP SERPL CALC-SCNC: 6 MMOL/L (ref 5–15)
AST SERPL-CCNC: 31 U/L (ref 15–37)
BILIRUB SERPL-MCNC: 0.4 MG/DL (ref 0.2–1)
BUN SERPL-MCNC: 25 MG/DL (ref 6–20)
BUN/CREAT SERPL: 26 (ref 12–20)
CALCIUM SERPL-MCNC: 9.1 MG/DL (ref 8.5–10.1)
CHLORIDE SERPL-SCNC: 107 MMOL/L (ref 97–108)
CHOLEST SERPL-MCNC: 167 MG/DL
CO2 SERPL-SCNC: 24 MMOL/L (ref 21–32)
CREAT SERPL-MCNC: 0.98 MG/DL (ref 0.55–1.02)
GLOBULIN SER CALC-MCNC: 3.3 G/DL (ref 2–4)
GLUCOSE SERPL-MCNC: 85 MG/DL (ref 65–100)
HDLC SERPL-MCNC: 70 MG/DL
HDLC SERPL: 2.4 (ref 0–5)
LDLC SERPL CALC-MCNC: 84.6 MG/DL (ref 0–100)
POTASSIUM SERPL-SCNC: 4.2 MMOL/L (ref 3.5–5.1)
PROT SERPL-MCNC: 7.3 G/DL (ref 6.4–8.2)
SODIUM SERPL-SCNC: 137 MMOL/L (ref 136–145)
TRIGL SERPL-MCNC: 62 MG/DL
VIT B12 SERPL-MCNC: 443 PG/ML (ref 193–986)
VLDLC SERPL CALC-MCNC: 12.4 MG/DL

## 2023-09-14 DIAGNOSIS — I10 ESSENTIAL (PRIMARY) HYPERTENSION: Primary | ICD-10-CM

## 2023-09-14 RX ORDER — VALSARTAN 320 MG/1
320 TABLET ORAL DAILY
Qty: 90 TABLET | Refills: 1 | Status: SHIPPED | OUTPATIENT
Start: 2023-09-14

## 2023-10-30 DIAGNOSIS — I10 ESSENTIAL (PRIMARY) HYPERTENSION: ICD-10-CM

## 2023-10-30 RX ORDER — AMLODIPINE BESYLATE 5 MG/1
TABLET ORAL
Qty: 90 TABLET | Refills: 0 | Status: SHIPPED | OUTPATIENT
Start: 2023-10-30

## 2024-01-02 ENCOUNTER — OFFICE VISIT (OUTPATIENT)
Age: 84
End: 2024-01-02
Payer: MEDICARE

## 2024-01-02 VITALS
HEART RATE: 81 BPM | WEIGHT: 109.6 LBS | TEMPERATURE: 98.1 F | HEIGHT: 65 IN | BODY MASS INDEX: 18.26 KG/M2 | SYSTOLIC BLOOD PRESSURE: 138 MMHG | OXYGEN SATURATION: 98 % | DIASTOLIC BLOOD PRESSURE: 80 MMHG | RESPIRATION RATE: 16 BRPM

## 2024-01-02 DIAGNOSIS — E78.5 DYSLIPIDEMIA, GOAL LDL BELOW 100: ICD-10-CM

## 2024-01-02 DIAGNOSIS — J47.9 BRONCHIECTASIS, UNCOMPLICATED (HCC): ICD-10-CM

## 2024-01-02 DIAGNOSIS — C80.1 MALIGNANT (PRIMARY) NEOPLASM, UNSPECIFIED (HCC): ICD-10-CM

## 2024-01-02 DIAGNOSIS — R91.1 LUNG NODULE: ICD-10-CM

## 2024-01-02 DIAGNOSIS — I48.0 PAROXYSMAL ATRIAL FIBRILLATION (HCC): ICD-10-CM

## 2024-01-02 DIAGNOSIS — F33.1 MAJOR DEPRESSIVE DISORDER, RECURRENT, MODERATE (HCC): ICD-10-CM

## 2024-01-02 DIAGNOSIS — A31.9 ATYPICAL MYCOBACTERIAL DISEASE: ICD-10-CM

## 2024-01-02 DIAGNOSIS — I50.20 SYSTOLIC CONGESTIVE HEART FAILURE, UNSPECIFIED HF CHRONICITY (HCC): ICD-10-CM

## 2024-01-02 DIAGNOSIS — I10 HTN, GOAL BELOW 130/80: Primary | ICD-10-CM

## 2024-01-02 LAB
ALBUMIN SERPL-MCNC: 4.2 G/DL (ref 3.5–5)
ALBUMIN/GLOB SERPL: 1.1 (ref 1.1–2.2)
ALP SERPL-CCNC: 128 U/L (ref 45–117)
ALT SERPL-CCNC: 35 U/L (ref 12–78)
ANION GAP SERPL CALC-SCNC: 6 MMOL/L (ref 5–15)
AST SERPL-CCNC: 28 U/L (ref 15–37)
BILIRUB SERPL-MCNC: 0.4 MG/DL (ref 0.2–1)
BUN SERPL-MCNC: 24 MG/DL (ref 6–20)
BUN/CREAT SERPL: 20 (ref 12–20)
CALCIUM SERPL-MCNC: 9.2 MG/DL (ref 8.5–10.1)
CHLORIDE SERPL-SCNC: 105 MMOL/L (ref 97–108)
CHOLEST SERPL-MCNC: 192 MG/DL
CO2 SERPL-SCNC: 27 MMOL/L (ref 21–32)
CREAT SERPL-MCNC: 1.19 MG/DL (ref 0.55–1.02)
GLOBULIN SER CALC-MCNC: 3.7 G/DL (ref 2–4)
GLUCOSE SERPL-MCNC: 102 MG/DL (ref 65–100)
HDLC SERPL-MCNC: 79 MG/DL
HDLC SERPL: 2.4 (ref 0–5)
LDLC SERPL CALC-MCNC: 98.4 MG/DL (ref 0–100)
POTASSIUM SERPL-SCNC: 4.7 MMOL/L (ref 3.5–5.1)
PROT SERPL-MCNC: 7.9 G/DL (ref 6.4–8.2)
SODIUM SERPL-SCNC: 138 MMOL/L (ref 136–145)
TRIGL SERPL-MCNC: 73 MG/DL
VLDLC SERPL CALC-MCNC: 14.6 MG/DL

## 2024-01-02 PROCEDURE — 1123F ACP DISCUSS/DSCN MKR DOCD: CPT | Performed by: INTERNAL MEDICINE

## 2024-01-02 PROCEDURE — 99214 OFFICE O/P EST MOD 30 MIN: CPT | Performed by: INTERNAL MEDICINE

## 2024-01-02 PROCEDURE — G8419 CALC BMI OUT NRM PARAM NOF/U: HCPCS | Performed by: INTERNAL MEDICINE

## 2024-01-02 PROCEDURE — G8427 DOCREV CUR MEDS BY ELIG CLIN: HCPCS | Performed by: INTERNAL MEDICINE

## 2024-01-02 PROCEDURE — G8484 FLU IMMUNIZE NO ADMIN: HCPCS | Performed by: INTERNAL MEDICINE

## 2024-01-02 PROCEDURE — 1090F PRES/ABSN URINE INCON ASSESS: CPT | Performed by: INTERNAL MEDICINE

## 2024-01-02 PROCEDURE — 4004F PT TOBACCO SCREEN RCVD TLK: CPT | Performed by: INTERNAL MEDICINE

## 2024-01-02 PROCEDURE — 3079F DIAST BP 80-89 MM HG: CPT | Performed by: INTERNAL MEDICINE

## 2024-01-02 PROCEDURE — G8399 PT W/DXA RESULTS DOCUMENT: HCPCS | Performed by: INTERNAL MEDICINE

## 2024-01-02 PROCEDURE — 3075F SYST BP GE 130 - 139MM HG: CPT | Performed by: INTERNAL MEDICINE

## 2024-01-02 NOTE — PROGRESS NOTES
Eloise Phillips (:  1940) is a 83 y.o. female,Established patient, here for evaluation of the following chief complaint(s):  6 Month Follow-Up         ASSESSMENT/PLAN:  1. HTN, goal below 130/80-controlled on 2 meds  2. Lung nodule-cont with regular scans at [facility]  3. Systolic congestive heart failure, unspecified HF chronicity (HCC)  4. Major depressive disorder, recurrent, moderate (HCC)-sxs inc  encouraged resume the mirtazepine 15 mg qhs  5. Bronchiectasis, uncomplicated (HCC)  6. Paroxysmal atrial fibrillation (HCC)  7. Atypical mycobacterial disease  8. Dyslipidemia, goal LDL below 100-on lipitor 5 mg dose  -     Comprehensive Metabolic Panel; Future  -     Lipid Panel; Future  9. Malignant (primary) neoplasm, unspecified (HCC)-followed at [facility] for ho breast cancer  Sees dr louis for  neuropsych testing this spring      No follow-ups on file.         Subjective   SUBJECTIVE/OBJECTIVE:  HPI  Seen at 6-month follow-up for med check.  She is working with cardiology at [facility] for heart failure and paroxysmal A-fib and has procedure planned in the morning for pacemaker.  She is also being seen at [facility] for follow-up on atypical mycobacterial disease as well as history of multiple lung nodules.    Hypertension remains  on amlodipine 5 mg and valsartan 320 mg.  No headaches or dizzy spells.    Depression stopped mirtazapine sometime ago notes that sleep is currently good but that she does feel sad many days and down.  Endorses a good appetite but struggles with gaining weight.  Does have chronic cough.  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    Dyslipidemia on atorvastatin half of a 10 mg daily due for lipids did recently have normal LFTs.    Review of Systems       Objective   Physical Exam  Constitutional:       Appearance: Normal appearance.      Comments: Thin and frail in nad   HENT:      Head: Normocephalic and

## 2024-01-03 ENCOUNTER — TELEPHONE (OUTPATIENT)
Age: 84
End: 2024-01-03

## 2024-01-03 DIAGNOSIS — R79.89 ABNORMAL BLOOD CREATININE LEVEL: Primary | ICD-10-CM

## 2024-01-03 NOTE — TELEPHONE ENCOUNTER
----- Message from Criss Bautista MD sent at 1/3/2024  8:33 AM EST -----  Please notify her that her cr is up a bit from baseline  Encourage hydration with water and I did order a repeat cmp to do in a month at our lab-she can come in directly to have this drawn at lab  Cholesterol and other chemistries are good thanks

## 2024-01-04 ENCOUNTER — TELEPHONE (OUTPATIENT)
Age: 84
End: 2024-01-04

## 2024-01-04 NOTE — TELEPHONE ENCOUNTER
----- Message from Etta Kaye sent at 1/3/2024  4:08 PM EST -----  Subject: Message to Provider    QUESTIONS  Information for Provider? Patient returned call for Mary regarding   test results. Please call to discuss. If you do not call on 01/03/24, call   the work number. Cannot leave a message on the work number.   ---------------------------------------------------------------------------  --------------  CALL BACK INFO  [phone]; OK to leave message on voicemail  ---------------------------------------------------------------------------  --------------  SCRIPT ANSWERS  undefined

## 2024-01-08 ENCOUNTER — TELEPHONE (OUTPATIENT)
Age: 84
End: 2024-01-08

## 2024-01-08 NOTE — TELEPHONE ENCOUNTER
Patient notified of cr level. Encouraged more hydration with water & repeat lab in a month with the lab upstairs from the office. Patient voiced understanding. Advised her other labs are fine.

## 2024-01-08 NOTE — TELEPHONE ENCOUNTER
Patient is calling requesting a call back from the nurse to discuss her lab results. Please advise

## 2024-01-31 DIAGNOSIS — E78.5 HYPERLIPIDEMIA, UNSPECIFIED: ICD-10-CM

## 2024-01-31 DIAGNOSIS — I10 ESSENTIAL (PRIMARY) HYPERTENSION: ICD-10-CM

## 2024-01-31 RX ORDER — ATORVASTATIN CALCIUM 10 MG/1
TABLET, FILM COATED ORAL
Qty: 45 TABLET | Refills: 3 | Status: SHIPPED | OUTPATIENT
Start: 2024-01-31

## 2024-01-31 RX ORDER — AMLODIPINE BESYLATE 5 MG/1
TABLET ORAL
Qty: 90 TABLET | Refills: 1 | Status: SHIPPED | OUTPATIENT
Start: 2024-01-31

## 2024-02-01 ENCOUNTER — OFFICE VISIT (OUTPATIENT)
Age: 84
End: 2024-02-01
Payer: MEDICARE

## 2024-02-01 DIAGNOSIS — F41.9 ANXIETY AND DEPRESSION: ICD-10-CM

## 2024-02-01 DIAGNOSIS — F43.9 STRESS: ICD-10-CM

## 2024-02-01 DIAGNOSIS — G31.84 MILD COGNITIVE IMPAIRMENT: Primary | ICD-10-CM

## 2024-02-01 DIAGNOSIS — F32.A ANXIETY AND DEPRESSION: ICD-10-CM

## 2024-02-01 PROCEDURE — 90791 PSYCH DIAGNOSTIC EVALUATION: CPT | Performed by: CLINICAL NEUROPSYCHOLOGIST

## 2024-02-01 PROCEDURE — 1036F TOBACCO NON-USER: CPT | Performed by: CLINICAL NEUROPSYCHOLOGIST

## 2024-02-01 PROCEDURE — 1123F ACP DISCUSS/DSCN MKR DOCD: CPT | Performed by: CLINICAL NEUROPSYCHOLOGIST
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
## 2024-02-19 ENCOUNTER — PROCEDURE VISIT (OUTPATIENT)
Age: 84
End: 2024-02-19

## 2024-02-19 DIAGNOSIS — F41.1 GENERALIZED ANXIETY DISORDER: ICD-10-CM

## 2024-02-19 DIAGNOSIS — G31.84 MILD COGNITIVE IMPAIRMENT: Primary | ICD-10-CM

## 2024-02-19 DIAGNOSIS — R41.840 ATTENTION DEFICIT: Chronic | ICD-10-CM
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
## 2024-04-03 ENCOUNTER — TELEPHONE (OUTPATIENT)
Age: 84
End: 2024-04-03

## 2024-04-03 NOTE — TELEPHONE ENCOUNTER
Patient was calling to speak with nurse regarding her wrist and thigh   Patient states that her walker fell on her and tore the skin open, she states this has happened before and was sent to wound care   Patient would like to speak to nurse when possible to see how she should address it

## 2024-04-03 NOTE — TELEPHONE ENCOUNTER
04/03/2024--I called and spoke with the patient and she stated that she has a tear on the top of her right thigh and on her right wrist, both about the size of a quarter and she stated that they are not actively bleeding, as she had some left over gel from wound care that she put on the areas and then covered them. She stated that she had called wound care at [facility] where she went before and they said that they can see her Monday at 8:30 am no referral needed. She just wanted to make sure that was okay and that she did not need to come in here first or anything. I let her know I would check with  and give her a call back. She verbalized understanding and had no further concerns or question's at that time.

## 2024-04-03 NOTE — TELEPHONE ENCOUNTER
04/03/2024--I called and spoke with the patient and let her know our recommendation's and she verbalized understanding and had no further concerns or question's at that time.

## 2024-04-04 DIAGNOSIS — I10 ESSENTIAL (PRIMARY) HYPERTENSION: ICD-10-CM

## 2024-04-04 RX ORDER — VALSARTAN 320 MG/1
320 TABLET ORAL DAILY
Qty: 90 TABLET | Refills: 1 | Status: SHIPPED | OUTPATIENT
Start: 2024-04-04

## 2024-05-03 ENCOUNTER — TELEPHONE (OUTPATIENT)
Age: 84
End: 2024-05-03

## 2024-05-06 ENCOUNTER — OFFICE VISIT (OUTPATIENT)
Age: 84
End: 2024-05-06
Payer: MEDICARE

## 2024-05-06 DIAGNOSIS — F43.9 STRESS: ICD-10-CM

## 2024-05-06 DIAGNOSIS — G31.84 MILD COGNITIVE IMPAIRMENT: Primary | ICD-10-CM

## 2024-05-06 DIAGNOSIS — F41.1 GENERALIZED ANXIETY DISORDER: ICD-10-CM

## 2024-05-06 PROCEDURE — 96132 NRPSYC TST EVAL PHYS/QHP 1ST: CPT | Performed by: CLINICAL NEUROPSYCHOLOGIST

## 2024-05-06 NOTE — PROGRESS NOTES
A neuropsychological evaluation was completed with the patient on 2/19/2024     Prior to seeing the patient for today's visit, I reviewed pertinent records, including the previously completed report, the records in Epic, and any updated visits from other providers since the patient's last visit.     During today's appointment I administered the following measures: NMSE, Fluency.  Exam normal          I provided feedback services related to the previously completed report. Attendees included: Patient. Education was provided regarding my diagnostic impressions, and we discussed treatment plan/options. Attendees were provided with the opportunity to ask questions, which were answered to the best of my ability.     We discussed, in detail, the following:    This examination was limited by the patient's rude and increasingly disrespectful behaviors towards the examiner during testing.  From the data that were generated, they are viewed as valid.  In this regard, there is evidence of mild chronic underlying attention deficit concern.  There is absolutely no evidence of even early signs of a dementing type process.  The patient was highly defensive and although she wrote statements like \"I am having a stressful day.\"  And repeatedly stated how anxious she was, she denied any anxiety and depression when formally asked on questionnaires.  I clearly see with mood/functional interference here.                  In addition to continue medical care, my recommendations include consideration for psychiatric treatment for anxiety as well as active engagement in counseling.  It may be wise to consider an appropriate medication for attention as well if this not medically contraindicated, though caution is advised in selecting same, given her age and anxiety concerns.  Otherwise, I have the patient is very reassured by these positive test results.  I am not concerned about capacity, driving, day-to-day supervision, etc. Baseline now

## 2024-05-27 DIAGNOSIS — I10 ESSENTIAL (PRIMARY) HYPERTENSION: ICD-10-CM

## 2024-05-28 RX ORDER — AMLODIPINE BESYLATE 5 MG/1
TABLET ORAL
Qty: 90 TABLET | Refills: 0 | Status: SHIPPED | OUTPATIENT
Start: 2024-05-28

## 2024-06-25 ENCOUNTER — OFFICE VISIT (OUTPATIENT)
Age: 84
End: 2024-06-25
Payer: MEDICARE

## 2024-06-25 VITALS
BODY MASS INDEX: 18.39 KG/M2 | TEMPERATURE: 97.8 F | HEIGHT: 65 IN | WEIGHT: 110.4 LBS | OXYGEN SATURATION: 96 % | SYSTOLIC BLOOD PRESSURE: 119 MMHG | DIASTOLIC BLOOD PRESSURE: 61 MMHG | RESPIRATION RATE: 18 BRPM | HEART RATE: 73 BPM

## 2024-06-25 DIAGNOSIS — E78.5 DYSLIPIDEMIA, GOAL LDL BELOW 100: ICD-10-CM

## 2024-06-25 DIAGNOSIS — A31.9 ATYPICAL MYCOBACTERIAL DISEASE: ICD-10-CM

## 2024-06-25 DIAGNOSIS — I05.9 MITRAL VALVE DISORDER: ICD-10-CM

## 2024-06-25 DIAGNOSIS — Z00.00 MEDICARE ANNUAL WELLNESS VISIT, SUBSEQUENT: Primary | ICD-10-CM

## 2024-06-25 DIAGNOSIS — Z79.01 CHRONIC ANTICOAGULATION: ICD-10-CM

## 2024-06-25 DIAGNOSIS — I48.0 PAROXYSMAL ATRIAL FIBRILLATION (HCC): ICD-10-CM

## 2024-06-25 DIAGNOSIS — J47.9 BRONCHIECTASIS WITHOUT COMPLICATION (HCC): ICD-10-CM

## 2024-06-25 DIAGNOSIS — I27.20 PULMONARY HTN (HCC): ICD-10-CM

## 2024-06-25 PROCEDURE — 1036F TOBACCO NON-USER: CPT | Performed by: INTERNAL MEDICINE

## 2024-06-25 PROCEDURE — 1090F PRES/ABSN URINE INCON ASSESS: CPT | Performed by: INTERNAL MEDICINE

## 2024-06-25 PROCEDURE — 1123F ACP DISCUSS/DSCN MKR DOCD: CPT | Performed by: INTERNAL MEDICINE

## 2024-06-25 PROCEDURE — G8427 DOCREV CUR MEDS BY ELIG CLIN: HCPCS | Performed by: INTERNAL MEDICINE

## 2024-06-25 PROCEDURE — G0439 PPPS, SUBSEQ VISIT: HCPCS | Performed by: INTERNAL MEDICINE

## 2024-06-25 PROCEDURE — 99214 OFFICE O/P EST MOD 30 MIN: CPT | Performed by: INTERNAL MEDICINE

## 2024-06-25 PROCEDURE — 3078F DIAST BP <80 MM HG: CPT | Performed by: INTERNAL MEDICINE

## 2024-06-25 PROCEDURE — 3074F SYST BP LT 130 MM HG: CPT | Performed by: INTERNAL MEDICINE

## 2024-06-25 PROCEDURE — G8399 PT W/DXA RESULTS DOCUMENT: HCPCS | Performed by: INTERNAL MEDICINE

## 2024-06-25 PROCEDURE — G8419 CALC BMI OUT NRM PARAM NOF/U: HCPCS | Performed by: INTERNAL MEDICINE

## 2024-06-25 SDOH — ECONOMIC STABILITY: FOOD INSECURITY: WITHIN THE PAST 12 MONTHS, YOU WORRIED THAT YOUR FOOD WOULD RUN OUT BEFORE YOU GOT MONEY TO BUY MORE.: NEVER TRUE

## 2024-06-25 SDOH — ECONOMIC STABILITY: HOUSING INSECURITY
IN THE LAST 12 MONTHS, WAS THERE A TIME WHEN YOU DID NOT HAVE A STEADY PLACE TO SLEEP OR SLEPT IN A SHELTER (INCLUDING NOW)?: NO

## 2024-06-25 SDOH — ECONOMIC STABILITY: INCOME INSECURITY: HOW HARD IS IT FOR YOU TO PAY FOR THE VERY BASICS LIKE FOOD, HOUSING, MEDICAL CARE, AND HEATING?: NOT HARD AT ALL

## 2024-06-25 SDOH — ECONOMIC STABILITY: FOOD INSECURITY: WITHIN THE PAST 12 MONTHS, THE FOOD YOU BOUGHT JUST DIDN'T LAST AND YOU DIDN'T HAVE MONEY TO GET MORE.: NEVER TRUE

## 2024-06-25 ASSESSMENT — PATIENT HEALTH QUESTIONNAIRE - PHQ9
SUM OF ALL RESPONSES TO PHQ QUESTIONS 1-9: 0
SUM OF ALL RESPONSES TO PHQ9 QUESTIONS 1 & 2: 0
SUM OF ALL RESPONSES TO PHQ QUESTIONS 1-9: 0
SUM OF ALL RESPONSES TO PHQ9 QUESTIONS 1 & 2: 0
SUM OF ALL RESPONSES TO PHQ QUESTIONS 1-9: 0
1. LITTLE INTEREST OR PLEASURE IN DOING THINGS: NOT AT ALL
SUM OF ALL RESPONSES TO PHQ QUESTIONS 1-9: 0
1. LITTLE INTEREST OR PLEASURE IN DOING THINGS: NOT AT ALL
2. FEELING DOWN, DEPRESSED OR HOPELESS: NOT AT ALL
2. FEELING DOWN, DEPRESSED OR HOPELESS: NOT AT ALL

## 2024-06-25 ASSESSMENT — LIFESTYLE VARIABLES
HOW MANY STANDARD DRINKS CONTAINING ALCOHOL DO YOU HAVE ON A TYPICAL DAY: PATIENT DOES NOT DRINK
HOW OFTEN DO YOU HAVE A DRINK CONTAINING ALCOHOL: NEVER

## 2024-06-25 NOTE — PATIENT INSTRUCTIONS
to your doctor if you need help losing weight.     Try to get 7 to 9 hours of sleep each night.     Limit alcohol to 2 drinks a day for men and 1 drink a day for women. Too much alcohol can cause health problems.     Manage other health problems such as diabetes, high blood pressure, and high cholesterol. If you think you may have a problem with alcohol or drug use, talk to your doctor.   Medicines    Take your medicines exactly as prescribed. Call your doctor if you think you are having a problem with your medicine.     If your doctor recommends aspirin, take the amount directed each day. Make sure you take aspirin and not another kind of pain reliever, such as acetaminophen (Tylenol).   When should you call for help?   Call 911 if you have symptoms of a heart attack. These may include:    Chest pain or pressure, or a strange feeling in the chest.     Sweating.     Shortness of breath.     Pain, pressure, or a strange feeling in the back, neck, jaw, or upper belly or in one or both shoulders or arms.     Lightheadedness or sudden weakness.     A fast or irregular heartbeat.   After you call 911, the  may tell you to chew 1 adult-strength or 2 to 4 low-dose aspirin. Wait for an ambulance. Do not try to drive yourself.  Watch closely for changes in your health, and be sure to contact your doctor if you have any problems.  Where can you learn more?  Go to https://www.[employer].net/patientEd and enter F075 to learn more about \"A Healthy Heart: Care Instructions.\"  Current as of: June 24, 2023  Content Version: 14.1  © 2006-2024 [employer].   Care instructions adapted under license by [employer]. If you have questions about a medical condition or this instruction, always ask your healthcare professional. [employer] disclaims any warranty or liability for your use of this information.      Personalized Preventive Plan for Eloise Phillips - 6/25/2024  Medicare offers a range of

## 2024-06-25 NOTE — PROGRESS NOTES
Eloise Phillips (:  1940) is a 83 y.o. female,Established patient, here for evaluation of the following chief complaint(s):  Medicare AWV      Assessment & Plan   1. Medicare annual wellness visit, subsequent  2. 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 Paroxysmal atrial fibrillation (HCC)-continues on eliquis and amiodarone  Discussed thyroid monitoring with the amiodarone  -     TSH; Future  4. Pulmonary HTN (HCC)-on echo mild  5. Mitral valve disorder-mild valvular regurgitation not especially symptomatic from this  6. Bronchiectasis without complication (HCC)  7. Chronic anticoagulation-for a fib no bleeding complications  -     CBC; Future  8. Dyslipidemia, goal LDL below 100-tolerates the lipitor 5 mg dose   -     Comprehensive Metabolic Panel; Future  -     Lipid Panel; Future  9 insomnia =-not currently using the mirtazepine  Had strange dreams on 15 mg dose will try 1/2 tab or 7.5 mg dose  Declines dexa  Continues with mammograms  Discussed colonoscopy I would not advise this at this time given significant heart and lung disease   No follow-ups on file.       Subjective   HPI  Seen for Medicare wellness visit.  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  This has been a long process of trying to sell the business.    Recently had neuro psych testing which fortunately did not show any sign of cognitive impairment he suggested anxiety and ADHD.  She seems to be functioning well.    Atypical mycobacterial infection of lungs followed by [facility] is due for follow-up chest CT this summer has ongoing cough productive of currently white phlegm no purulent phlegm no fevers or chills.  On no treatment currently for this chronic condition.    E[facility] 6 months ago [facility] showed mild mitral valve disease mild

## 2024-06-25 NOTE — PROGRESS NOTES
Room 17    I have reviewed all needed preparation for visit and have obtained necessary documentation. Identified pt with two pt identifiers (name and ). All patient medications has been reviewed.    Chief Complaint   Patient presents with    Medicare AWV       Vitals:    24 1405   BP: 119/61   Site: Left Upper Arm   Position: Sitting   Cuff Size: Medium Adult   Pulse: 73   Resp: 18   Temp: 97.8 °F (36.6 °C)   TempSrc: Oral   SpO2: 96%   Weight: 50.1 kg (110 lb 6.4 oz)   Height: 1.651 m (5' 5\")        Pain Score:   0 - No pain          2024     2:19 PM   PHQ-9    Little interest or pleasure in doing things 0   Feeling down, depressed, or hopeless 0   PHQ-2 Score 0   PHQ-9 Total Score 0            2024     2:12 PM   Amb Fall Risk Assessment and TUG Test   Do you feel unsteady or are you worried about falling?  no   2 or more falls in past year? no   Fall with injury in past year? no        Health Maintenance Due   Topic Date Due    Shingles vaccine (2 of 3) 2007    Annual Wellness Visit (Medicare)  2024    Depression Monitoring  2024        Health Maintenance Review: Patient reminded of \"due or due soon\" health maintenance. I have asked the patient to contact his/her primary care provider (PCP) for follow-up on his/her health maintenance.      Wt Readings from Last 3 Encounters:   24 50.1 kg (110 lb 6.4 oz)   24 49.7 kg (109 lb 9.6 oz)   23 50.2 kg (110 lb 9.6 oz)     Temp Readings from Last 3 Encounters:   24 97.8 °F (36.6 °C) (Oral)   24 98.1 °F (36.7 °C) (Oral)   23 97.6 °F (36.4 °C) (Oral)     BP Readings from Last 3 Encounters:   24 119/61   24 138/80   23 131/70     Pulse Readings from Last 3 Encounters:   24 73   24 81   23 85         Coordination of Care Questionnaire:   1. Have you been to the ER, urgent care clinic since your last visit?  Hospitalized since your last visit?No    2. Have you seen or

## 2024-06-26 LAB
ALBUMIN SERPL-MCNC: 4.2 G/DL (ref 3.5–5)
ALBUMIN/GLOB SERPL: 1.2 (ref 1.1–2.2)
ALP SERPL-CCNC: 146 U/L (ref 45–117)
ALT SERPL-CCNC: 48 U/L (ref 12–78)
ANION GAP SERPL CALC-SCNC: 5 MMOL/L (ref 5–15)
AST SERPL-CCNC: 27 U/L (ref 15–37)
BILIRUB SERPL-MCNC: 0.4 MG/DL (ref 0.2–1)
BUN SERPL-MCNC: 32 MG/DL (ref 6–20)
BUN/CREAT SERPL: 26 (ref 12–20)
CALCIUM SERPL-MCNC: 9.5 MG/DL (ref 8.5–10.1)
CHLORIDE SERPL-SCNC: 106 MMOL/L (ref 97–108)
CHOLEST SERPL-MCNC: 167 MG/DL
CO2 SERPL-SCNC: 26 MMOL/L (ref 21–32)
CREAT SERPL-MCNC: 1.21 MG/DL (ref 0.55–1.02)
ERYTHROCYTE [DISTWIDTH] IN BLOOD BY AUTOMATED COUNT: 13.6 % (ref 11.5–14.5)
GLOBULIN SER CALC-MCNC: 3.6 G/DL (ref 2–4)
GLUCOSE SERPL-MCNC: 98 MG/DL (ref 65–100)
HCT VFR BLD AUTO: 37.7 % (ref 35–47)
HDLC SERPL-MCNC: 79 MG/DL
HDLC SERPL: 2.1 (ref 0–5)
HGB BLD-MCNC: 12.1 G/DL (ref 11.5–16)
LDLC SERPL CALC-MCNC: 69.8 MG/DL (ref 0–100)
MCH RBC QN AUTO: 30.3 PG (ref 26–34)
MCHC RBC AUTO-ENTMCNC: 32.1 G/DL (ref 30–36.5)
MCV RBC AUTO: 94.5 FL (ref 80–99)
NRBC # BLD: 0 K/UL (ref 0–0.01)
NRBC BLD-RTO: 0 PER 100 WBC
PLATELET # BLD AUTO: 259 K/UL (ref 150–400)
PMV BLD AUTO: 10.1 FL (ref 8.9–12.9)
POTASSIUM SERPL-SCNC: 5 MMOL/L (ref 3.5–5.1)
PROT SERPL-MCNC: 7.8 G/DL (ref 6.4–8.2)
RBC # BLD AUTO: 3.99 M/UL (ref 3.8–5.2)
SODIUM SERPL-SCNC: 137 MMOL/L (ref 136–145)
TRIGL SERPL-MCNC: 91 MG/DL
TSH SERPL DL<=0.05 MIU/L-ACNC: 2 UIU/ML (ref 0.36–3.74)
VLDLC SERPL CALC-MCNC: 18.2 MG/DL
WBC # BLD AUTO: 13.2 K/UL (ref 3.6–11)

## 2024-07-08 DIAGNOSIS — N17.9 AKI (ACUTE KIDNEY INJURY) (HCC): Primary | ICD-10-CM

## 2024-07-08 DIAGNOSIS — G62.9 NEUROPATHY: ICD-10-CM

## 2024-07-08 DIAGNOSIS — N17.9 AKI (ACUTE KIDNEY INJURY) (HCC): ICD-10-CM

## 2024-07-08 NOTE — PROGRESS NOTES
I am concerned about gradual progression of cr up  Advised increased fluids  Advised repeat cmp in 1 month  Advised start b12 1000 mcg qd and we will check labs in 1 month at [facility]

## 2024-07-16 LAB
ALBUMIN SERPL-MCNC: 4.3 G/DL (ref 3.7–4.7)
ALP SERPL-CCNC: 126 IU/L (ref 44–121)
ALT SERPL-CCNC: 23 IU/L (ref 0–32)
AST SERPL-CCNC: 27 IU/L (ref 0–40)
BILIRUB SERPL-MCNC: 0.3 MG/DL (ref 0–1.2)
BUN SERPL-MCNC: 27 MG/DL (ref 8–27)
BUN/CREAT SERPL: 26 (ref 12–28)
CALCIUM SERPL-MCNC: 9.4 MG/DL (ref 8.7–10.3)
CHLORIDE SERPL-SCNC: 100 MMOL/L (ref 96–106)
CO2 SERPL-SCNC: 22 MMOL/L (ref 20–29)
CREAT SERPL-MCNC: 1.05 MG/DL (ref 0.57–1)
EGFRCR SERPLBLD CKD-EPI 2021: 53 ML/MIN/1.73
GLOBULIN SER CALC-MCNC: 2.7 G/DL (ref 1.5–4.5)
GLUCOSE SERPL-MCNC: 80 MG/DL (ref 70–99)
POTASSIUM SERPL-SCNC: 4.9 MMOL/L (ref 3.5–5.2)
PROT SERPL-MCNC: 7 G/DL (ref 6–8.5)
REPORT: NORMAL
SODIUM SERPL-SCNC: 137 MMOL/L (ref 134–144)
VIT B12 SERPL-MCNC: 561 PG/ML (ref 232–1245)

## 2024-07-30 ENCOUNTER — OFFICE VISIT (OUTPATIENT)
Age: 84
End: 2024-07-30
Payer: MEDICARE

## 2024-07-30 VITALS
RESPIRATION RATE: 16 BRPM | BODY MASS INDEX: 17.16 KG/M2 | HEART RATE: 68 BPM | TEMPERATURE: 97.5 F | DIASTOLIC BLOOD PRESSURE: 66 MMHG | WEIGHT: 103 LBS | HEIGHT: 65 IN | OXYGEN SATURATION: 99 % | SYSTOLIC BLOOD PRESSURE: 136 MMHG

## 2024-07-30 DIAGNOSIS — A31.0 PULMONARY MAI (MYCOBACTERIUM AVIUM-INTRACELLULARE) INFECTION (HCC): ICD-10-CM

## 2024-07-30 DIAGNOSIS — I10 HYPERTENSION, ESSENTIAL, BENIGN: ICD-10-CM

## 2024-07-30 DIAGNOSIS — R21 RASH OF BODY: Primary | ICD-10-CM

## 2024-07-30 PROCEDURE — 99213 OFFICE O/P EST LOW 20 MIN: CPT | Performed by: NURSE PRACTITIONER

## 2024-07-30 PROCEDURE — G8427 DOCREV CUR MEDS BY ELIG CLIN: HCPCS | Performed by: NURSE PRACTITIONER

## 2024-07-30 PROCEDURE — 1123F ACP DISCUSS/DSCN MKR DOCD: CPT | Performed by: NURSE PRACTITIONER

## 2024-07-30 PROCEDURE — G8419 CALC BMI OUT NRM PARAM NOF/U: HCPCS | Performed by: NURSE PRACTITIONER

## 2024-07-30 PROCEDURE — 1036F TOBACCO NON-USER: CPT | Performed by: NURSE PRACTITIONER

## 2024-07-30 PROCEDURE — 3075F SYST BP GE 130 - 139MM HG: CPT | Performed by: NURSE PRACTITIONER

## 2024-07-30 PROCEDURE — G8399 PT W/DXA RESULTS DOCUMENT: HCPCS | Performed by: NURSE PRACTITIONER

## 2024-07-30 PROCEDURE — 1090F PRES/ABSN URINE INCON ASSESS: CPT | Performed by: NURSE PRACTITIONER

## 2024-07-30 PROCEDURE — 3078F DIAST BP <80 MM HG: CPT | Performed by: NURSE PRACTITIONER

## 2024-07-30 RX ORDER — CETIRIZINE HYDROCHLORIDE 5 MG/1
5 TABLET ORAL DAILY
COMMUNITY
Start: 2024-07-24 | End: 2025-07-24

## 2024-07-30 ASSESSMENT — ENCOUNTER SYMPTOMS
BLOOD IN STOOL: 0
SINUS PAIN: 0
EYE REDNESS: 0
CHEST TIGHTNESS: 0
DIARRHEA: 0
BACK PAIN: 0
VOMITING: 0
EYES NEGATIVE: 1
RHINORRHEA: 0
GASTROINTESTINAL NEGATIVE: 1
EYE PAIN: 0
SINUS PRESSURE: 0
RESPIRATORY NEGATIVE: 1
COUGH: 0
NAUSEA: 0
SHORTNESS OF BREATH: 0
CONSTIPATION: 0
ABDOMINAL PAIN: 0

## 2024-07-30 NOTE — PROGRESS NOTES
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   Procedure Laterality Date    BREAST LUMPECTOMY Right     CARDIOVERSION EXTERNAL  9/18/2013         COLONOSCOPY      8/09 neg    COLONOSCOPY      GI      colon resection    GI      hemorrhoidectomy    ORTHOPEDIC SURGERY      M[facility]ons neuroma left foot    PELVIC LAPAROSCOPY      ovarian cystectomy    TONSILLECTOMY      UPPER GASTROINTESTINAL ENDOSCOPY        Social History     Tobacco Use    Smoking status: Never    Smokeless tobacco: Never   Substance Use Topics    Alcohol use: Not Currently     Alcohol/week: 1.7 standard drinks of alcohol      Family History   Problem Relation Age of Onset    Stroke Father     Stroke Brother         Physical Exam   Vitals:       /66 (Site: Left Upper Arm, Position: Sitting, Cuff Size: Small Adult)   Pulse 68   Temp 97.5 °F (36.4 °C) (Oral)   Resp 16   Ht 1.651 m (5' 5\")   Wt 46.7 kg (103 lb)   LMP  (LMP Unknown)   SpO2 99%   BMI 17.14 kg/m²      Physical Exam  Vitals reviewed.   Constitutional:       General: She is not in acute distress.     Appearance: Normal appearance. She is not ill-appearing.   Cardiovascular:      Rate and Rhythm: Rhythm irregular.      Heart sounds: No murmur heard.  Pulmonary:      Effort: Pulmonary effort is normal.      Breath sounds: Normal breath sounds. No wheezing or rales.   Musculoskeletal:      Right lower leg: No edema.      Left lower leg: No edema.   Skin:     General: Skin is warm and dry.      Findings: Rash present.             Comments: A few, scattered macular, erythematous rash noted to bilateral forearms.    Neurological:      Mental Status: She is alert.   Psychiatric:         Mood and Affect: Mood
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
## 2024-10-06 DIAGNOSIS — I10 ESSENTIAL (PRIMARY) HYPERTENSION: ICD-10-CM

## 2024-10-07 RX ORDER — VALSARTAN 320 MG/1
320 TABLET ORAL DAILY
Qty: 90 TABLET | Refills: 1 | Status: SHIPPED | OUTPATIENT
Start: 2024-10-07

## 2024-10-19 DIAGNOSIS — I10 ESSENTIAL (PRIMARY) HYPERTENSION: ICD-10-CM

## 2024-10-21 RX ORDER — AMLODIPINE BESYLATE 5 MG/1
TABLET ORAL
Qty: 90 TABLET | Refills: 1 | Status: SHIPPED | OUTPATIENT
Start: 2024-10-21

## 2024-12-10 ENCOUNTER — OFFICE VISIT (OUTPATIENT)
Facility: CLINIC | Age: 84
End: 2024-12-10
Payer: MEDICARE

## 2024-12-10 VITALS
DIASTOLIC BLOOD PRESSURE: 75 MMHG | WEIGHT: 112 LBS | BODY MASS INDEX: 18.66 KG/M2 | RESPIRATION RATE: 16 BRPM | SYSTOLIC BLOOD PRESSURE: 124 MMHG | HEIGHT: 65 IN | HEART RATE: 73 BPM | OXYGEN SATURATION: 94 % | TEMPERATURE: 98.1 F

## 2024-12-10 DIAGNOSIS — I50.20 SYSTOLIC HEART FAILURE, UNSPECIFIED HF CHRONICITY (HCC): ICD-10-CM

## 2024-12-10 DIAGNOSIS — E78.5 DYSLIPIDEMIA, GOAL LDL BELOW 100: ICD-10-CM

## 2024-12-10 DIAGNOSIS — R53.1 WEAKNESS: ICD-10-CM

## 2024-12-10 DIAGNOSIS — I48.0 PAROXYSMAL ATRIAL FIBRILLATION (HCC): ICD-10-CM

## 2024-12-10 DIAGNOSIS — Z79.01 CHRONIC ANTICOAGULATION: ICD-10-CM

## 2024-12-10 DIAGNOSIS — I10 HTN, GOAL BELOW 130/80: Primary | ICD-10-CM

## 2024-12-10 DIAGNOSIS — J47.9 BRONCHIECTASIS WITHOUT COMPLICATION (HCC): ICD-10-CM

## 2024-12-10 PROCEDURE — 1159F MED LIST DOCD IN RCRD: CPT | Performed by: INTERNAL MEDICINE

## 2024-12-10 PROCEDURE — 1036F TOBACCO NON-USER: CPT | Performed by: INTERNAL MEDICINE

## 2024-12-10 PROCEDURE — G8420 CALC BMI NORM PARAMETERS: HCPCS | Performed by: INTERNAL MEDICINE

## 2024-12-10 PROCEDURE — G8484 FLU IMMUNIZE NO ADMIN: HCPCS | Performed by: INTERNAL MEDICINE

## 2024-12-10 PROCEDURE — 1090F PRES/ABSN URINE INCON ASSESS: CPT | Performed by: INTERNAL MEDICINE

## 2024-12-10 PROCEDURE — 1160F RVW MEDS BY RX/DR IN RCRD: CPT | Performed by: INTERNAL MEDICINE

## 2024-12-10 PROCEDURE — G8399 PT W/DXA RESULTS DOCUMENT: HCPCS | Performed by: INTERNAL MEDICINE

## 2024-12-10 PROCEDURE — 1126F AMNT PAIN NOTED NONE PRSNT: CPT | Performed by: INTERNAL MEDICINE

## 2024-12-10 PROCEDURE — G8427 DOCREV CUR MEDS BY ELIG CLIN: HCPCS | Performed by: INTERNAL MEDICINE

## 2024-12-10 PROCEDURE — 1123F ACP DISCUSS/DSCN MKR DOCD: CPT | Performed by: INTERNAL MEDICINE

## 2024-12-10 PROCEDURE — 99214 OFFICE O/P EST MOD 30 MIN: CPT | Performed by: INTERNAL MEDICINE

## 2024-12-10 PROCEDURE — 3078F DIAST BP <80 MM HG: CPT | Performed by: INTERNAL MEDICINE

## 2024-12-10 PROCEDURE — 3074F SYST BP LT 130 MM HG: CPT | Performed by: INTERNAL MEDICINE

## 2024-12-10 NOTE — PROGRESS NOTES
Eloise Phillips (:  1940) is a 84 y.o. female,Established patient, here for evaluation of the following chief complaint(s):  Hypertension (6 mo f/u;)         Assessment & Plan  HTN, goal below 130/80  At goal continue amlodipine 5 mg valsartan 320 mg.         Paroxysmal atrial fibrillation (HCC)  On low-dose Eliquis and amiodarone.  Has some bruising.  Considered Watchman procedure but has decided against any kind of intervention now no bleeding         Bronchiectasis without complication (HCC)  Chronic productive cough no recent acute bronchitis does see pulmonary regularly         Chronic anticoagulation  Continues on Eliquis 2.5 twice daily endorses bruising but no bleeding         Systolic heart failure, unspecified HF chronicity (HCC)  Currently euvolemic         Weakness  Since she is retired less lower extremity exercise and feels generally weaker would like physical therapy and I am putting in a referral    Orders:  •  External Referral To Physical Therapy  •  CBC; Future    Dyslipidemia, goal LDL below 100  Continue atorvastatin 5 mg    Orders:  •  Comprehensive Metabolic Panel; Future  •  Lipid Panel; Future      No follow-ups on file.       Subjective   Hypertension  In the last 6 months she did have a presyncopal spell was at a  it was very hot she was aware that she was getting lightheaded and was able to sit down without hurting her head.  Believes she was dehydrated.  Has not had any further spells.  Does see Dr. Metzger from cardiology with paroxysmal A-fib.  Has not had evidence of tachycardia.  Does have bronchiectasis but has not had any recent need for antibiotics.  Describes her mucus production is stable persistent off-white in color no blood.  Does remain on low-dose Eliquis and bruises easily.  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   HENT:      Head:

## 2024-12-10 NOTE — ASSESSMENT & PLAN NOTE
On low-dose Eliquis and amiodarone.  Has some bruising.  Considered Watchman procedure but has decided against any kind of intervention now no bleeding

## 2024-12-11 LAB
ALBUMIN SERPL-MCNC: 3.9 G/DL (ref 3.5–5)
ALBUMIN/GLOB SERPL: 1.2 (ref 1.1–2.2)
ALP SERPL-CCNC: 108 U/L (ref 45–117)
ALT SERPL-CCNC: 27 U/L (ref 12–78)
ANION GAP SERPL CALC-SCNC: 6 MMOL/L (ref 2–12)
AST SERPL-CCNC: 23 U/L (ref 15–37)
BILIRUB SERPL-MCNC: 0.4 MG/DL (ref 0.2–1)
BUN SERPL-MCNC: 29 MG/DL (ref 6–20)
BUN/CREAT SERPL: 25 (ref 12–20)
CALCIUM SERPL-MCNC: 8.4 MG/DL (ref 8.5–10.1)
CHLORIDE SERPL-SCNC: 106 MMOL/L (ref 97–108)
CHOLEST SERPL-MCNC: 176 MG/DL
CO2 SERPL-SCNC: 29 MMOL/L (ref 21–32)
CREAT SERPL-MCNC: 1.15 MG/DL (ref 0.55–1.02)
ERYTHROCYTE [DISTWIDTH] IN BLOOD BY AUTOMATED COUNT: 13.1 % (ref 11.5–14.5)
GLOBULIN SER CALC-MCNC: 3.3 G/DL (ref 2–4)
GLUCOSE SERPL-MCNC: 77 MG/DL (ref 65–100)
HCT VFR BLD AUTO: 37.2 % (ref 35–47)
HDLC SERPL-MCNC: 76 MG/DL
HDLC SERPL: 2.3 (ref 0–5)
HGB BLD-MCNC: 11.7 G/DL (ref 11.5–16)
LDLC SERPL CALC-MCNC: 77.2 MG/DL (ref 0–100)
MCH RBC QN AUTO: 30.4 PG (ref 26–34)
MCHC RBC AUTO-ENTMCNC: 31.5 G/DL (ref 30–36.5)
MCV RBC AUTO: 96.6 FL (ref 80–99)
NRBC # BLD: 0 K/UL (ref 0–0.01)
NRBC BLD-RTO: 0 PER 100 WBC
PLATELET # BLD AUTO: 253 K/UL (ref 150–400)
PMV BLD AUTO: 10.2 FL (ref 8.9–12.9)
POTASSIUM SERPL-SCNC: 4.5 MMOL/L (ref 3.5–5.1)
PROT SERPL-MCNC: 7.2 G/DL (ref 6.4–8.2)
RBC # BLD AUTO: 3.85 M/UL (ref 3.8–5.2)
SODIUM SERPL-SCNC: 141 MMOL/L (ref 136–145)
TRIGL SERPL-MCNC: 114 MG/DL
VLDLC SERPL CALC-MCNC: 22.8 MG/DL
WBC # BLD AUTO: 10.7 K/UL (ref 3.6–11)

## 2024-12-19 ENCOUNTER — HOSPITAL ENCOUNTER (OUTPATIENT)
Facility: HOSPITAL | Age: 84
Setting detail: RECURRING SERIES
Discharge: HOME | End: 2024-12-22
Payer: MEDICARE

## 2024-12-19 PROCEDURE — 97110 THERAPEUTIC EXERCISES: CPT | Performed by: PHYSICAL THERAPIST

## 2024-12-19 PROCEDURE — 97112 NEUROMUSCULAR REEDUCATION: CPT | Performed by: PHYSICAL THERAPIST

## 2024-12-19 PROCEDURE — 97162 PT EVAL MOD COMPLEX 30 MIN: CPT | Performed by: PHYSICAL THERAPIST

## 2024-12-19 NOTE — THERAPY EVALUATION
Physical Therapy at [place],   a part of [facility]  [address], [address]  [place] [address]  Phone: [phone]  Fax: [phone]       PHYSICAL THERAPY - MEDICARE EVALUATION/PLAN OF CARE NOTE (updated 3/23)      Date: 2024          Patient Name:  Eloise Phillips :  1940   Medical   Diagnosis:  Weakness [R53.1] Treatment Diagnosis:  M62.81  GENERAL MUSCLE WEAKNESS    Referral Source:  Criss Bautista MD Provider #:  [id number]                Insurance: Payor: MEDICARE / Plan: MEDICARE PART A AND B / Product Type: *No Product type* /      Patient  verified yes     Visit #   Current  / Total 1 24   Time   In / Out 2:30 PM 3:30 PM   Total Treatment Time 60   Total Timed Codes 40   1:1 Treatment Time 40    [facility] Totals Reminder:  bill using total billable   min of TIMED therapeutic procedures and modalities.   8-22 min = 1 unit; 23-37 min = 2 units; 38-52 min = 3 units;  53-67 min = 4 units; 68-82 min = 5 units           SUBJECTIVE  Pain Level (0-10 scale): 3  []constant []intermittent []improving []worsening []no change since onset    Any medication changes, allergies to medications, adverse drug reactions, diagnosis change, or new procedure performed?: [x] No    [] Yes (see summary sheet for update)  Medications: Verified on Patient Summary List    Subjective functional status/changes:     Chronic upper back, shoulder pain, generalized muscle weakness, increased fall risk    Start of Care: 2024  Onset Date: Over the past year  Current symptoms/Complaints: Poor endurance, unsteady on uneven surfaces. Upper back pain related to fall last year.  Mechanism of Injury: Patient was attempting to lift a heavy box and the box fell on her as she fell to the floor. She sustained a muscular injury to her upper back and it causes her to fatigue while stanidng.  PLOF: No regular exercise program, but would like to return to one  Limitations to

## 2024-12-30 ENCOUNTER — TELEPHONE (OUTPATIENT)
Facility: CLINIC | Age: 84
End: 2024-12-30

## 2024-12-30 NOTE — TELEPHONE ENCOUNTER
FYI: PSR attempted to reach out to patient two times, cell seems to be turned off - left detailed VM that appointment can be scheduled today at 1145 AM [to be here at 1140 AM] did not schedule at this time. If patient returns call please assist at scheduling in 1145 AM visit today December 30 with PCP.

## 2024-12-30 NOTE — TELEPHONE ENCOUNTER
Unable to call in eye drops without a visit. Can be seen today at 11:45. Please ask her to arrive at 11:40 AM for triage. Thank you.

## 2024-12-30 NOTE — TELEPHONE ENCOUNTER
Patient calling to ask provider if she could call her in some eye drops for possible conjunctivitis - patient believes she has had this before, states she has been using \"Stye\" OTC to see if this helps, but not clearing up.

## 2024-12-31 ENCOUNTER — OFFICE VISIT (OUTPATIENT)
Facility: CLINIC | Age: 84
End: 2024-12-31
Payer: MEDICARE

## 2024-12-31 VITALS
OXYGEN SATURATION: 98 % | SYSTOLIC BLOOD PRESSURE: 120 MMHG | WEIGHT: 109.4 LBS | RESPIRATION RATE: 16 BRPM | BODY MASS INDEX: 18.23 KG/M2 | HEART RATE: 62 BPM | DIASTOLIC BLOOD PRESSURE: 70 MMHG | TEMPERATURE: 97.6 F | HEIGHT: 65 IN

## 2024-12-31 DIAGNOSIS — H10.023 PINK EYE DISEASE OF BOTH EYES: Primary | ICD-10-CM

## 2024-12-31 PROCEDURE — 1090F PRES/ABSN URINE INCON ASSESS: CPT | Performed by: NURSE PRACTITIONER

## 2024-12-31 PROCEDURE — 99213 OFFICE O/P EST LOW 20 MIN: CPT | Performed by: NURSE PRACTITIONER

## 2024-12-31 PROCEDURE — G8419 CALC BMI OUT NRM PARAM NOF/U: HCPCS | Performed by: NURSE PRACTITIONER

## 2024-12-31 PROCEDURE — 1036F TOBACCO NON-USER: CPT | Performed by: NURSE PRACTITIONER

## 2024-12-31 PROCEDURE — 1160F RVW MEDS BY RX/DR IN RCRD: CPT | Performed by: NURSE PRACTITIONER

## 2024-12-31 PROCEDURE — G8399 PT W/DXA RESULTS DOCUMENT: HCPCS | Performed by: NURSE PRACTITIONER

## 2024-12-31 PROCEDURE — 3078F DIAST BP <80 MM HG: CPT | Performed by: NURSE PRACTITIONER

## 2024-12-31 PROCEDURE — 1159F MED LIST DOCD IN RCRD: CPT | Performed by: NURSE PRACTITIONER

## 2024-12-31 PROCEDURE — G8484 FLU IMMUNIZE NO ADMIN: HCPCS | Performed by: NURSE PRACTITIONER

## 2024-12-31 PROCEDURE — G8427 DOCREV CUR MEDS BY ELIG CLIN: HCPCS | Performed by: NURSE PRACTITIONER

## 2024-12-31 PROCEDURE — 1123F ACP DISCUSS/DSCN MKR DOCD: CPT | Performed by: NURSE PRACTITIONER

## 2024-12-31 PROCEDURE — 3074F SYST BP LT 130 MM HG: CPT | Performed by: NURSE PRACTITIONER

## 2024-12-31 RX ORDER — ERYTHROMYCIN 5 MG/G
OINTMENT OPHTHALMIC
Qty: 3.5 G | Refills: 0 | Status: SHIPPED | OUTPATIENT
Start: 2024-12-31

## 2024-12-31 ASSESSMENT — ENCOUNTER SYMPTOMS
BACK PAIN: 0
VOMITING: 0
NAUSEA: 0
RESPIRATORY NEGATIVE: 1
RHINORRHEA: 0
SINUS PRESSURE: 0
BLOOD IN STOOL: 0
SHORTNESS OF BREATH: 0
EYE REDNESS: 1
DIARRHEA: 0
EYE DISCHARGE: 1
CHEST TIGHTNESS: 0
SINUS PAIN: 0
CONSTIPATION: 0
GASTROINTESTINAL NEGATIVE: 1
EYE PAIN: 0
EYE ITCHING: 1
COUGH: 0
ABDOMINAL PAIN: 0

## 2024-12-31 NOTE — PROGRESS NOTES
Neurological: Negative.  Negative for dizziness, light-headedness and headaches.   Psychiatric/Behavioral: Negative.  Negative for agitation, behavioral problems, sleep disturbance and suicidal ideas. The patient is not nervous/anxious.      Past Medical History     Allergies   Allergen Reactions    Ciprofloxacin Hives    Doxycycline Nausea Only      Current Outpatient Medications   Medication Sig    erythromycin (ROMYCIN) 5 MG/GM ophthalmic ointment Instill ~1 cm ribbon into affected eye(s) 4 times daily for 7 days (Ref)    amLODIPine (NORVASC) 5 MG tablet TAKE 1 TABLET BY MOUTH EVERY DAY IN THE MORNING    valsartan (DIOVAN) 320 MG tablet TAKE 1 TABLET BY MOUTH DAILY    cetirizine (ZYRTEC) 5 MG tablet Take 1 tablet by mouth daily    atorvastatin (LIPITOR) 10 MG tablet TAKE 1/2 TABLET BY MOUTH EVERY DAY    acetaminophen (TYLENOL) 500 MG tablet Take by mouth as needed    amiodarone (CORDARONE) 200 MG tablet Take 1 tablet by mouth daily    apixaban (ELIQUIS) 2.5 MG TABS tablet Take by mouth 2 times daily    fluticasone (FLONASE) 50 MCG/ACT nasal spray 2 sprays by Nasal route daily     No current facility-administered medications for this visit.      Patient Active Problem List   Diagnosis    Rectal polyp    Hypertension, essential, benign    Mixed hyperlipidemia    Osteoporosis    Allergic rhinitis    Kidney lesion    Atypical mycobacterial disease    Dyspnea on exertion    Paroxysmal atrial fibrillation (HCC)    Hyperkalemia    Advanced care planning/counseling discussion    DNR (do not resuscitate)    Lung nodule    PVC (premature ventricular contraction)    Fatigue    Systolic heart failure, 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

## 2025-01-06 ENCOUNTER — HOSPITAL ENCOUNTER (OUTPATIENT)
Facility: HOSPITAL | Age: 85
Setting detail: RECURRING SERIES
End: 2025-01-06
Payer: MEDICARE

## 2025-01-18 DIAGNOSIS — E78.5 HYPERLIPIDEMIA, UNSPECIFIED: ICD-10-CM

## 2025-01-19 RX ORDER — ATORVASTATIN CALCIUM 10 MG/1
5 TABLET, FILM COATED ORAL DAILY
Qty: 45 TABLET | Refills: 3 | Status: SHIPPED | OUTPATIENT
Start: 2025-01-19

## 2025-02-03 ENCOUNTER — HOSPITAL ENCOUNTER (OUTPATIENT)
Facility: HOSPITAL | Age: 85
Setting detail: RECURRING SERIES
Discharge: HOME | End: 2025-02-06
Payer: MEDICARE

## 2025-02-03 PROCEDURE — 97110 THERAPEUTIC EXERCISES: CPT | Performed by: PHYSICAL THERAPIST

## 2025-02-03 PROCEDURE — 97112 NEUROMUSCULAR REEDUCATION: CPT | Performed by: PHYSICAL THERAPIST

## 2025-02-03 NOTE — PROGRESS NOTES
Physical Therapy at [place],   a part of [facility]  [address], [address]  [place] [address]  Phone: [phone]  Fax: [phone]  PHYSICAL THERAPY PROGRESS NOTE  Patient Name:  Eloise Phillips :  1940   Treatment/Medical Diagnosis: Weakness [R53.1]   Referral Source:  Criss Bautista MD     Date of Initial Visit:  2/3/24 Attended Visits:  1 Missed Visits:  Multiple cancellations due to weather     SUMMARY OF TREATMENT/ASSESSMENT:  Therapeutic and neuromuscular reeducation exercises to address generalized weakness and an increased fall risk following hopsitaliation in .    CURRENT STATUS/GOALS  Patient has only attended 1 PT visit due to concerns over the weather. No significant functional changes were reported on today's visit, but she tolerated the exercise program well.    Short Term Goals: To be accomplished in 12 treatments.  Pt will demo independence with HEP with no v.c. to allow for toileting transfers without assistance needed  NOT MET  Pt will demo standing tolerance to 10 min with 2/10 pn and no v.c. for form correction  NOT MET  Pt will demo ambulation x 250 ft with min antalgia and no LOB with independent level of assistance and no AD   NOT MET  Pt will transfer sit to stand without UE assistance to improve functional mobility in the home  NOT MET      RECOMMENDATIONS FOR SKILLED THERAPY  Patient will continue to benefit from PT under the current plan of care for an additional 4 weeks to reach all functional goals.        Ryan Marshall, PT       2/3/2025       1:56 PM    If you have any questions/comments please contact us directly at [phone].   Thank you for allowing us to assist in the care of your patient.

## 2025-02-03 NOTE — PROGRESS NOTES
PHYSICAL THERAPY - MEDICARE DAILY TREATMENT NOTE (updated 3/23)      Date: 2/3/2025          Patient Name:  Eloise Phillips :  1940   Medical   Diagnosis:  Weakness [R53.1] Treatment Diagnosis:  M62.81  GENERAL MUSCLE WEAKNESS    Referral Source:  Criss Bautista MD Insurance:   Payor: MEDICARE / Plan: MEDICARE PART A AND B / Product Type: *No Product type* /                     Patient  verified yes     Visit #   Current  / Total 2 24   Time   In / Out 1:40 PM 2:20 PM   Total Treatment Time 40   Total Timed Codes 40   1:1 Treatment Time 40      [facility] Totals Reminder:  bill using total billable   min of TIMED therapeutic procedures and modalities.   8-22 min = 1 unit; 23-37 min = 2 units; 38-52 min = 3 units; 53-67 min = 4 units; 68-82 min = 5 units            SUBJECTIVE    Pain Level (0-10 scale): 0    Any medication changes, allergies to medications, adverse drug reactions, diagnosis change, or new procedure performed?: [x] No    [] Yes (see summary sheet for update)  Medications: Verified on Patient Summary List    Subjective functional status/changes:     Patient strained her hip several weeks ago, but it is doing better. She has missed several appointments due to the weather.    OBJECTIVE     Therapeutic Procedures:  Tx Min Billable or 1:1 Min (if diff from Tx Min) Procedure, Rationale, Specifics   25   00304 Therapeutic Exercise (timed):  increase ROM, strength, coordination, balance, and proprioception to improve patient's ability to progress to PLOF and address remaining functional goals. (see flow sheet as applicable)     Details if applicable:     15   20914 Neuromuscular Re-Education (timed):  improve balance, coordination, kinesthetic sense, posture, core stability and proprioception to improve patient's ability to develop conscious control of individual muscles and awareness of position of extremities in order to progress to PLOF and address remaining functional goals. (see flow sheet as

## 2025-02-10 ENCOUNTER — HOSPITAL ENCOUNTER (OUTPATIENT)
Facility: HOSPITAL | Age: 85
Setting detail: RECURRING SERIES
Discharge: HOME | End: 2025-02-13
Payer: MEDICARE

## 2025-02-10 PROCEDURE — 97110 THERAPEUTIC EXERCISES: CPT | Performed by: PHYSICAL THERAPIST

## 2025-02-10 PROCEDURE — 97112 NEUROMUSCULAR REEDUCATION: CPT | Performed by: PHYSICAL THERAPIST

## 2025-02-10 NOTE — PROGRESS NOTES
PHYSICAL THERAPY - MEDICARE DAILY TREATMENT NOTE (updated 3/23)      Date: 2/10/2025          Patient Name:  Eloise Phillips :  1940   Medical   Diagnosis:  Weakness [R53.1] Treatment Diagnosis:  M62.81  GENERAL MUSCLE WEAKNESS    Referral Source:  Criss Bautista MD Insurance:   Payor: MEDICARE / Plan: MEDICARE PART A AND B / Product Type: *No Product type* /                     Patient  verified yes     Visit #   Current  / Total 4 24   Time   In / Out 2:30 PM 3:10 PM   Total Treatment Time 40   Total Timed Codes 40   1:1 Treatment Time 40      [facility] Totals Reminder:  bill using total billable   min of TIMED therapeutic procedures and modalities.   8-22 min = 1 unit; 23-37 min = 2 units; 38-52 min = 3 units; 53-67 min = 4 units; 68-82 min = 5 units            SUBJECTIVE    Pain Level (0-10 scale): 0    Any medication changes, allergies to medications, adverse drug reactions, diagnosis change, or new procedure performed?: [x] No    [] Yes (see summary sheet for update)  Medications: Verified on Patient Summary List    Subjective functional status/changes:     Patient is feeling very fatigued and winded today, but will try her best with PT.    OBJECTIVE     Therapeutic Procedures:  Tx Min Billable or 1:1 Min (if diff from Tx Min) Procedure, Rationale, Specifics   25   03662 Therapeutic Exercise (timed):  increase ROM, strength, coordination, balance, and proprioception to improve patient's ability to progress to PLOF and address remaining functional goals. (see flow sheet as applicable)     Details if applicable:     15   03006 Neuromuscular Re-Education (timed):  improve balance, coordination, kinesthetic sense, posture, core stability and proprioception to improve patient's ability to develop conscious control of individual muscles and awareness of position of extremities in order to progress to PLOF and address remaining functional goals. (see flow sheet as applicable)     Details if applicable:

## 2025-02-24 ENCOUNTER — HOSPITAL ENCOUNTER (OUTPATIENT)
Facility: HOSPITAL | Age: 85
Setting detail: RECURRING SERIES
Discharge: HOME | End: 2025-02-27
Payer: MEDICARE

## 2025-02-24 PROCEDURE — 97112 NEUROMUSCULAR REEDUCATION: CPT | Performed by: PHYSICAL THERAPIST

## 2025-02-24 PROCEDURE — 97110 THERAPEUTIC EXERCISES: CPT | Performed by: PHYSICAL THERAPIST

## 2025-02-24 NOTE — PROGRESS NOTES
PHYSICAL THERAPY - MEDICARE DAILY TREATMENT NOTE (updated 3/23)      Date: 2025          Patient Name:  Eloise Phillips :  1940   Medical   Diagnosis:  Weakness [R53.1] Treatment Diagnosis:  M62.81  GENERAL MUSCLE WEAKNESS    Referral Source:  Criss Bautista MD Insurance:   Payor: MEDICARE / Plan: MEDICARE PART A AND B / Product Type: *No Product type* /                     Patient  verified yes     Visit #   Current  / Total 4 24   Time   In / Out 1:45 PM 2:25 PM   Total Treatment Time 40   Total Timed Codes 40   1:1 Treatment Time 40      [facility] Totals Reminder:  bill using total billable   min of TIMED therapeutic procedures and modalities.   8-22 min = 1 unit; 23-37 min = 2 units; 38-52 min = 3 units; 53-67 min = 4 units; 68-82 min = 5 units            SUBJECTIVE    Pain Level (0-10 scale): 0    Any medication changes, allergies to medications, adverse drug reactions, diagnosis change, or new procedure performed?: [x] No    [] Yes (see summary sheet for update)  Medications: Verified on Patient Summary List    Subjective functional status/changes:     Patient has increased soreness through the R quadriceps for no apparent reason today. She will try to participate in all of the exercises today, but is unsure how the LE will respond.    OBJECTIVE     Therapeutic Procedures:  Tx Min Billable or 1:1 Min (if diff from Tx Min) Procedure, Rationale, Specifics   25   06819 Therapeutic Exercise (timed):  increase ROM, strength, coordination, balance, and proprioception to improve patient's ability to progress to PLOF and address remaining functional goals. (see flow sheet as applicable)     Details if applicable:     15   99642 Neuromuscular Re-Education (timed):  improve balance, coordination, kinesthetic sense, posture, core stability and proprioception to improve patient's ability to develop conscious control of individual muscles and awareness of position of extremities in order to progress to

## 2025-03-06 ENCOUNTER — APPOINTMENT (OUTPATIENT)
Facility: HOSPITAL | Age: 85
End: 2025-03-06
Payer: MEDICARE

## 2025-03-11 ENCOUNTER — HOSPITAL ENCOUNTER (OUTPATIENT)
Facility: HOSPITAL | Age: 85
Setting detail: RECURRING SERIES
Discharge: HOME | End: 2025-03-14
Payer: MEDICARE

## 2025-03-11 PROCEDURE — 97112 NEUROMUSCULAR REEDUCATION: CPT | Performed by: PHYSICAL THERAPIST

## 2025-03-11 PROCEDURE — 97110 THERAPEUTIC EXERCISES: CPT | Performed by: PHYSICAL THERAPIST

## 2025-03-11 NOTE — PROGRESS NOTES
PHYSICAL THERAPY - MEDICARE DAILY TREATMENT NOTE (updated 3/23)      Date: 3/11/2025          Patient Name:  Eloise Phillips :  1940   Medical   Diagnosis:  Weakness [R53.1] Treatment Diagnosis:  M62.81  GENERAL MUSCLE WEAKNESS    Referral Source:  Criss Bautista MD Insurance:   Payor: MEDICARE / Plan: MEDICARE PART A AND B / Product Type: *No Product type* /                     Patient  verified yes     Visit #   Current  / Total 5 24   Time   In / Out 1:45 PM 2:25 PM   Total Treatment Time 40   Total Timed Codes 40   1:1 Treatment Time 40      [facility] Totals Reminder:  bill using total billable   min of TIMED therapeutic procedures and modalities.   8-22 min = 1 unit; 23-37 min = 2 units; 38-52 min = 3 units; 53-67 min = 4 units; 68-82 min = 5 units            SUBJECTIVE    Pain Level (0-10 scale): 0    Any medication changes, allergies to medications, adverse drug reactions, diagnosis change, or new procedure performed?: [x] No    [] Yes (see summary sheet for update)  Medications: Verified on Patient Summary List    Subjective functional status/changes:     Patient reports that she was ill the previous week and missed her PT visits. She would like to hold off on the recumbent bike as it pulled a muscle in her R thigh on the last visit.    OBJECTIVE     Therapeutic Procedures:  Tx Min Billable or 1:1 Min (if diff from Tx Min) Procedure, Rationale, Specifics   25   95797 Therapeutic Exercise (timed):  increase ROM, strength, coordination, balance, and proprioception to improve patient's ability to progress to PLOF and address remaining functional goals. (see flow sheet as applicable)     Details if applicable:     15   30818 Neuromuscular Re-Education (timed):  improve balance, coordination, kinesthetic sense, posture, core stability and proprioception to improve patient's ability to develop conscious control of individual muscles and awareness of position of extremities in order to progress to PLOF

## 2025-03-11 NOTE — PROGRESS NOTES
Patient came to the ED via EMS. Patient states that she is feeling bad. Patient stated that she started feeling bad yesterday. Patient states that she is very short of breathe, nausea and vomiting and diarrhea yesterday. Patient has a history of COPD, HTN, DM II, CHF, etc.    Physical Therapy at [place],   a part of [facility]  [address], [address]  [place] [address]  Phone: [phone]  Fax: [phone]  PHYSICAL THERAPY PROGRESS NOTE  Patient Name:  Eloise Phillips :  1940   Treatment/Medical Diagnosis: Weakness [R53.1]   Referral Source:  Criss Bautista MD     Date of Initial Visit:  2/3/24 Attended Visits:  5 Missed Visits:  Multiple cancellations due to weather     SUMMARY OF TREATMENT/ASSESSMENT:  Therapeutic and neuromuscular reeducation exercises to address generalized weakness and an increased fall risk following hopsitaliation in .    CURRENT STATUS/GOALS  Patient has now attended 4 PT visits since her last progress report and has begun to show improvements in strength, endurance, and balance. Her fall risk has reduced slightly and she is motivated to continue therapy for an additional 4 weeks to further reduce this risk for falls.    Short Term Goals: To be accomplished in 12 treatments.  Pt will demo independence with HEP with no v.c. to allow for toileting transfers without assistance needed  MET  Pt will demo standing tolerance to 10 min with 2/10 pn and no v.c. for form correction  MET  Pt will demo ambulation x 250 ft with min antalgia and no LOB with independent level of assistance and no AD   MET  Pt will transfer sit to stand without UE assistance to improve functional mobility in the home  MET    Long Term Goals: To be accomplished in 24 treatments.  Pt will demo independence with HEP at the time of D/C as well as LE and UE AROM WFL to allow for continued strength and endurance progression for independent level of assistance for ADL skills   Pt will demo 48/56 or higher on GARCIA balance test to indicate decreased risk of falls  Pt will demo amb x 500 ft with no pain and no LOB at the time of D/C  Pt will demo all ADL/IADL/work activities without compensation or assistance needed as evidenced by

## 2025-03-20 ENCOUNTER — HOSPITAL ENCOUNTER (OUTPATIENT)
Facility: HOSPITAL | Age: 85
Setting detail: RECURRING SERIES
Discharge: HOME | End: 2025-03-23
Payer: MEDICARE

## 2025-03-20 PROCEDURE — 97112 NEUROMUSCULAR REEDUCATION: CPT | Performed by: PHYSICAL THERAPIST

## 2025-03-20 PROCEDURE — 97110 THERAPEUTIC EXERCISES: CPT | Performed by: PHYSICAL THERAPIST

## 2025-03-20 NOTE — PROGRESS NOTES
PHYSICAL THERAPY - MEDICARE DAILY TREATMENT NOTE (updated 3/23)      Date: 3/20/2025          Patient Name:  Eloise Phillips :  1940   Medical   Diagnosis:  Weakness [R53.1] Treatment Diagnosis:  M62.81  GENERAL MUSCLE WEAKNESS    Referral Source:  Criss Bautista MD Insurance:   Payor: MEDICARE / Plan: MEDICARE PART A AND B / Product Type: *No Product type* /                     Patient  verified yes     Visit #   Current  / Total 5 24   Time   In / Out 1:45 PM 2:25 PM   Total Treatment Time 40   Total Timed Codes 40   1:1 Treatment Time 40      [facility] Totals Reminder:  bill using total billable   min of TIMED therapeutic procedures and modalities.   8-22 min = 1 unit; 23-37 min = 2 units; 38-52 min = 3 units; 53-67 min = 4 units; 68-82 min = 5 units            SUBJECTIVE    Pain Level (0-10 scale): 0    Any medication changes, allergies to medications, adverse drug reactions, diagnosis change, or new procedure performed?: [x] No    [] Yes (see summary sheet for update)  Medications: Verified on Patient Summary List    Subjective functional status/changes:     Patient is feeling stronger and has noticed her breathing has been doing better as well.    OBJECTIVE     Therapeutic Procedures:  Tx Min Billable or 1:1 Min (if diff from Tx Min) Procedure, Rationale, Specifics   25   32059 Therapeutic Exercise (timed):  increase ROM, strength, coordination, balance, and proprioception to improve patient's ability to progress to PLOF and address remaining functional goals. (see flow sheet as applicable)     Details if applicable:     15   08344 Neuromuscular Re-Education (timed):  improve balance, coordination, kinesthetic sense, posture, core stability and proprioception to improve patient's ability to develop conscious control of individual muscles and awareness of position of extremities in order to progress to PLOF and address remaining functional goals. (see flow sheet as applicable)     Details if

## 2025-03-31 ENCOUNTER — HOSPITAL ENCOUNTER (OUTPATIENT)
Facility: HOSPITAL | Age: 85
Setting detail: RECURRING SERIES
Discharge: HOME | End: 2025-04-03
Payer: MEDICARE

## 2025-03-31 PROCEDURE — 97112 NEUROMUSCULAR REEDUCATION: CPT

## 2025-03-31 PROCEDURE — 97110 THERAPEUTIC EXERCISES: CPT

## 2025-03-31 NOTE — PROGRESS NOTES
Total           [x]  Patient Education billed concurrently with other procedures   [x] Review HEP    [] Progressed/Changed HEP, detail:    [] Other detail:         Other Objective/Functional Measures    No LOB with walking marching and no significant deviations present    Frequent rest breaks needed, performed seated therex and stretches while resting      Pain Level at end of session (0-10 scale): 0      Assessment   Patient will continue to benefit from skilled PT / OT services to modify and progress therapeutic interventions, analyze and address functional mobility deficits, analyze and address ROM deficits, analyze and address strength deficits, analyze and address soft tissue restrictions, analyze and cue for proper movement patterns, analyze and modify for postural abnormalities, analyze and address imbalance/dizziness, and instruct in home and community integration to address functional deficits and attain remaining goals.    Progress toward goals / Updated goals:  []  See Progress Note/Recertification  Steady progress at this time towards long term balance goals.    PLAN  Yes  Continue plan of care  Re-Cert Due: 90 days  [x]  Upgrade activities as tolerated  []  Discharge due to:  []  Other:      VINAY THAPA, DILMA       3/31/2025       12:57 PM

## 2025-04-08 ENCOUNTER — HOSPITAL ENCOUNTER (OUTPATIENT)
Facility: HOSPITAL | Age: 85
Setting detail: RECURRING SERIES
Discharge: HOME | End: 2025-04-11
Payer: MEDICARE

## 2025-04-08 PROCEDURE — 97110 THERAPEUTIC EXERCISES: CPT | Performed by: PHYSICAL THERAPIST

## 2025-04-08 PROCEDURE — 97112 NEUROMUSCULAR REEDUCATION: CPT | Performed by: PHYSICAL THERAPIST

## 2025-04-08 NOTE — PROGRESS NOTES
PHYSICAL THERAPY - MEDICARE DAILY TREATMENT NOTE (updated 3/23)      Date: 2025          Patient Name:  Eloise Phillips :  1940   Medical   Diagnosis:  Weakness [R53.1] Treatment Diagnosis:  M62.81  GENERAL MUSCLE WEAKNESS    Referral Source:  Criss Bautista MD Insurance:   Payor: MEDICARE / Plan: MEDICARE PART A AND B / Product Type: *No Product type* /                     Patient  verified yes     Visit #   Current  / Total 8 24   Time   In / Out 1:45p 2:25p   Total Treatment Time 40   Total Timed Codes 40   1:1 Treatment Time 40      [facility] Totals Reminder:  bill using total billable   min of TIMED therapeutic procedures and modalities.   8-22 min = 1 unit; 23-37 min = 2 units; 38-52 min = 3 units; 53-67 min = 4 units; 68-82 min = 5 units            SUBJECTIVE    Pain Level (0-10 scale): 0    Any medication changes, allergies to medications, adverse drug reactions, diagnosis change, or new procedure performed?: [x] No    [] Yes (see summary sheet for update)  Medications: Verified on Patient Summary List    Subjective functional status/changes:     Patient is feeling slightly better than     OBJECTIVE     Therapeutic Procedures:  Tx Min Billable or 1:1 Min (if diff from Tx Min) Procedure, Rationale, Specifics   25   21044 Therapeutic Exercise (timed):  increase ROM, strength, coordination, balance, and proprioception to improve patient's ability to progress to PLOF and address remaining functional goals. (see flow sheet as applicable)     Details if applicable:     15   94693 Neuromuscular Re-Education (timed):  improve balance, coordination, kinesthetic sense, posture, core stability and proprioception to improve patient's ability to develop conscious control of individual muscles and awareness of position of extremities in order to progress to PLOF and address remaining functional goals. (see flow sheet as applicable)     Details if applicable:     40       Total Total           [x]  Patient

## 2025-04-18 DIAGNOSIS — I10 ESSENTIAL (PRIMARY) HYPERTENSION: ICD-10-CM

## 2025-04-18 RX ORDER — AMLODIPINE BESYLATE 5 MG/1
5 TABLET ORAL EVERY MORNING
Qty: 90 TABLET | Refills: 1 | Status: SHIPPED | OUTPATIENT
Start: 2025-04-18

## 2025-04-22 ENCOUNTER — APPOINTMENT (OUTPATIENT)
Facility: HOSPITAL | Age: 85
End: 2025-04-22
Payer: MEDICARE

## 2025-04-23 ENCOUNTER — HOSPITAL ENCOUNTER (OUTPATIENT)
Facility: HOSPITAL | Age: 85
Setting detail: RECURRING SERIES
Discharge: HOME | End: 2025-04-26
Payer: MEDICARE

## 2025-04-23 PROCEDURE — 97112 NEUROMUSCULAR REEDUCATION: CPT | Performed by: PHYSICAL THERAPIST

## 2025-04-23 PROCEDURE — 97110 THERAPEUTIC EXERCISES: CPT | Performed by: PHYSICAL THERAPIST

## 2025-04-23 NOTE — PROGRESS NOTES
applicable:     40       Total Total           [x]  Patient Education billed concurrently with other procedures   [x] Review HEP    [] Progressed/Changed HEP, detail:    [] Other detail:         Other Objective/Functional Measures    Patient required 4 rest breaks due to fatigue and SOB. SpO2 ranged from 95-98%.      Pain Level at end of session (0-10 scale): 0      Assessment   No significant progress made with functional capacity today, however she was able to complete all there ex.  Patient will continue to benefit from skilled PT / OT services to modify and progress therapeutic interventions, analyze and address functional mobility deficits, analyze and address ROM deficits, analyze and address strength deficits, analyze and address soft tissue restrictions, analyze and cue for proper movement patterns, analyze and modify for postural abnormalities, analyze and address imbalance/dizziness, and instruct in home and community integration to address functional deficits and attain remaining goals.    Progress toward goals / Updated goals:  []  See Progress Note/Recertification  Will f/u in 2-3 weeks after the patient sees her pulmonologist.    PLAN  Yes  Continue plan of care  Re-Cert Due: 90 days  [x]  Upgrade activities as tolerated  []  Discharge due to:  []  Other:      Ryan Marshall, PT       4/23/2025       12:07 PM

## 2025-06-03 ENCOUNTER — OFFICE VISIT (OUTPATIENT)
Facility: CLINIC | Age: 85
End: 2025-06-03
Payer: MEDICARE

## 2025-06-03 VITALS
BODY MASS INDEX: 18.99 KG/M2 | TEMPERATURE: 97.9 F | OXYGEN SATURATION: 96 % | HEIGHT: 65 IN | HEART RATE: 76 BPM | DIASTOLIC BLOOD PRESSURE: 70 MMHG | SYSTOLIC BLOOD PRESSURE: 110 MMHG | WEIGHT: 114 LBS | RESPIRATION RATE: 16 BRPM

## 2025-06-03 DIAGNOSIS — I10 HTN, GOAL BELOW 130/80: Primary | ICD-10-CM

## 2025-06-03 DIAGNOSIS — A31.0 PULMONARY MAI (MYCOBACTERIUM AVIUM-INTRACELLULARE) INFECTION (HCC): ICD-10-CM

## 2025-06-03 DIAGNOSIS — C80.1 MALIGNANT (PRIMARY) NEOPLASM, UNSPECIFIED (HCC): ICD-10-CM

## 2025-06-03 DIAGNOSIS — I48.0 PAROXYSMAL ATRIAL FIBRILLATION (HCC): ICD-10-CM

## 2025-06-03 DIAGNOSIS — I50.20 SYSTOLIC HEART FAILURE, UNSPECIFIED HF CHRONICITY (HCC): ICD-10-CM

## 2025-06-03 DIAGNOSIS — Z99.81 OXYGEN DEPENDENT: ICD-10-CM

## 2025-06-03 PROBLEM — F33.1 MAJOR DEPRESSIVE DISORDER, RECURRENT, MODERATE (HCC): Status: RESOLVED | Noted: 2023-01-17 | Resolved: 2025-06-03

## 2025-06-03 PROBLEM — N28.9 KIDNEY LESION: Status: RESOLVED | Noted: 2018-02-08 | Resolved: 2025-06-03

## 2025-06-03 PROCEDURE — 1159F MED LIST DOCD IN RCRD: CPT | Performed by: INTERNAL MEDICINE

## 2025-06-03 PROCEDURE — 1090F PRES/ABSN URINE INCON ASSESS: CPT | Performed by: INTERNAL MEDICINE

## 2025-06-03 PROCEDURE — G8399 PT W/DXA RESULTS DOCUMENT: HCPCS | Performed by: INTERNAL MEDICINE

## 2025-06-03 PROCEDURE — 1160F RVW MEDS BY RX/DR IN RCRD: CPT | Performed by: INTERNAL MEDICINE

## 2025-06-03 PROCEDURE — G8427 DOCREV CUR MEDS BY ELIG CLIN: HCPCS | Performed by: INTERNAL MEDICINE

## 2025-06-03 PROCEDURE — G8420 CALC BMI NORM PARAMETERS: HCPCS | Performed by: INTERNAL MEDICINE

## 2025-06-03 PROCEDURE — 3078F DIAST BP <80 MM HG: CPT | Performed by: INTERNAL MEDICINE

## 2025-06-03 PROCEDURE — 99214 OFFICE O/P EST MOD 30 MIN: CPT | Performed by: INTERNAL MEDICINE

## 2025-06-03 PROCEDURE — 1123F ACP DISCUSS/DSCN MKR DOCD: CPT | Performed by: INTERNAL MEDICINE

## 2025-06-03 PROCEDURE — 3074F SYST BP LT 130 MM HG: CPT | Performed by: INTERNAL MEDICINE

## 2025-06-03 PROCEDURE — 1126F AMNT PAIN NOTED NONE PRSNT: CPT | Performed by: INTERNAL MEDICINE

## 2025-06-03 PROCEDURE — 1036F TOBACCO NON-USER: CPT | Performed by: INTERNAL MEDICINE

## 2025-06-03 RX ORDER — FLUTICASONE PROPIONATE AND SALMETEROL 250; 50 UG/1; UG/1
1 POWDER RESPIRATORY (INHALATION)
COMMUNITY
Start: 2025-05-09 | End: 2026-05-09

## 2025-06-03 SDOH — ECONOMIC STABILITY: FOOD INSECURITY: WITHIN THE PAST 12 MONTHS, THE FOOD YOU BOUGHT JUST DIDN'T LAST AND YOU DIDN'T HAVE MONEY TO GET MORE.: NEVER TRUE

## 2025-06-03 SDOH — ECONOMIC STABILITY: FOOD INSECURITY: WITHIN THE PAST 12 MONTHS, YOU WORRIED THAT YOUR FOOD WOULD RUN OUT BEFORE YOU GOT MONEY TO BUY MORE.: NEVER TRUE

## 2025-06-03 ASSESSMENT — PATIENT HEALTH QUESTIONNAIRE - PHQ9
3. TROUBLE FALLING OR STAYING ASLEEP: NOT AT ALL
SUM OF ALL RESPONSES TO PHQ QUESTIONS 1-9: 0
SUM OF ALL RESPONSES TO PHQ QUESTIONS 1-9: 0
8. MOVING OR SPEAKING SO SLOWLY THAT OTHER PEOPLE COULD HAVE NOTICED. OR THE OPPOSITE, BEING SO FIGETY OR RESTLESS THAT YOU HAVE BEEN MOVING AROUND A LOT MORE THAN USUAL: NOT AT ALL
1. LITTLE INTEREST OR PLEASURE IN DOING THINGS: NOT AT ALL
5. POOR APPETITE OR OVEREATING: NOT AT ALL
SUM OF ALL RESPONSES TO PHQ QUESTIONS 1-9: 0
9. THOUGHTS THAT YOU WOULD BE BETTER OFF DEAD, OR OF HURTING YOURSELF: NOT AT ALL
7. TROUBLE CONCENTRATING ON THINGS, SUCH AS READING THE NEWSPAPER OR WATCHING TELEVISION: NOT AT ALL
SUM OF ALL RESPONSES TO PHQ QUESTIONS 1-9: 0
6. FEELING BAD ABOUT YOURSELF - OR THAT YOU ARE A FAILURE OR HAVE LET YOURSELF OR YOUR FAMILY DOWN: NOT AT ALL
4. FEELING TIRED OR HAVING LITTLE ENERGY: NOT AT ALL
2. FEELING DOWN, DEPRESSED OR HOPELESS: NOT AT ALL

## 2025-06-03 NOTE — ASSESSMENT & PLAN NOTE
No reports of recent bronchitis or antibiotics.  Has chronic cough productive of white phlegm currently

## 2025-06-03 NOTE — ASSESSMENT & PLAN NOTE
Newly started on oxygen 4 L daily.  Comes in not wearing the oxygen.  Has follow-up with Dr. Mcconnell

## 2025-06-03 NOTE — PROGRESS NOTES
Eloise Phillips (:  1940) is a 84 y.o. female,Established patient, here for evaluation of the following chief complaint(s):  6 Month Follow-Up         Assessment & Plan  HTN, goal below 130/80  Controlled on amlodipine 5 mg and valsartan 320 mg.  Recent chemistries reviewed including potassium of 5.1.  No repeat chemistries today         Systolic heart failure, unspecified HF chronicity ([facility])  Stable         Paroxysmal atrial fibrillation ([facility])  Recently saw Dr. Metzger.  Remains on Eliquis and amiodarone.  Notes occasional nosebleeds no other bleeding         Malignant (primary) neoplasm, unspecified ([facility])  Follows at [facility]         Oxygen dependent  Newly started on oxygen 4 L daily.  Comes in not wearing the oxygen.  Has follow-up with Dr. 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  Has chronic cough productive of white phlegm currently           No follow-ups on file.       Subjective   HPI  Seen at follow-up.  She has seen cardiology and pulmonary since our last visit.  She notes that this spring pulmonary did start her on oxygen.  She is supposed to be wearing it .  She is not wearing it today.  Does wear it at home and at night.  Does still have a cough mostly productive of white phlegm.  Has had a few nosebleeds since starting on the oxygen.  Denies any other bleeding.  Does remain on Eliquis.  Notes she is bored since retiring.  Does not seem terribly depressed.  Has not had abdominal symptoms or chest pain.  Has decided against a Watchman procedure.  Has follow-up with her [facility] oncologist this summer  Review of Systems       Objective   Physical Exam  Constitutional:       Appearance: Normal appearance.   HENT:      Head: Normocephalic and atraumatic.   Cardiovascular:      Rate and Rhythm: Normal rate and regular rhythm.   Pulmonary:      Effort: Pulmonary effort is normal.      Breath sounds: Normal breath

## 2025-06-03 NOTE — ASSESSMENT & PLAN NOTE
Recently saw Dr. Metzger.  Remains on Eliquis and amiodarone.  Notes occasional nosebleeds no other bleeding

## 2025-07-10 DIAGNOSIS — I10 ESSENTIAL (PRIMARY) HYPERTENSION: ICD-10-CM

## 2025-07-10 RX ORDER — VALSARTAN 320 MG/1
320 TABLET ORAL DAILY
Qty: 90 TABLET | Refills: 1 | Status: SHIPPED | OUTPATIENT
Start: 2025-07-10

## 2025-08-26 ENCOUNTER — OFFICE VISIT (OUTPATIENT)
Facility: CLINIC | Age: 85
End: 2025-08-26
Payer: MEDICARE

## 2025-08-26 VITALS
WEIGHT: 114 LBS | TEMPERATURE: 97.9 F | SYSTOLIC BLOOD PRESSURE: 132 MMHG | HEIGHT: 65 IN | HEART RATE: 70 BPM | DIASTOLIC BLOOD PRESSURE: 70 MMHG | BODY MASS INDEX: 18.99 KG/M2 | RESPIRATION RATE: 16 BRPM | OXYGEN SATURATION: 98 %

## 2025-08-26 DIAGNOSIS — R60.0 BILATERAL EDEMA OF LOWER EXTREMITY: Primary | ICD-10-CM

## 2025-08-26 DIAGNOSIS — A31.9 ATYPICAL MYCOBACTERIAL DISEASE: ICD-10-CM

## 2025-08-26 DIAGNOSIS — Z99.81 OXYGEN DEPENDENT: ICD-10-CM

## 2025-08-26 DIAGNOSIS — I10 HTN, GOAL BELOW 130/80: ICD-10-CM

## 2025-08-26 DIAGNOSIS — I50.20 SYSTOLIC HEART FAILURE, UNSPECIFIED HF CHRONICITY (HCC): ICD-10-CM

## 2025-08-26 DIAGNOSIS — I48.0 PAROXYSMAL ATRIAL FIBRILLATION (HCC): ICD-10-CM

## 2025-08-26 DIAGNOSIS — R60.0 BILATERAL EDEMA OF LOWER EXTREMITY: ICD-10-CM

## 2025-08-26 DIAGNOSIS — J47.9 BRONCHIECTASIS WITHOUT COMPLICATION (HCC): ICD-10-CM

## 2025-08-26 LAB
ANION GAP SERPL CALC-SCNC: 12 MMOL/L (ref 2–14)
BUN SERPL-MCNC: 28 MG/DL (ref 8–23)
BUN/CREAT SERPL: 26 (ref 12–20)
CALCIUM SERPL-MCNC: 9.2 MG/DL (ref 8.8–10.2)
CHLORIDE SERPL-SCNC: 103 MMOL/L (ref 98–107)
CO2 SERPL-SCNC: 24 MMOL/L (ref 20–29)
CREAT SERPL-MCNC: 1.07 MG/DL (ref 0.6–1)
GLUCOSE SERPL-MCNC: 87 MG/DL (ref 65–100)
NT PRO BNP: 1990 PG/ML (ref 0–450)
POTASSIUM SERPL-SCNC: 4.9 MMOL/L (ref 3.5–5.1)
SODIUM SERPL-SCNC: 139 MMOL/L (ref 136–145)

## 2025-08-26 PROCEDURE — 1159F MED LIST DOCD IN RCRD: CPT | Performed by: INTERNAL MEDICINE

## 2025-08-26 PROCEDURE — 1123F ACP DISCUSS/DSCN MKR DOCD: CPT | Performed by: INTERNAL MEDICINE

## 2025-08-26 PROCEDURE — 1036F TOBACCO NON-USER: CPT | Performed by: INTERNAL MEDICINE

## 2025-08-26 PROCEDURE — 3078F DIAST BP <80 MM HG: CPT | Performed by: INTERNAL MEDICINE

## 2025-08-26 PROCEDURE — 99214 OFFICE O/P EST MOD 30 MIN: CPT | Performed by: INTERNAL MEDICINE

## 2025-08-26 PROCEDURE — 1126F AMNT PAIN NOTED NONE PRSNT: CPT | Performed by: INTERNAL MEDICINE

## 2025-08-26 PROCEDURE — G8420 CALC BMI NORM PARAMETERS: HCPCS | Performed by: INTERNAL MEDICINE

## 2025-08-26 PROCEDURE — 3075F SYST BP GE 130 - 139MM HG: CPT | Performed by: INTERNAL MEDICINE

## 2025-08-26 PROCEDURE — 1160F RVW MEDS BY RX/DR IN RCRD: CPT | Performed by: INTERNAL MEDICINE

## 2025-08-26 PROCEDURE — 1090F PRES/ABSN URINE INCON ASSESS: CPT | Performed by: INTERNAL MEDICINE

## 2025-08-26 PROCEDURE — G8399 PT W/DXA RESULTS DOCUMENT: HCPCS | Performed by: INTERNAL MEDICINE

## 2025-08-26 PROCEDURE — G8427 DOCREV CUR MEDS BY ELIG CLIN: HCPCS | Performed by: INTERNAL MEDICINE

## 2025-08-26 RX ORDER — CETIRIZINE HYDROCHLORIDE 5 MG/1
5 TABLET ORAL DAILY
COMMUNITY

## 2025-08-26 RX ORDER — FUROSEMIDE 20 MG/1
20 TABLET ORAL DAILY PRN
Qty: 30 TABLET | Refills: 0 | Status: SHIPPED | OUTPATIENT
Start: 2025-08-26

## (undated) DEVICE — Device: Brand: PADPRO